# Patient Record
Sex: MALE | Race: WHITE | NOT HISPANIC OR LATINO | Employment: UNEMPLOYED | URBAN - METROPOLITAN AREA
[De-identification: names, ages, dates, MRNs, and addresses within clinical notes are randomized per-mention and may not be internally consistent; named-entity substitution may affect disease eponyms.]

---

## 2019-04-23 ENCOUNTER — OFFICE VISIT (OUTPATIENT)
Dept: URGENT CARE | Facility: CLINIC | Age: 49
End: 2019-04-23

## 2019-04-23 VITALS
SYSTOLIC BLOOD PRESSURE: 140 MMHG | RESPIRATION RATE: 16 BRPM | HEIGHT: 72 IN | WEIGHT: 180 LBS | HEART RATE: 90 BPM | DIASTOLIC BLOOD PRESSURE: 113 MMHG | OXYGEN SATURATION: 97 % | BODY MASS INDEX: 24.38 KG/M2 | TEMPERATURE: 99 F

## 2019-04-23 DIAGNOSIS — H10.31 ACUTE BACTERIAL CONJUNCTIVITIS OF RIGHT EYE: ICD-10-CM

## 2019-04-23 DIAGNOSIS — T15.91XA FOREIGN BODY OF RIGHT EYE, INITIAL ENCOUNTER: Primary | ICD-10-CM

## 2019-04-23 DIAGNOSIS — F17.200 CURRENT EVERY DAY SMOKER: ICD-10-CM

## 2019-04-23 PROCEDURE — 99203 PR OFFICE/OUTPT VISIT, NEW, LEVL III, 30-44 MIN: ICD-10-PCS | Mod: S$GLB,,, | Performed by: NURSE PRACTITIONER

## 2019-04-23 PROCEDURE — 99203 OFFICE O/P NEW LOW 30 MIN: CPT | Mod: S$GLB,,, | Performed by: NURSE PRACTITIONER

## 2019-04-23 RX ORDER — GENTAMICIN SULFATE 1 MG/G
OINTMENT TOPICAL
Qty: 15 G | Refills: 0 | Status: SHIPPED | OUTPATIENT
Start: 2019-04-23 | End: 2020-07-31

## 2019-04-23 NOTE — PATIENT INSTRUCTIONS
Return to Urgent Care or go to ER if symptoms worsen or fail to improve.  Follow up with PCP as recommended for further management.       Conjunctivitis, Bacterial    You have an infection in the membranes covering the white part of the eye. This part of the eye is called the conjunctiva. The infection is called conjunctivitis. The most common symptoms of conjunctivitis include a thick, pus-like discharge from the eye, swollen eyelids, redness, eyelids sticking together upon awakening, and a gritty or scratchy feeling in the eye. Your infection was caused by bacteria. It may be treated with medicine. With treatment, the infection takes about 7 to 10 days to resolve.  Home care  · Use prescribed antibiotic eye drops or ointment as directed to treat the infection.  · Apply a warm compress (towel soaked in warm water) to the affected eye 3 to 4 times a day. Do this just before applying medicine to the eye.  · Use a warm, wet cloth to wipe away crusting of the eyelids in the morning. This is caused by mucus drainage during the night. You may also use saline irrigating solution or artificial tears to rinse away mucus in the eye. Do not put a patch over the eye.  · Wash your hands before and after touching the infected eye. This is to prevent spreading the infection to the other eye, and to other people. Do not share your towels or washcloths with others.  · You may use acetaminophen or ibuprofen to control pain, unless another medicine was prescribed. (Note: If you have chronic liver or kidney disease or have ever had a stomach ulcer or gastrointestinal bleeding, talk with your doctor before using these medicines.)  · Do not wear contact lenses until your eyes have healed and all symptoms are gone.  Follow-up care  Follow up with your healthcare provider, or as advised.  When to seek medical advice  Call your healthcare provider right away if any of these occur:  · Worsening vision  · Increasing pain in the  eye  · Increasing swelling or redness of the eyelid  · Redness spreading around the eye  Date Last Reviewed: 6/14/2015  © 4355-1402 [employer]. [address] [place] [address], [place] [address]. All rights reserved. This information is not intended as a substitute for professional medical care. Always follow your healthcare professional's instructions.

## 2019-04-23 NOTE — PROGRESS NOTES
Subjective:       Patient ID: Tee Nicole is a 48 y.o. male.    Vitals:  height is 6' (1.829 m) and weight is 81.6 kg (180 lb). His tympanic temperature is 98.8 °F (37.1 °C). His blood pressure is 140/113 (abnormal) and his pulse is 90. His respiration is 16 and oxygen saturation is 97%.     Chief Complaint: Eye Problem    Patient states that he was cutting fence boards and felt something go in his right eye 3 days ago.  He flushed his eye at the time, but now he feels it is infected-- with drainage and eyelid swelling and discharge.   He also has blurry vision.     Eye Problem    The right eye is affected. This is a new problem. Episode onset: 2 days. The problem occurs constantly. The problem has been gradually worsening. The injury mechanism was a direct trauma. The pain is at a severity of 1/10. The pain is mild. There is no known exposure to pink eye. He does not wear contacts. Associated symptoms include blurred vision, an eye discharge, eye redness and photophobia. Pertinent negatives include no double vision, fever, itching, nausea or vomiting. He has tried water for the symptoms. The treatment provided no relief.       Constitution: Negative for chills and fever.   HENT: Negative for congestion and sinus pain.    Eyes: Positive for eye trauma, eye discharge, eye pain, eye redness, photophobia, blurred vision and eyelid swelling. Negative for foreign body in eye, eye itching, vision loss and double vision.   Gastrointestinal: Negative for nausea and vomiting.   Skin: Negative for rash.   Allergic/Immunologic: Negative for seasonal allergies and itching.   Neurological: Negative for headaches.       Objective:      Physical Exam   Constitutional: He is oriented to person, place, and time. Vital signs are normal. He appears well-developed and well-nourished.   HENT:   Head: Normocephalic and atraumatic.   Right Ear: External ear normal.   Left Ear: External ear normal.   Nose: Nose normal.    Mouth/Throat: Oropharynx is clear and moist.   Eyes: Pupils are equal, round, and reactive to light. EOM and lids are normal. Lids are everted and swept, no foreign bodies found. Right eye exhibits discharge and exudate. No foreign body present in the right eye. Left eye exhibits no discharge and no exudate. No foreign body present in the left eye. Right conjunctiva is injected. Left conjunctiva is not injected.   Slit lamp exam:       The left eye shows no corneal ulcer, no foreign body and no fluorescein uptake.   Neck: Trachea normal, full passive range of motion without pain and phonation normal. Neck supple.   Musculoskeletal: Normal range of motion.   Neurological: He is alert and oriented to person, place, and time.   Skin: Skin is warm, dry and intact.   Psychiatric: He has a normal mood and affect. His speech is normal and behavior is normal. Judgment and thought content normal. Cognition and memory are normal.   Nursing note and vitals reviewed.      Assessment:       1. Foreign body of right eye, initial encounter    2. Acute bacterial conjunctivitis of right eye    3. Current every day smoker        Plan:       Right eye flushed with copious amounts of saline. 1mm soft material noted in flushed fluid.     Foreign body of right eye, initial encounter    Acute bacterial conjunctivitis of right eye    Current every day smoker    Other orders  -     gentamicin (GARAMYCIN) 0.1 % ointment; Apply 1/2 inch ribbon in the right eye TID x 10 days  Dispense: 15 g; Refill: 0

## 2019-04-26 ENCOUNTER — OFFICE VISIT (OUTPATIENT)
Dept: URGENT CARE | Facility: CLINIC | Age: 49
End: 2019-04-26

## 2019-04-26 VITALS
OXYGEN SATURATION: 97 % | RESPIRATION RATE: 18 BRPM | WEIGHT: 180 LBS | SYSTOLIC BLOOD PRESSURE: 145 MMHG | DIASTOLIC BLOOD PRESSURE: 101 MMHG | BODY MASS INDEX: 24.38 KG/M2 | HEART RATE: 94 BPM | TEMPERATURE: 98 F | HEIGHT: 72 IN

## 2019-04-26 DIAGNOSIS — H00.033 CELLULITIS OF RIGHT EYELID: ICD-10-CM

## 2019-04-26 DIAGNOSIS — H10.9 BACTERIAL CONJUNCTIVITIS: Primary | ICD-10-CM

## 2019-04-26 PROCEDURE — 99214 PR OFFICE/OUTPT VISIT, EST, LEVL IV, 30-39 MIN: ICD-10-PCS | Mod: S$GLB,,, | Performed by: PHYSICIAN ASSISTANT

## 2019-04-26 PROCEDURE — 99214 OFFICE O/P EST MOD 30 MIN: CPT | Mod: S$GLB,,, | Performed by: PHYSICIAN ASSISTANT

## 2019-04-26 RX ORDER — POLYMYXIN B SULFATE AND TRIMETHOPRIM 1; 10000 MG/ML; [USP'U]/ML
1 SOLUTION OPHTHALMIC EVERY 6 HOURS
Qty: 10 ML | Refills: 0 | Status: SHIPPED | OUTPATIENT
Start: 2019-04-26 | End: 2019-05-03

## 2019-04-26 RX ORDER — SULFAMETHOXAZOLE AND TRIMETHOPRIM 800; 160 MG/1; MG/1
1 TABLET ORAL 2 TIMES DAILY
Qty: 14 TABLET | Refills: 0 | Status: SHIPPED | OUTPATIENT
Start: 2019-04-26 | End: 2019-05-03

## 2019-04-26 NOTE — PATIENT INSTRUCTIONS
-Please take antibiotic to completion.    Apply antibiotic drops to completion. Apply warm compresses to eye 3-4 times daily for 10-15 minutes.    Please follow up with your primary care provider within 2-5 days if your signs and symptoms have not resolved or worsen.     If your condition worsens or fails to improve we recommend that you receive another evaluation at the emergency room immediately or contact your primary medical clinic to discuss your concerns.   You must understand that you have received an Urgent Care treatment only and that you may be released before all of your medical problems are known or treated. You, the patient, will arrange for follow up care as instructed.         Conjunctivitis, Bacterial    You have an infection in the membranes covering the white part of the eye. This part of the eye is called the conjunctiva. The infection is called conjunctivitis. The most common symptoms of conjunctivitis include a thick, pus-like discharge from the eye, swollen eyelids, redness, eyelids sticking together upon awakening, and a gritty or scratchy feeling in the eye. Your infection was caused by bacteria. It may be treated with medicine. With treatment, the infection takes about 7 to 10 days to resolve.  Home care  · Use prescribed antibiotic eye drops or ointment as directed to treat the infection.  · Apply a warm compress (towel soaked in warm water) to the affected eye 3 to 4 times a day. Do this just before applying medicine to the eye.  · Use a warm, wet cloth to wipe away crusting of the eyelids in the morning. This is caused by mucus drainage during the night. You may also use saline irrigating solution or artificial tears to rinse away mucus in the eye. Do not put a patch over the eye.  · Wash your hands before and after touching the infected eye. This is to prevent spreading the infection to the other eye, and to other people. Do not share your towels or washcloths with others.  · You may use  acetaminophen or ibuprofen to control pain, unless another medicine was prescribed. (Note: If you have chronic liver or kidney disease or have ever had a stomach ulcer or gastrointestinal bleeding, talk with your doctor before using these medicines.)  · Do not wear contact lenses until your eyes have healed and all symptoms are gone.  Follow-up care  Follow up with your healthcare provider, or as advised.  When to seek medical advice  Call your healthcare provider right away if any of these occur:  · Worsening vision  · Increasing pain in the eye  · Increasing swelling or redness of the eyelid  · Redness spreading around the eye  Date Last Reviewed: 6/14/2015  © 9730-2815 [employer]. [address] [place] [address], [place] [address]. All rights reserved. This information is not intended as a substitute for professional medical care. Always follow your healthcare professional's instructions.

## 2019-04-26 NOTE — PROGRESS NOTES
Subjective:       Patient ID: Tee Nicole is a 48 y.o. male.    Vitals:  height is 6' (1.829 m) and weight is 81.6 kg (180 lb). His temperature is 98.2 °F (36.8 °C). His blood pressure is 145/101 (abnormal) and his pulse is 94. His respiration is 18 and oxygen saturation is 97%.     Chief Complaint: Eye Problem    Eye Problem    The right eye is affected. This is a new problem. The current episode started 1 to 4 weeks ago (1 week). The problem occurs constantly. The problem has been gradually worsening. There was no injury mechanism. The pain is at a severity of 4/10. There is no known exposure to pink eye. He does not wear contacts. Associated symptoms include eye redness. Pertinent negatives include no blurred vision, eye discharge, double vision, fever, itching, nausea, photophobia or vomiting. He has tried nothing for the symptoms.       Constitution: Negative for chills and fever.   HENT: Negative for congestion and sinus pain.    Eyes: Positive for eye pain, eye redness and eyelid swelling. Negative for eye trauma, foreign body in eye, eye discharge, eye itching, photophobia, vision loss, double vision and blurred vision.   Gastrointestinal: Negative for nausea and vomiting.   Skin: Positive for erythema. Negative for rash.   Allergic/Immunologic: Negative for seasonal allergies and itching.   Neurological: Negative for headaches.       Objective:      Physical Exam   Constitutional: He is oriented to person, place, and time. He appears well-developed and well-nourished. He does not appear ill. No distress.   HENT:   Head: Normocephalic and atraumatic.   Right Ear: External ear normal.   Left Ear: External ear normal.   Nose: Nose normal.   Eyes: Pupils are equal, round, and reactive to light. EOM are normal. Lids are everted and swept, no foreign bodies found. Right eye exhibits discharge (purulent). Right eye exhibits no chemosis, no exudate and no hordeolum. No foreign body present in the right eye.  Left eye exhibits no chemosis, no discharge, no exudate and no hordeolum. No foreign body present in the left eye. Right conjunctiva is injected. Right conjunctiva has no hemorrhage. Left conjunctiva is not injected. Left conjunctiva has no hemorrhage. No scleral icterus.       Neck: Normal range of motion. Neck supple.   Cardiovascular: Normal rate, regular rhythm and normal heart sounds. Exam reveals no gallop and no friction rub.   No murmur heard.  Pulmonary/Chest: Effort normal and breath sounds normal. No stridor. No respiratory distress. He has no decreased breath sounds. He has no wheezes. He has no rhonchi. He has no rales.   Musculoskeletal: Normal range of motion.   Neurological: He is alert and oriented to person, place, and time.   Skin: Skin is warm and dry. No rash noted. He is not diaphoretic. There is erythema. No pallor.   Psychiatric: He has a normal mood and affect. His behavior is normal.   Nursing note and vitals reviewed.      Assessment:       1. Bacterial conjunctivitis    2. Cellulitis of right eyelid        Plan:         Bacterial conjunctivitis  -     polymyxin B sulf-trimethoprim (POLYTRIM) 10,000 unit- 1 mg/mL Drop; Place 1 drop into both eyes every 6 (six) hours. for 7 days  Dispense: 10 mL; Refill: 0    Cellulitis of right eyelid  -     sulfamethoxazole-trimethoprim 800-160mg (BACTRIM DS) 800-160 mg Tab; Take 1 tablet by mouth 2 (two) times daily. for 7 days  Dispense: 14 tablet; Refill: 0      Patient Instructions   -Please take antibiotic to completion.    Apply antibiotic drops to completion. Apply warm compresses to eye 3-4 times daily for 10-15 minutes.    Please follow up with your primary care provider within 2-5 days if your signs and symptoms have not resolved or worsen.     If your condition worsens or fails to improve we recommend that you receive another evaluation at the emergency room immediately or contact your primary medical clinic to discuss your concerns.   You must  understand that you have received an Urgent Care treatment only and that you may be released before all of your medical problems are known or treated. You, the patient, will arrange for follow up care as instructed.         Conjunctivitis, Bacterial    You have an infection in the membranes covering the white part of the eye. This part of the eye is called the conjunctiva. The infection is called conjunctivitis. The most common symptoms of conjunctivitis include a thick, pus-like discharge from the eye, swollen eyelids, redness, eyelids sticking together upon awakening, and a gritty or scratchy feeling in the eye. Your infection was caused by bacteria. It may be treated with medicine. With treatment, the infection takes about 7 to 10 days to resolve.  Home care  · Use prescribed antibiotic eye drops or ointment as directed to treat the infection.  · Apply a warm compress (towel soaked in warm water) to the affected eye 3 to 4 times a day. Do this just before applying medicine to the eye.  · Use a warm, wet cloth to wipe away crusting of the eyelids in the morning. This is caused by mucus drainage during the night. You may also use saline irrigating solution or artificial tears to rinse away mucus in the eye. Do not put a patch over the eye.  · Wash your hands before and after touching the infected eye. This is to prevent spreading the infection to the other eye, and to other people. Do not share your towels or washcloths with others.  · You may use acetaminophen or ibuprofen to control pain, unless another medicine was prescribed. (Note: If you have chronic liver or kidney disease or have ever had a stomach ulcer or gastrointestinal bleeding, talk with your doctor before using these medicines.)  · Do not wear contact lenses until your eyes have healed and all symptoms are gone.  Follow-up care  Follow up with your healthcare provider, or as advised.  When to seek medical advice  Call your healthcare provider right  away if any of these occur:  · Worsening vision  · Increasing pain in the eye  · Increasing swelling or redness of the eyelid  · Redness spreading around the eye  Date Last Reviewed: 6/14/2015  © 4742-7024 [employer]. [address] [place] [address], [place] [address]. All rights reserved. This information is not intended as a substitute for professional medical care. Always follow your healthcare professional's instructions.

## 2020-05-26 ENCOUNTER — OFFICE VISIT (OUTPATIENT)
Dept: URGENT CARE | Facility: CLINIC | Age: 50
End: 2020-05-26
Payer: MEDICAID

## 2020-05-26 VITALS
HEIGHT: 72 IN | RESPIRATION RATE: 18 BRPM | WEIGHT: 190 LBS | OXYGEN SATURATION: 96 % | TEMPERATURE: 98 F | HEART RATE: 119 BPM | BODY MASS INDEX: 25.73 KG/M2

## 2020-05-26 DIAGNOSIS — M25.521 RIGHT ELBOW PAIN: Primary | ICD-10-CM

## 2020-05-26 DIAGNOSIS — Z11.59 ENCOUNTER FOR SCREENING FOR OTHER VIRAL DISEASES: ICD-10-CM

## 2020-05-26 DIAGNOSIS — M77.9 TENDONITIS: ICD-10-CM

## 2020-05-26 LAB — SARS-COV-2 RNA RESP QL NAA+PROBE: NOT DETECTED

## 2020-05-26 PROCEDURE — 73080 XR ELBOW COMPLETE 3 VIEW RIGHT: ICD-10-PCS | Mod: FY,RT,S$GLB, | Performed by: RADIOLOGY

## 2020-05-26 PROCEDURE — 99214 PR OFFICE/OUTPT VISIT, EST, LEVL IV, 30-39 MIN: ICD-10-PCS | Mod: S$GLB,,, | Performed by: NURSE PRACTITIONER

## 2020-05-26 PROCEDURE — 73080 X-RAY EXAM OF ELBOW: CPT | Mod: FY,RT,S$GLB, | Performed by: RADIOLOGY

## 2020-05-26 PROCEDURE — 99214 OFFICE O/P EST MOD 30 MIN: CPT | Mod: S$GLB,,, | Performed by: NURSE PRACTITIONER

## 2020-05-26 PROCEDURE — U0003 INFECTIOUS AGENT DETECTION BY NUCLEIC ACID (DNA OR RNA); SEVERE ACUTE RESPIRATORY SYNDROME CORONAVIRUS 2 (SARS-COV-2) (CORONAVIRUS DISEASE [COVID-19]), AMPLIFIED PROBE TECHNIQUE, MAKING USE OF HIGH THROUGHPUT TECHNOLOGIES AS DESCRIBED BY CMS-2020-01-R: HCPCS

## 2020-05-26 RX ORDER — NAPROXEN 500 MG/1
500 TABLET ORAL 2 TIMES DAILY WITH MEALS
Qty: 20 TABLET | Refills: 0 | Status: SHIPPED | OUTPATIENT
Start: 2020-05-26 | End: 2020-06-05

## 2020-05-26 NOTE — LETTER
May 26, 2020      [facility]  [place] [id number]  Phone: [phone]  Fax: [phone]       Patient: Tee Nicole   YOB: 1970  Date of Visit: 05/26/2020    To Whom It May Concern:    Ana Nicole  was at [facility] on 05/26/2020. He may return to work/school on 5/27/2020 with no restrictions. If you have any questions or concerns, or if I can be of further assistance, please do not hesitate to contact me.    Sincerely,      Cuca Rivera, NP

## 2020-05-26 NOTE — PROGRESS NOTES
Subjective:       Patient ID: Tee Nicole is a 49 y.o. male.    Vitals:  height is 6' (1.829 m) and weight is 86.2 kg (190 lb). His oral temperature is 97.6 °F (36.4 °C). His pulse is 119 (abnormal). His respiration is 18 and oxygen saturation is 96%.     Chief Complaint: Elbow Pain    Patient states bumped it or hit it.  Patient called his work and stated his work is requiring COVID testing.  Needs work excuse after COVID test returns.     Elbow Pain   This is a new problem. The current episode started today. The problem occurs constantly. The problem has been gradually worsening. Pertinent negatives include no arthralgias, chest pain, chills, congestion, coughing, fatigue, fever, headaches, joint swelling, myalgias, nausea, rash, sore throat, vertigo or vomiting. Associated symptoms comments: Passed kidney stone this morning after that now has pain in right elbow. The symptoms are aggravated by twisting and walking. He has tried position changes for the symptoms. The treatment provided no relief.       Constitution: Negative for chills, fatigue and fever.   HENT: Negative for congestion and sore throat.    Neck: Negative for painful lymph nodes.   Cardiovascular: Negative for chest pain and leg swelling.   Eyes: Negative for double vision and blurred vision.   Respiratory: Negative for cough and shortness of breath.    Gastrointestinal: Negative for nausea, vomiting and diarrhea.   Genitourinary: Negative for dysuria, frequency and urgency.   Musculoskeletal: Positive for pain. Negative for joint pain, joint swelling, muscle cramps and muscle ache.   Skin: Negative for color change, pale and rash.   Allergic/Immunologic: Negative for seasonal allergies.   Neurological: Negative for dizziness, history of vertigo, light-headedness, passing out and headaches.   Hematologic/Lymphatic: Negative for swollen lymph nodes, easy bruising/bleeding and history of blood clots. Does not bruise/bleed easily.    Psychiatric/Behavioral: Negative for nervous/anxious, sleep disturbance and depression. The patient is not nervous/anxious.        Objective:      Physical Exam   Constitutional: He is oriented to person, place, and time. Vital signs are normal. He appears well-developed and well-nourished. He is active and cooperative. No distress.   HENT:   Head: Normocephalic and atraumatic.   Nose: Nose normal.   Eyes: Conjunctivae and lids are normal.   Cardiovascular: Normal rate, regular rhythm, normal heart sounds, intact distal pulses and normal pulses.   Pulmonary/Chest: Effort normal and breath sounds normal.   Musculoskeletal: He exhibits no edema or deformity.        Right elbow: He exhibits decreased range of motion. He exhibits no swelling, no effusion, no deformity and no laceration. Tenderness (TTP, see diagram) found.        Arms:  Neurological: He is alert and oriented to person, place, and time. He has normal strength and normal reflexes. He is not disoriented. No sensory deficit.   Skin: Skin is warm, dry, intact and not diaphoretic.   Psychiatric: He has a normal mood and affect. His speech is normal and behavior is normal. Judgment and thought content normal. Cognition and memory are normal.   Nursing note and vitals reviewed.        Assessment:       1. Right elbow pain    2. Encounter for screening for other viral diseases    3. Tendonitis        Plan:       X-ray Elbow Complete 3 Views Right    Result Date: 5/26/2020  EXAMINATION: XR ELBOW COMPLETE 3 VIEW RIGHT CLINICAL HISTORY: . Pain in right elbow TECHNIQUE: AP, lateral, and radial head views of the right elbow were performed. COMPARISON: Three views: FINDINGS: No fracture or dislocation. No fat pad elevation. Cartilage spaces are maintained. Soft tissues are unremarkable.     No fracture dislocation bone destruction or joint effusion seen. Electronically signed by resident: Uziel Nguyen Date:    05/26/2020 Time:    10:08 Electronically signed by: Pradeep  MD Giovanni Date:    05/26/2020 Time:    10:11    Right elbow pain  -     X-Ray Elbow Complete 3 views Right; Future; Expected date: 05/26/2020  -     naproxen (NAPROSYN) 500 MG tablet; Take 1 tablet (500 mg total) by mouth 2 (two) times daily with meals. As needed for pain. for 10 days  Dispense: 20 tablet; Refill: 0    Encounter for screening for other viral diseases  -     COVID-19 Routine Screening    Tendonitis  -     X-Ray Elbow Complete 3 views Right; Future; Expected date: 05/26/2020  -     naproxen (NAPROSYN) 500 MG tablet; Take 1 tablet (500 mg total) by mouth 2 (two) times daily with meals. As needed for pain. for 10 days  Dispense: 20 tablet; Refill: 0      Patient Instructions     Please follow up with your Primary care provider within 2-5 days if your signs and symptoms have not resolved or worsen.     If your condition worsens or fails to improve we recommend that you receive another evaluation at the emergency room immediately or contact your primary medical clinic to discuss your concerns.    You must understand that you have received an Urgent Care treatment only and that you may be released before all of your medical problems are known or treated.   You, the patient, will arrange for follow up care as instructed.       ORTHO    R.I.C.E. to the affected joint or limb as needed:    Rest- the injured or sore extremity.  Ice- for the next 24-48 hours, alternating 20 minutes on and 20 minutes off as needed up to 3 times a day.   Compression-Use bandages to stabilize injured limb.  Check circulation to make sure  bandage is not too tight.    Elevate-when possible to reduce swelling.      Ice 20 minutes on and 20 minutes off as needed for pain.     Please drink plenty of fluids.    Please get plenty of rest.    Please return here or go to the Emergency Department for any concerns or worsening of condition.    Please be aware that narcotics can be addictive. If I gave you narcotics, I have given you a  limited quantity to take as it is needed at this time. However take it sparingly and only when needed.  Do not operate machinery or drive on this medication.      If you were not prescribed an anti-inflammatory medication, and if you do not have any history of stomach/intestinal ulcers, or kidney disease, or are not taking a blood thinner such as Coumadin, Plavix, Pradaxa, Eloquis, or Xaralta for example, it is OK to take over the counter Ibuprofen or Advil or Motrin or Aleve as directed.  Do not take these medications on an empty stomach.    If you were given a splint wear it at all times unless otherwise instructed.     If you were given crutches use them as we instructed. Do not rest your armpits on the foam pad or you risk compressing the nerves and the vessels there.    Please follow up with your primary care doctor or specialist as needed.    If you lose control of your bowel and/or bladder, please go to the nearest Emergency Department immediately.  If you lose sensation in between your legs by your genitalia and/or rectum, please go to the nearest Emergency Department immediately.  If you lose control or sensation of any extremity, please go to the nearest Emergency Department immediately.      R.I.C.E.    R.I.C.E. stands for Rest, Ice, Compression, and Elevation. Doing these things helps limit pain and swelling after an injury. R.I.C.E. also helps injuries heal faster. Use R.I.C.E. for sprains, strains, and severe bruises or bumps. Follow the tips on this handout and begin R.I.C.E. as soon as possible after an injury.  ? Rest  Pain is your bodys way of telling you to rest an injured area. Whether you have hurt an elbow, hand, foot, or knee, limiting its use will prevent further injury and help you heal.  ? Ice  Applying ice right after an injury helps prevent swelling and reduce pain. Dont place ice directly on your skin.  · Wrap a cold pack or bag of ice in a thin cloth. Place it over the injured  area.  · Ice for 10 minutes every 3 hours. Dont ice for more than 20 minutes at a time.  ? Compression  Putting pressure (compression) on an injury helps prevent swelling and provides support.  · Wrap the injured area firmly with an elastic bandage. If your hand or foot tingles, becomes discolored, or feels cold to the touch, the bandage may be too tight. Rewrap it more loosely.  · If your bandage becomes too loose, rewrap it.  · Do not wear an elastic bandage overnight.  ? Elevation  Keeping an injury elevated helps reduce swelling, pain, and throbbing. Elevation is most effective when the injury is kept elevated higher than the heart.     Call your healthcare provider if you notice any of the following:  · Fingers or toes feel numb, are cold to the touch, or change color  · Skin looks shiny or tight  · Pain, swelling, or bruising worsens and is not improved with elevation   Date Last Reviewed: 9/3/2015  © 8865-4152 [employer]. [address] [place] [address], [place] [address]. All rights reserved. This information is not intended as a substitute for professional medical care. Always follow your healthcare professional's instructions.

## 2020-05-26 NOTE — PATIENT INSTRUCTIONS
Please follow up with your Primary care provider within 2-5 days if your signs and symptoms have not resolved or worsen.     If your condition worsens or fails to improve we recommend that you receive another evaluation at the emergency room immediately or contact your primary medical clinic to discuss your concerns.    You must understand that you have received an Urgent Care treatment only and that you may be released before all of your medical problems are known or treated.   You, the patient, will arrange for follow up care as instructed.       ORTHO    R.I.C.E. to the affected joint or limb as needed:    Rest- the injured or sore extremity.  Ice- for the next 24-48 hours, alternating 20 minutes on and 20 minutes off as needed up to 3 times a day.   Compression-Use bandages to stabilize injured limb.  Check circulation to make sure  bandage is not too tight.    Elevate-when possible to reduce swelling.      Ice 20 minutes on and 20 minutes off as needed for pain.     Please drink plenty of fluids.    Please get plenty of rest.    Please return here or go to the Emergency Department for any concerns or worsening of condition.    Please be aware that narcotics can be addictive. If I gave you narcotics, I have given you a limited quantity to take as it is needed at this time. However take it sparingly and only when needed.  Do not operate machinery or drive on this medication.      If you were not prescribed an anti-inflammatory medication, and if you do not have any history of stomach/intestinal ulcers, or kidney disease, or are not taking a blood thinner such as Coumadin, Plavix, Pradaxa, Eloquis, or Xaralta for example, it is OK to take over the counter Ibuprofen or Advil or Motrin or Aleve as directed.  Do not take these medications on an empty stomach.    If you were given a splint wear it at all times unless otherwise instructed.     If you were given crutches use them as we instructed. Do not rest your  armpits on the foam pad or you risk compressing the nerves and the vessels there.    Please follow up with your primary care doctor or specialist as needed.    If you lose control of your bowel and/or bladder, please go to the nearest Emergency Department immediately.  If you lose sensation in between your legs by your genitalia and/or rectum, please go to the nearest Emergency Department immediately.  If you lose control or sensation of any extremity, please go to the nearest Emergency Department immediately.      R.I.C.E.    R.I.C.E. stands for Rest, Ice, Compression, and Elevation. Doing these things helps limit pain and swelling after an injury. R.I.C.E. also helps injuries heal faster. Use R.I.C.E. for sprains, strains, and severe bruises or bumps. Follow the tips on this handout and begin R.I.C.E. as soon as possible after an injury.  ? Rest  Pain is your bodys way of telling you to rest an injured area. Whether you have hurt an elbow, hand, foot, or knee, limiting its use will prevent further injury and help you heal.  ? Ice  Applying ice right after an injury helps prevent swelling and reduce pain. Dont place ice directly on your skin.  · Wrap a cold pack or bag of ice in a thin cloth. Place it over the injured area.  · Ice for 10 minutes every 3 hours. Dont ice for more than 20 minutes at a time.  ? Compression  Putting pressure (compression) on an injury helps prevent swelling and provides support.  · Wrap the injured area firmly with an elastic bandage. If your hand or foot tingles, becomes discolored, or feels cold to the touch, the bandage may be too tight. Rewrap it more loosely.  · If your bandage becomes too loose, rewrap it.  · Do not wear an elastic bandage overnight.  ? Elevation  Keeping an injury elevated helps reduce swelling, pain, and throbbing. Elevation is most effective when the injury is kept elevated higher than the heart.     Call your healthcare provider if you notice any of the  following:  · Fingers or toes feel numb, are cold to the touch, or change color  · Skin looks shiny or tight  · Pain, swelling, or bruising worsens and is not improved with elevation   Date Last Reviewed: 9/3/2015  © 2677-4625 [employer]. [address] [place] [address]. All rights reserved. This information is not intended as a substitute for professional medical care. Always follow your healthcare professional's instructions.

## 2020-05-28 ENCOUNTER — TELEPHONE (OUTPATIENT)
Dept: URGENT CARE | Facility: CLINIC | Age: 50
End: 2020-05-28

## 2020-05-28 NOTE — TELEPHONE ENCOUNTER
Call back covid results - Spoke with patient and gave him neg results.     ----- Message from Sandrine Abraham PA-C sent at 5/28/2020  9:34 AM CDT -----  Please let patient know of NEGATIVE COVID-19 testing. Thanks.

## 2020-05-29 ENCOUNTER — TELEPHONE (OUTPATIENT)
Dept: URGENT CARE | Facility: CLINIC | Age: 50
End: 2020-05-29

## 2020-06-04 ENCOUNTER — OFFICE VISIT (OUTPATIENT)
Dept: URGENT CARE | Facility: CLINIC | Age: 50
End: 2020-06-04
Payer: MEDICAID

## 2020-06-04 VITALS
WEIGHT: 190 LBS | BODY MASS INDEX: 25.73 KG/M2 | OXYGEN SATURATION: 96 % | HEART RATE: 110 BPM | HEIGHT: 72 IN | TEMPERATURE: 97 F

## 2020-06-04 DIAGNOSIS — Z11.59 ENCOUNTER FOR SCREENING FOR OTHER VIRAL DISEASES: ICD-10-CM

## 2020-06-04 DIAGNOSIS — L91.8 SKIN TAG: Primary | ICD-10-CM

## 2020-06-04 PROCEDURE — 99213 PR OFFICE/OUTPT VISIT, EST, LEVL III, 20-29 MIN: ICD-10-PCS | Mod: S$GLB,,, | Performed by: PHYSICIAN ASSISTANT

## 2020-06-04 PROCEDURE — U0003 INFECTIOUS AGENT DETECTION BY NUCLEIC ACID (DNA OR RNA); SEVERE ACUTE RESPIRATORY SYNDROME CORONAVIRUS 2 (SARS-COV-2) (CORONAVIRUS DISEASE [COVID-19]), AMPLIFIED PROBE TECHNIQUE, MAKING USE OF HIGH THROUGHPUT TECHNOLOGIES AS DESCRIBED BY CMS-2020-01-R: HCPCS

## 2020-06-04 PROCEDURE — 99213 OFFICE O/P EST LOW 20 MIN: CPT | Mod: S$GLB,,, | Performed by: PHYSICIAN ASSISTANT

## 2020-06-04 RX ORDER — ESCITALOPRAM OXALATE 10 MG/1
10 TABLET ORAL DAILY
COMMUNITY
Start: 2020-03-30 | End: 2020-07-31

## 2020-06-04 NOTE — PATIENT INSTRUCTIONS
PLEASE READ YOUR DISCHARGE INSTRUCTIONS ENTIRELY AS IT CONTAINS IMPORTANT INFORMATION.  A dermatology referral was printed for you.  Please call your insurance card and find out what local dermatologist takes her insurance.  We will call with the results of your COVID-19 testing in 24-72 hours.  - Rest.    - Drink plenty of fluids.    - Tylenol or Ibuprofen as directed as needed for fever/pain.    - If you were prescribed antibiotics, please take them to completion.  - If you are female and on birth control pills - please use additional methods of contraception to prevent pregnancy while on antibiotics and for one cycle after.   - If you were prescribed a narcotic medication or muscle relaxer, do not drive or operate heavy equipment or machinery while taking these medications, as they can cause drowsiness.   - If you smoke, please stop smoking.  -You must understand that you've received an Urgent Care treatment only and that you may be released before all your medical problems are known or treated. You, the patient, will    arrange for follow up care as instructed. Please arrange follow up with your primary medical clinic as soon as possible.   - Follow up with your PCP or specialty clinic as directed in the next 1-2 weeks if not improved or as needed.  You can call [phone] to schedule an appointment with the appropriate provider.    - Please return to Urgent Care or to the Emergency Department if your symptoms worsen.    Patient aware and verbalized understanding.

## 2020-06-04 NOTE — PROGRESS NOTES
Subjective:       Patient ID: Tee Nicole is a 49 y.o. male.    Vitals:  height is 6' (1.829 m) and weight is 86.2 kg (190 lb). His temperature is 97.3 °F (36.3 °C). His pulse is 110. His oxygen saturation is 96%.     Chief Complaint: Skin Lesion    Mr. Nicole presents for evaluation of skin multiple skin lesions.  He has had these since approximately February.  He denies any change in size or color.  They are not painful, no redness, no drainage.  He has no history of skin cancer.  He has noticed 1 lesion on his abdomen and 2 on his back.  They do bother him when his shirt touches them.  He has not taken anything for symptoms.  He would also like COVID-19 testing.  He has not had any exposure and is asymptomatic.    Rash   This is a new problem. The current episode started more than 1 month ago. The problem is unchanged. The affected locations include the abdomen. The rash is characterized by redness. It is unknown if there was an exposure to a precipitant. Pertinent negatives include no anorexia, congestion, cough, diarrhea, eye pain, facial edema, fatigue, fever, joint pain, nail changes, rhinorrhea, shortness of breath, sore throat or vomiting. Past treatments include nothing. The treatment provided no relief. There is no history of allergies, asthma, eczema or varicella.       Constitution: Negative for chills, fatigue and fever.   HENT: Negative for facial swelling, congestion and sore throat.    Neck: Negative for painful lymph nodes.   Eyes: Negative for eye itching, eye pain and eyelid swelling.   Respiratory: Negative for cough and shortness of breath.    Gastrointestinal: Negative for vomiting and diarrhea.   Musculoskeletal: Negative for joint pain and joint swelling.   Skin: Positive for lesion. Negative for color change, pale, rash, wound, abrasion, laceration, skin thickening/induration, puncture wound, erythema, bruising, abscess, avulsion and hives.   Allergic/Immunologic: Negative for  environmental allergies, immunocompromised state and hives.   Hematologic/Lymphatic: Negative for swollen lymph nodes.       Objective:      Physical Exam   Constitutional: He is oriented to person, place, and time. He appears well-developed and well-nourished.   HENT:   Head: Normocephalic and atraumatic. Head is without abrasion, without contusion and without laceration.   Right Ear: External ear normal.   Left Ear: External ear normal.   Nose: Nose normal.   Mouth/Throat: Oropharynx is clear and moist and mucous membranes are normal.   Eyes: Pupils are equal, round, and reactive to light. Conjunctivae, EOM and lids are normal.   Neck: Trachea normal, full passive range of motion without pain and phonation normal. Neck supple.   Cardiovascular: Normal rate, regular rhythm and normal heart sounds.   Pulmonary/Chest: Effort normal and breath sounds normal. No stridor. No respiratory distress. He has no decreased breath sounds. He has no wheezes. He has no rhonchi. He has no rales.   Musculoskeletal: Normal range of motion.   Neurological: He is alert and oriented to person, place, and time.   Skin: Skin is warm, dry, intact and no rash. Capillary refill takes less than 2 seconds. abrasion, burn, bruising, erythema and ecchymosis       Psychiatric: He has a normal mood and affect. His speech is normal and behavior is normal. Judgment and thought content normal. Cognition and memory are normal.   Nursing note and vitals reviewed.        Assessment:       1. Skin tag    2. Encounter for screening for other viral diseases        Plan:         Skin tag  -     Ambulatory referral/consult to Dermatology    Encounter for screening for other viral diseases    Other orders  -     COVID-19 Routine Screening    [facility] called [facility], [facility] appt available until 2021.  Written referral to derm given to patient with instructions.     Patient Instructions   PLEASE READ YOUR DISCHARGE INSTRUCTIONS ENTIRELY AS IT CONTAINS  IMPORTANT INFORMATION.  A dermatology referral was printed for you.  Please call your insurance card and find out what local dermatologist takes her insurance.  We will call with the results of your COVID-19 testing in 24-72 hours.  - Rest.    - Drink plenty of fluids.    - Tylenol or Ibuprofen as directed as needed for fever/pain.    - If you were prescribed antibiotics, please take them to completion.  - If you are female and on birth control pills - please use additional methods of contraception to prevent pregnancy while on antibiotics and for one cycle after.   - If you were prescribed a narcotic medication or muscle relaxer, do not drive or operate heavy equipment or machinery while taking these medications, as they can cause drowsiness.   - If you smoke, please stop smoking.  -You must understand that you've received an Urgent Care treatment only and that you may be released before all your medical problems are known or treated. You, the patient, will    arrange for follow up care as instructed. Please arrange follow up with your primary medical clinic as soon as possible.   - Follow up with your PCP or specialty clinic as directed in the next 1-2 weeks if not improved or as needed.  You can call [phone] to schedule an appointment with the appropriate provider.    - Please return to Urgent Care or to the Emergency Department if your symptoms worsen.    Patient aware and verbalized understanding.

## 2020-06-05 LAB — SARS-COV-2 RNA RESP QL NAA+PROBE: NOT DETECTED

## 2020-06-07 ENCOUNTER — TELEPHONE (OUTPATIENT)
Dept: URGENT CARE | Facility: CLINIC | Age: 50
End: 2020-06-07

## 2020-06-07 NOTE — TELEPHONE ENCOUNTER
Informed patient of negative lab results. Patient verbalized understanding.         ----- Message from Deng Ayala PA-C sent at 6/5/2020  9:11 AM CDT -----  Please call the patient regarding his normal result. Thanks

## 2020-06-07 NOTE — TELEPHONE ENCOUNTER
----- Message from Deng Ayala PA-C sent at 6/5/2020  9:11 AM CDT -----  Please call the patient regarding his normal result. Thanks

## 2020-07-31 ENCOUNTER — HOSPITAL ENCOUNTER (EMERGENCY)
Facility: HOSPITAL | Age: 50
Discharge: HOME | End: 2020-07-31
Attending: EMERGENCY MEDICINE
Payer: MEDICAID

## 2020-07-31 VITALS
HEART RATE: 83 BPM | DIASTOLIC BLOOD PRESSURE: 86 MMHG | HEIGHT: 72 IN | WEIGHT: 185 LBS | TEMPERATURE: 99 F | RESPIRATION RATE: 18 BRPM | BODY MASS INDEX: 25.06 KG/M2 | SYSTOLIC BLOOD PRESSURE: 150 MMHG | OXYGEN SATURATION: 100 %

## 2020-07-31 DIAGNOSIS — N13.2 URETERAL STONE WITH HYDRONEPHROSIS: ICD-10-CM

## 2020-07-31 DIAGNOSIS — N23 RENAL COLIC ON RIGHT SIDE: Primary | ICD-10-CM

## 2020-07-31 LAB
ALBUMIN SERPL BCP-MCNC: 3.7 G/DL (ref 3.5–5.2)
ALP SERPL-CCNC: 99 U/L (ref 55–135)
ALT SERPL W/O P-5'-P-CCNC: 16 U/L (ref 10–44)
ANION GAP SERPL CALC-SCNC: 9 MMOL/L (ref 8–16)
AST SERPL-CCNC: 17 U/L (ref 10–40)
BASOPHILS # BLD AUTO: 0.06 K/UL (ref 0–0.2)
BASOPHILS NFR BLD: 0.5 % (ref 0–1.9)
BILIRUB SERPL-MCNC: 0.4 MG/DL (ref 0.1–1)
BILIRUB UR QL STRIP: NEGATIVE
BUN SERPL-MCNC: 12 MG/DL (ref 6–20)
CALCIUM SERPL-MCNC: 8.6 MG/DL (ref 8.7–10.5)
CHLORIDE SERPL-SCNC: 106 MMOL/L (ref 95–110)
CLARITY UR: CLEAR
CO2 SERPL-SCNC: 22 MMOL/L (ref 23–29)
COLOR UR: YELLOW
CREAT SERPL-MCNC: 1.2 MG/DL (ref 0.5–1.4)
DIFFERENTIAL METHOD: NORMAL
EOSINOPHIL # BLD AUTO: 0.3 K/UL (ref 0–0.5)
EOSINOPHIL NFR BLD: 2.4 % (ref 0–8)
ERYTHROCYTE [DISTWIDTH] IN BLOOD BY AUTOMATED COUNT: 13.8 % (ref 11.5–14.5)
EST. GFR  (AFRICAN AMERICAN): >60 ML/MIN/1.73 M^2
EST. GFR  (NON AFRICAN AMERICAN): >60 ML/MIN/1.73 M^2
GLUCOSE SERPL-MCNC: 122 MG/DL (ref 70–110)
GLUCOSE UR QL STRIP: NEGATIVE
HCT VFR BLD AUTO: 46.8 % (ref 40–54)
HGB BLD-MCNC: 15.8 G/DL (ref 14–18)
HGB UR QL STRIP: ABNORMAL
IMM GRANULOCYTES # BLD AUTO: 0.02 K/UL (ref 0–0.04)
IMM GRANULOCYTES NFR BLD AUTO: 0.2 % (ref 0–0.5)
KETONES UR QL STRIP: NEGATIVE
LEUKOCYTE ESTERASE UR QL STRIP: NEGATIVE
LYMPHOCYTES # BLD AUTO: 2.8 K/UL (ref 1–4.8)
LYMPHOCYTES NFR BLD: 24.9 % (ref 18–48)
MCH RBC QN AUTO: 30.6 PG (ref 27–31)
MCHC RBC AUTO-ENTMCNC: 33.8 G/DL (ref 32–36)
MCV RBC AUTO: 91 FL (ref 82–98)
MONOCYTES # BLD AUTO: 0.8 K/UL (ref 0.3–1)
MONOCYTES NFR BLD: 6.8 % (ref 4–15)
NEUTROPHILS # BLD AUTO: 7.3 K/UL (ref 1.8–7.7)
NEUTROPHILS NFR BLD: 65.2 % (ref 38–73)
NITRITE UR QL STRIP: NEGATIVE
NRBC BLD-RTO: 0 /100 WBC
PH UR STRIP: 6 [PH] (ref 5–8)
PLATELET # BLD AUTO: 237 K/UL (ref 150–350)
PMV BLD AUTO: 10.5 FL (ref 9.2–12.9)
POTASSIUM SERPL-SCNC: 4.3 MMOL/L (ref 3.5–5.1)
PROT SERPL-MCNC: 6.8 G/DL (ref 6–8.4)
PROT UR QL STRIP: NEGATIVE
RBC # BLD AUTO: 5.16 M/UL (ref 4.6–6.2)
SODIUM SERPL-SCNC: 137 MMOL/L (ref 136–145)
SP GR UR STRIP: >=1.03 (ref 1–1.03)
URN SPEC COLLECT METH UR: ABNORMAL
UROBILINOGEN UR STRIP-ACNC: NEGATIVE EU/DL
WBC # BLD AUTO: 11.24 K/UL (ref 3.9–12.7)

## 2020-07-31 PROCEDURE — 96375 TX/PRO/DX INJ NEW DRUG ADDON: CPT

## 2020-07-31 PROCEDURE — 81003 URINALYSIS AUTO W/O SCOPE: CPT

## 2020-07-31 PROCEDURE — 63600175 PHARM REV CODE 636 W HCPCS: Performed by: EMERGENCY MEDICINE

## 2020-07-31 PROCEDURE — 99285 EMERGENCY DEPT VISIT HI MDM: CPT | Mod: 25

## 2020-07-31 PROCEDURE — 80053 COMPREHEN METABOLIC PANEL: CPT

## 2020-07-31 PROCEDURE — 96361 HYDRATE IV INFUSION ADD-ON: CPT

## 2020-07-31 PROCEDURE — 25000003 PHARM REV CODE 250: Performed by: EMERGENCY MEDICINE

## 2020-07-31 PROCEDURE — 96374 THER/PROPH/DIAG INJ IV PUSH: CPT

## 2020-07-31 PROCEDURE — 85025 COMPLETE CBC W/AUTO DIFF WBC: CPT

## 2020-07-31 PROCEDURE — 96376 TX/PRO/DX INJ SAME DRUG ADON: CPT

## 2020-07-31 RX ORDER — ONDANSETRON HYDROCHLORIDE 8 MG/1
8 TABLET, FILM COATED ORAL EVERY 8 HOURS PRN
Qty: 15 TABLET | Refills: 0 | Status: SHIPPED | OUTPATIENT
Start: 2020-07-31 | End: 2020-12-04 | Stop reason: CLARIF

## 2020-07-31 RX ORDER — HYDROCODONE BITARTRATE AND ACETAMINOPHEN 5; 325 MG/1; MG/1
2 TABLET ORAL
Status: COMPLETED | OUTPATIENT
Start: 2020-07-31 | End: 2020-07-31

## 2020-07-31 RX ORDER — ONDANSETRON 2 MG/ML
4 INJECTION INTRAMUSCULAR; INTRAVENOUS
Status: COMPLETED | OUTPATIENT
Start: 2020-07-31 | End: 2020-07-31

## 2020-07-31 RX ORDER — TAMSULOSIN HYDROCHLORIDE 0.4 MG/1
0.4 CAPSULE ORAL DAILY
Qty: 10 CAPSULE | Refills: 0 | Status: SHIPPED | OUTPATIENT
Start: 2020-07-31 | End: 2020-12-04 | Stop reason: CLARIF

## 2020-07-31 RX ORDER — PROCHLORPERAZINE EDISYLATE 5 MG/ML
10 INJECTION INTRAMUSCULAR; INTRAVENOUS
Status: COMPLETED | OUTPATIENT
Start: 2020-07-31 | End: 2020-07-31

## 2020-07-31 RX ORDER — MORPHINE SULFATE 4 MG/ML
4 INJECTION, SOLUTION INTRAMUSCULAR; INTRAVENOUS
Status: COMPLETED | OUTPATIENT
Start: 2020-07-31 | End: 2020-07-31

## 2020-07-31 RX ORDER — TAMSULOSIN HYDROCHLORIDE 0.4 MG/1
0.4 CAPSULE ORAL
Status: COMPLETED | OUTPATIENT
Start: 2020-07-31 | End: 2020-07-31

## 2020-07-31 RX ORDER — NAPROXEN 500 MG/1
500 TABLET ORAL 2 TIMES DAILY PRN
Qty: 30 TABLET | Refills: 0 | Status: ON HOLD | OUTPATIENT
Start: 2020-07-31 | End: 2020-10-04 | Stop reason: HOSPADM

## 2020-07-31 RX ORDER — HYDROCODONE BITARTRATE AND ACETAMINOPHEN 7.5; 325 MG/1; MG/1
1 TABLET ORAL EVERY 4 HOURS PRN
Qty: 18 TABLET | Refills: 0 | Status: ON HOLD | OUTPATIENT
Start: 2020-07-31 | End: 2020-10-04 | Stop reason: SDUPTHER

## 2020-07-31 RX ORDER — KETOROLAC TROMETHAMINE 30 MG/ML
30 INJECTION, SOLUTION INTRAMUSCULAR; INTRAVENOUS
Status: COMPLETED | OUTPATIENT
Start: 2020-07-31 | End: 2020-07-31

## 2020-07-31 RX ADMIN — MORPHINE SULFATE 4 MG: 4 INJECTION INTRAVENOUS at 11:07

## 2020-07-31 RX ADMIN — HYDROCODONE BITARTRATE AND ACETAMINOPHEN 2 TABLET: 5; 325 TABLET ORAL at 12:07

## 2020-07-31 RX ADMIN — PROCHLORPERAZINE EDISYLATE 10 MG: 5 INJECTION INTRAMUSCULAR; INTRAVENOUS at 12:07

## 2020-07-31 RX ADMIN — SODIUM CHLORIDE 1000 ML: 0.9 INJECTION, SOLUTION INTRAVENOUS at 10:07

## 2020-07-31 RX ADMIN — KETOROLAC TROMETHAMINE 30 MG: 30 INJECTION, SOLUTION INTRAMUSCULAR at 12:07

## 2020-07-31 RX ADMIN — MORPHINE SULFATE 4 MG: 4 INJECTION INTRAVENOUS at 10:07

## 2020-07-31 RX ADMIN — ONDANSETRON 4 MG: 2 INJECTION INTRAMUSCULAR; INTRAVENOUS at 10:07

## 2020-07-31 RX ADMIN — TAMSULOSIN HYDROCHLORIDE 0.4 MG: 0.4 CAPSULE ORAL at 12:07

## 2020-07-31 NOTE — DISCHARGE INSTRUCTIONS
Take all medications as prescribed    Return to the emergency department for uncontrolled pain, vomiting that is not relieved by medications.    Return if you are unable to urinate, have a high fever, or any other emergent medical concerns.      Thank you for choosing [facility]! We appreciate you coming to us for your medical care. We hope you feel better soon! Please come back to [facility] for all of your future medical needs.    Our goal in the emergency department is to always give you outstanding care and exceptional service. You may receive a survey by mail or e-mail in the next week regarding your experience in our ED. We would greatly appreciate your completing and returning the survey. Your feedback provides us with a way to recognize our staff who give very good care and it helps us learn how to improve when your experience was below our aspiration of excellence.       Sincerely,    Brandon Aguirre MD  Medical Director  Emergency Department  [facility]

## 2020-07-31 NOTE — ED PROVIDER NOTES
Encounter Date: 7/31/2020       History     Chief Complaint   Patient presents with    Abdominal Pain     Right flank pain that began on Wednesday with hematuria. Felt that he had a kidney stone so he hydrated with beer to help him urinate. Pain eased, thought he had passed stone. Recurrence of pain at 5 am this morning with N/V. Presents awake, alert. Vomiting in triage.      HPI   This is a 49 y.o. male who has a past medical history of Kidney stone.     The patient presents to the Emergency Department with right flank pain that started 2 days ago.    Pain is intermittent, severe, in waves, initially improved by drinking beer and water.   Pain recurred at 5AM today, assoc with nausea and vomiting.   Pt denies dysuria, hematuria, fever, diarrhea.   Symptoms are aggravated by nothing.  Symptoms are relieved by nothing.   Patient has prior history of similar symptoms with kidney stones.        Review of patient's allergies indicates:   Allergen Reactions    Pcn [penicillins] Anaphylaxis     Past Medical History:   Diagnosis Date    Kidney stone      History reviewed. No pertinent surgical history.  Family History   Problem Relation Age of Onset    Stroke Mother     Stroke Father     No Known Problems Sister      Social History     Tobacco Use    Smoking status: Current Every Day Smoker     Packs/day: 1.00     Types: Cigarettes   Substance Use Topics    Alcohol use: Yes     Comment: occasional    Drug use: No     Review of Systems   Constitutional: Positive for activity change and appetite change. Negative for fever.   HENT: Negative for congestion and sore throat.    Respiratory: Negative for cough and shortness of breath.    Cardiovascular: Negative for chest pain.   Gastrointestinal: Positive for nausea and vomiting.   Genitourinary: Positive for flank pain. Negative for difficulty urinating, dysuria and hematuria.   Musculoskeletal: Positive for back pain.   Skin: Negative for rash.   Neurological:  Positive for headaches. Negative for dizziness.   Hematological: Does not bruise/bleed easily.   Psychiatric/Behavioral: Positive for sleep disturbance.       Physical Exam     Initial Vitals [07/31/20 0933]   BP Pulse Resp Temp SpO2   (!) 156/99 97 13 98.2 °F (36.8 °C) 97 %      MAP       --         Physical Exam    Nursing note and vitals reviewed.  Constitutional: He appears well-developed and well-nourished. He appears distressed (mild, from pain).   HENT:   Head: Normocephalic and atraumatic.   Mouth/Throat: Oropharynx is clear and moist.   Eyes: Conjunctivae are normal. Pupils are equal, round, and reactive to light.   Neck: Normal range of motion. Neck supple.   Cardiovascular: Normal rate, regular rhythm, normal heart sounds and intact distal pulses.   Pulmonary/Chest: Breath sounds normal. No respiratory distress.   Abdominal: Soft. Bowel sounds are normal. He exhibits no distension. There is abdominal tenderness.   Mild right CVA TTP   Musculoskeletal: Normal range of motion. No tenderness or edema.   Lymphadenopathy:     He has no cervical adenopathy.   Neurological: He is alert and oriented to person, place, and time. He has normal strength. GCS score is 15. GCS eye subscore is 4. GCS verbal subscore is 5. GCS motor subscore is 6.   Skin: Skin is warm and dry. Capillary refill takes less than 2 seconds. No rash noted. No erythema.   Psychiatric: He has a normal mood and affect. Thought content normal.         ED Course   Procedures  Labs Reviewed   URINALYSIS, REFLEX TO URINE CULTURE - Abnormal; Notable for the following components:       Result Value    Specific Gravity, UA >=1.030 (*)     Occult Blood UA Trace (*)     All other components within normal limits    Narrative:     Specimen Source->Urine   COMPREHENSIVE METABOLIC PANEL - Abnormal; Notable for the following components:    CO2 22 (*)     Glucose 122 (*)     Calcium 8.6 (*)     All other components within normal limits   CBC W/ AUTO  DIFFERENTIAL          Imaging Results          CT Renal Stone Study ABD Pelvis WO (Final result)  Result time 07/31/20 11:15:45    Final result by Brandon Ryan DO (07/31/20 11:15:45)                 Impression:      Right-sided obstructive uropathy with mild moderate right hydroureteronephrosis up to a calcified stone the proximal ureter measuring approximately 4 mm.    There is a punctate nonobstructive right renal caliceal stone.    No evidence for left-sided obstructive uropathy.    Clinical correlation and urological evaluation recommended.      Electronically signed by: Brandon Ryan DO  Date:    07/31/2020  Time:    11:15             Narrative:    EXAMINATION:  CT RENAL STONE STUDY ABD PELVIS WO    CLINICAL HISTORY:  Flank pain, kidney stone suspected;    TECHNIQUE:  Low dose axial images, sagittal and coronal reformations were obtained from the lung bases to the pubic symphysis.  Contrast was not administered.    COMPARISON:  09/02/2016    FINDINGS:  There is no consolidation the visualized lungs.  The liver is enlarged measuring 18 cm in craniocaudal dimension.  The spleen and pancreas are normal in size without focal lesions.  The adrenal glands are within normal limits bilaterally.    There is right-sided obstructive uropathy with mild moderate right hydroureteronephrosis up to a calcified stone in the proximal right ureter measuring 3-4 mm.    No evidence for left-sided obstructive uropathy.  No left renal or ureteral calculus.    There is a punctate additional nonobstructive right renal caliceal stone measuring 3-4 mm.    There is fat stranding induration about the right kidney.    No signs of appendicitis with normal caliber appendix identified.    No free intraperitoneal gas or fluid.  No aneurysmal dilatation of the abdominal aorta with scattered atherosclerotic plaquing.    Nonspecific calcification of the prostate and bilateral seminal vesicles.    Left inguinal hernia containing fat.    There  is a small umbilical hernia also containing fat.    No calcified gallstones in the gallbladder by CT criteria    No evidence for acute fracture or subluxation visualized spine.                                 Medical Decision Making:   Initial Assessment:   Patient is a 49 y.o. male who presents with right flank pain, associated with nausea, dry heaving. Pt has hx of kidney stones. Denioes any fever, dysuria or other complaints.      Differential includes but is not limited to: Renal colic, pyelonephritis, bladder or renal mass, intra-abdominal infection such as diverticulitis or unlikely appenditicitis    Plan: Basic labs, UA, CT renal stone study, IV fluids, pain meds, nausea meds                     ED Course as of Jul 31 1712 Fri Jul 31, 2020   1216 Pt still c/o pain. Will give more pain medicine    [NP]      ED Course User Index  [NP] Brandon Aguirre MD            Pt stable for discharge at this time.  Rx for symptom control, flomax and referral to urology.    Discharge process:  -- Clinical impression and plan including any treatment was discussed with patient.   -- Pt is to call for follow up with PCP or referred physician in 7 days.   -- Pt is to return to the ED immediately for any new or worsening symptoms.  -- This may include severe pain, inability to urinate, fever.  -- They are always welcome to return for any emergent concerns.    -- The patient had time for ask questions to which they were addressed.   -- The patient expressed understanding and agrees with my plan.         Clinical Impression:       ICD-10-CM ICD-9-CM   1. Renal colic on right side  N23 788.0   2. Ureteral stone with hydronephrosis  N13.2 592.1     591             ED Disposition Condition    Discharge Stable        ED Prescriptions     Medication Sig Dispense Start Date End Date Auth. Provider    HYDROcodone-acetaminophen (NORCO) 7.5-325 mg per tablet Take 1 tablet by mouth every 4 (four) hours as needed for Pain. 18 tablet  7/31/2020  Brandon Aguirre MD    naproxen (NAPROSYN) 500 MG tablet Take 1 tablet (500 mg total) by mouth 2 (two) times daily as needed (pain). 30 tablet 7/31/2020  Brandon Aguirre MD    tamsulosin (FLOMAX) 0.4 mg Cap Take 1 capsule (0.4 mg total) by mouth once daily. 10 capsule 7/31/2020 7/31/2021 Brandon Aguirre MD    ondansetron (ZOFRAN) 8 MG tablet Take 1 tablet (8 mg total) by mouth every 8 (eight) hours as needed for Nausea. 15 tablet 7/31/2020  Brandon Aguirre MD        Follow-up Information    None                                    Brandon Aguirre MD  07/31/20 1181

## 2020-07-31 NOTE — ED TRIAGE NOTES
Pt presents to ED with complaints of right flank pain. Pt states he believes he has a Kidney stone. Pt states that the pain began on Wednesday. Pt states he drank beer to try to get the stone to pass. Pt states he thought he passed the stone on Wednesday and states he fine great on Thursday. Pt states pain returned around 0500 this am. Pt states pain has not subsided and he has had associated nausea and vomiting.     Patient has verified the spelling of their name and  on armband.   APPEARANCE: Alert, oriented and in no acute distress.  CARDIAC: Normal rate and rhythm.   PERIPHERAL VASCULAR: peripheral pulses present. Normal cap refill. No edema. Warm to touch.    RESPIRATORY:Normal rate and effort. Respirations are equal and unlabored no obvious signs of distress.  GASTRO: soft, bowel sounds normal, no tenderness, no abdominal distention.  GI: Pt states he noticed blood in his urine earlier today  MUSC: Full ROM. No bony tenderness or soft tissue tenderness. No obvious deformity.  SKIN: Skin is warm and dry, normal skin turgor, mucous membranes moist.  NEURO: 5/5 strength major flexors/extensors bilaterally. Sensory intact to light touch bilaterally. [place] coma scale: eyes open spontaneously-4, oriented & converses-5, obeys commands-6. No neurological abnormalities.   MENTAL STATUS: awake, alert and aware of environment.

## 2020-09-18 ENCOUNTER — HOSPITAL ENCOUNTER (EMERGENCY)
Facility: HOSPITAL | Age: 50
Discharge: HOME | End: 2020-09-18
Attending: EMERGENCY MEDICINE
Payer: MEDICAID

## 2020-09-18 VITALS
OXYGEN SATURATION: 100 % | WEIGHT: 190 LBS | BODY MASS INDEX: 25.73 KG/M2 | SYSTOLIC BLOOD PRESSURE: 145 MMHG | DIASTOLIC BLOOD PRESSURE: 79 MMHG | RESPIRATION RATE: 18 BRPM | HEIGHT: 72 IN | HEART RATE: 100 BPM | TEMPERATURE: 99 F

## 2020-09-18 DIAGNOSIS — M79.675 PAIN IN TOE OF LEFT FOOT: Primary | ICD-10-CM

## 2020-09-18 DIAGNOSIS — R52 PAIN: ICD-10-CM

## 2020-09-18 PROCEDURE — 99283 EMERGENCY DEPT VISIT LOW MDM: CPT | Mod: 25

## 2020-09-18 RX ORDER — DEXTROMETHORPHAN HYDROBROMIDE, GUAIFENESIN 5; 100 MG/5ML; MG/5ML
650 LIQUID ORAL EVERY 8 HOURS
Qty: 15 TABLET | Refills: 0 | Status: SHIPPED | OUTPATIENT
Start: 2020-09-18 | End: 2020-09-23

## 2020-09-19 NOTE — ED PROVIDER NOTES
"Encounter Date: 9/18/2020       History     Chief Complaint   Patient presents with    Foot Pain     Patient presents to the ED with reports of having left foot/toe pain after "stubbing it on Wednesday".      Tee Nicole is a 49 y.o. male who  has a past medical history of Kidney stone.    The patient presents to the ED due to L foot pain.  He reports he accidentally stubbed his toe on a piece of furniture 3 days ago.    Since that time, he reports pain that is coming and going.  He denies any current pain.  He is unable to say which toe hurts, as it is not painful currently.  He denies any fall, associated injuries, or any other complaints.  He is not taking any medication for his pain at home, stating "I don't have anything."  He denies any prior injuries or surgeries to the foot.  He denies any other complaints.        Review of patient's allergies indicates:   Allergen Reactions    Pcn [penicillins] Anaphylaxis     Past Medical History:   Diagnosis Date    Kidney stone      No past surgical history on file.  Family History   Problem Relation Age of Onset    Stroke Mother     Stroke Father     No Known Problems Sister      Social History     Tobacco Use    Smoking status: Current Every Day Smoker     Packs/day: 1.00     Types: Cigarettes   Substance Use Topics    Alcohol use: Yes     Comment: occasional    Drug use: No     Review of Systems   Constitutional: Negative for chills and fever.   HENT: Negative for sore throat.    Respiratory: Negative for shortness of breath.    Cardiovascular: Negative for chest pain.   Gastrointestinal: Negative for constipation, diarrhea, nausea and vomiting.   Genitourinary: Negative for dysuria, frequency and urgency.   Musculoskeletal: Positive for arthralgias. Negative for back pain, gait problem, joint swelling, myalgias, neck pain and neck stiffness.   Skin: Negative for rash and wound.   Neurological: Negative for weakness.   Hematological: Does not " bruise/bleed easily.   Psychiatric/Behavioral: Negative for agitation, behavioral problems and confusion.       Physical Exam     Initial Vitals [09/18/20 2130]   BP Pulse Resp Temp SpO2   (!) 144/81 102 18 97.8 °F (36.6 °C) 99 %      MAP       --         Physical Exam    Nursing note and vitals reviewed.  Constitutional: He appears well-developed and well-nourished. He is not diaphoretic. No distress.   HENT:   Head: Normocephalic and atraumatic.   Mouth/Throat: Oropharynx is clear and moist.   Eyes: EOM are normal. Pupils are equal, round, and reactive to light.   Neck: No tracheal deviation present.   Cardiovascular: Normal rate, regular rhythm, normal heart sounds and intact distal pulses.   Distal pulses brisk and symmetric.   Pulmonary/Chest: Breath sounds normal. No stridor. No respiratory distress.   Abdominal: Soft. He exhibits no distension and no mass. There is no abdominal tenderness.   Musculoskeletal: Normal range of motion. No edema.        Left foot: Tenderness and bony tenderness present. No swelling, deformity or laceration.        Feet:       Comments: Mild tenderness with bruising of the left little toe.  No deformity.  Brisk capillary refill.  No tenderness to the bottom of the foot.  Full ankle, knee, hip range of motion.   Neurological: He is alert and oriented to person, place, and time. No cranial nerve deficit or sensory deficit.   Skin: Skin is warm and dry. Capillary refill takes less than 2 seconds. No rash noted.   Psychiatric: He has a normal mood and affect. His behavior is normal. Thought content normal.         ED Course   Procedures  Labs Reviewed - No data to display       Imaging Results          X-Ray Foot Complete Left (Final result)  Result time 09/18/20 22:27:02    Final result by Russell Jimenes MD (09/18/20 22:27:02)                 Impression:      Chronic changes are noted, there is no radiographic evidence for acute fracture or dislocation, close clinical and  historical correlation is otherwise needed to determine need for additional follow-up.      Electronically signed by: Russell Jimenes  Date:    09/18/2020  Time:    22:27             Narrative:    EXAMINATION:  XR FOOT COMPLETE 3 VIEW LEFT    CLINICAL HISTORY:  .  Pain, unspecified    TECHNIQUE:  AP, lateral and oblique views of the left foot were performed.    COMPARISON:  None    FINDINGS:  Radiographic examination of the left foot was performed, 3 radiographs are submitted.  The visualized osseous structures appear intact without radiographic evidence for osseous destructive process, acute fracture or dislocation, chronic changes are noted.  There is no radiographically detectable radiopaque soft tissue foreign body.                                 Medical Decision Making:   History:   Old Medical Records: I decided to obtain old medical records.  Old Records Summarized: other records.       <> Summary of Records: Multiple prior ED in urgent care visits for various complaints.  No prior foot imaging noted.  Initial Assessment:   49-year-old male presents with left 5th toe pain and bruising after stubbing his toe 3 days ago.  Exam with bruising and swelling of the 5th toe.  Will obtain x-ray and reassess.  Differential Diagnosis:   Differential Diagnosis includes, but is not limited to:  Fracture, dislocation, compartment syndrome, nerve injury/palsy, vascular injury, DVT, rhabdomyolysis, hemarthrosis, septic joint, cellulitis, bursitis, muscle strain, ligament tear/sprain, laceration, foreign body, abrasion, soft tissue contusion, osteoarthritis.    Independently Interpreted Test(s):   I have ordered and independently interpreted X-rays - see summary below.       <> Summary of X-Ray Reading(s): X-ray reveals no bony injury, fracture, or dislocation.  Clinical Tests:   Radiological Study: Ordered and Reviewed  ED Management:  X-ray without fracture.  Counseled on symptomatic and supportive care of toe contusion.   Patient stable for discharge.    On re-evaluation, the patient's status has improved.  After complete ED evaluation, clinical impression is most consistent with toe contusion.  PCP follow-up within 2-3 days was recommended.    After taking into careful account the patient's history, physical exam findings, as well as empirical and objective data obtained throughout ED workup, I feel no emergent medical condition has been identified. No further evaluation or admission was felt to be required, and the patient is stable for discharge from the ED. The patient and any additional family present were updated with test results, overall clinical impression, and recommended further plan of care, including discharge instructions as provided and outpatient follow-up for continued evaluation and management as needed. All questions were answered. The patient expressed understanding and agreed with current plan for discharge and follow-up plan of care. Strict ED return precautions were provided, including return/worsening of current symptoms, new symptoms, or any other concerns.                               Clinical Impression:       ICD-10-CM ICD-9-CM   1. Pain in toe of left foot  M79.675 729.5   2. Pain  R52 780.96                          ED Disposition Condition    Discharge Stable        ED Prescriptions     Medication Sig Dispense Start Date End Date Auth. Provider    acetaminophen (TYLENOL) 650 MG TbSR Take 1 tablet (650 mg total) by mouth every 8 (eight) hours. for 5 days 15 tablet 9/18/2020 9/23/2020 Luke Hawkins MD        Follow-up Information     Follow up With Specialties Details Why Contact Info Additional Information    [facility] Schedule an appointment as soon as possible for a visit   [address], [address]  [facility] [address] [phone] At this time [facility] will only use these entries [facility], [facility], and Emergency Department due  to COVID-19 precautions.     [facility] Internal Medicine Schedule an appointment as soon as possible for a visit   [address], [address]  [place] [address] [phone] 2nd Floor [facility], [address] At this time [facility] will only use these entries [facility], [facility], and Emergency Department due to COVID-19 precautions.                                        Luke Hawkins MD  09/19/20 0013

## 2020-10-03 ENCOUNTER — HOSPITAL ENCOUNTER (INPATIENT)
Facility: HOSPITAL | Age: 50
LOS: 1 days | Discharge: LEFT BEFORE DISCHARGE | DRG: 301 | End: 2020-10-04
Attending: EMERGENCY MEDICINE | Admitting: HOSPITALIST
Payer: MEDICAID

## 2020-10-03 DIAGNOSIS — I99.8 ISCHEMIC TOE: ICD-10-CM

## 2020-10-03 DIAGNOSIS — R07.9 CHEST PAIN: ICD-10-CM

## 2020-10-03 DIAGNOSIS — I73.9 PAD (PERIPHERAL ARTERY DISEASE): ICD-10-CM

## 2020-10-03 DIAGNOSIS — I99.8 LIMB ISCHEMIA: ICD-10-CM

## 2020-10-03 DIAGNOSIS — I73.9 PVD (PERIPHERAL VASCULAR DISEASE): Primary | ICD-10-CM

## 2020-10-03 DIAGNOSIS — Z59.7 INSUFFICIENT SOCIAL INSURANCE AND WELFARE SUPPORT: ICD-10-CM

## 2020-10-03 PROCEDURE — 99285 EMERGENCY DEPT VISIT HI MDM: CPT

## 2020-10-03 PROCEDURE — 12000002 HC ACUTE/MED SURGE SEMI-PRIVATE ROOM

## 2020-10-03 SDOH — SOCIAL DETERMINANTS OF HEALTH (SDOH): INSUFFICIENT SOCIAL INSURANCE AND WELFARE SUPPORT: Z59.7

## 2020-10-04 VITALS
BODY MASS INDEX: 26.61 KG/M2 | TEMPERATURE: 96 F | HEIGHT: 72 IN | RESPIRATION RATE: 20 BRPM | SYSTOLIC BLOOD PRESSURE: 144 MMHG | HEART RATE: 75 BPM | WEIGHT: 196.44 LBS | DIASTOLIC BLOOD PRESSURE: 87 MMHG | OXYGEN SATURATION: 97 %

## 2020-10-04 PROBLEM — Z72.0 TOBACCO USE: Status: ACTIVE | Noted: 2020-10-04

## 2020-10-04 PROBLEM — Z59.7 INSUFFICIENT SOCIAL INSURANCE AND WELFARE SUPPORT: Status: ACTIVE | Noted: 2020-10-04

## 2020-10-04 PROBLEM — I99.8 ISCHEMIC TOE: Status: ACTIVE | Noted: 2020-10-04

## 2020-10-04 PROBLEM — I73.9 PAD (PERIPHERAL ARTERY DISEASE): Status: ACTIVE | Noted: 2020-10-04

## 2020-10-04 LAB
ALBUMIN SERPL BCP-MCNC: 3.6 G/DL (ref 3.5–5.2)
ALP SERPL-CCNC: 93 U/L (ref 55–135)
ALT SERPL W/O P-5'-P-CCNC: 24 U/L (ref 10–44)
ANION GAP SERPL CALC-SCNC: 11 MMOL/L (ref 8–16)
ANION GAP SERPL CALC-SCNC: 15 MMOL/L (ref 8–16)
AST SERPL-CCNC: 21 U/L (ref 10–40)
BASOPHILS # BLD AUTO: 0.04 K/UL (ref 0–0.2)
BASOPHILS # BLD AUTO: NORMAL K/UL (ref 0–0.2)
BASOPHILS NFR BLD: 0 % (ref 0–1.9)
BASOPHILS NFR BLD: 0.3 % (ref 0–1.9)
BILIRUB SERPL-MCNC: 0.2 MG/DL (ref 0.1–1)
BUN SERPL-MCNC: 12 MG/DL (ref 6–20)
BUN SERPL-MCNC: 13 MG/DL (ref 6–20)
CALCIUM SERPL-MCNC: 8.5 MG/DL (ref 8.7–10.5)
CALCIUM SERPL-MCNC: 8.6 MG/DL (ref 8.7–10.5)
CHLORIDE SERPL-SCNC: 105 MMOL/L (ref 95–110)
CHLORIDE SERPL-SCNC: 105 MMOL/L (ref 95–110)
CO2 SERPL-SCNC: 19 MMOL/L (ref 23–29)
CO2 SERPL-SCNC: 22 MMOL/L (ref 23–29)
CREAT SERPL-MCNC: 1 MG/DL (ref 0.5–1.4)
CREAT SERPL-MCNC: 1 MG/DL (ref 0.5–1.4)
DIFFERENTIAL METHOD: NORMAL
DIFFERENTIAL METHOD: NORMAL
EOSINOPHIL # BLD AUTO: 0.2 K/UL (ref 0–0.5)
EOSINOPHIL # BLD AUTO: NORMAL K/UL (ref 0–0.5)
EOSINOPHIL NFR BLD: 2 % (ref 0–8)
EOSINOPHIL NFR BLD: 4 % (ref 0–8)
ERYTHROCYTE [DISTWIDTH] IN BLOOD BY AUTOMATED COUNT: 13.6 % (ref 11.5–14.5)
ERYTHROCYTE [DISTWIDTH] IN BLOOD BY AUTOMATED COUNT: 13.8 % (ref 11.5–14.5)
EST. GFR  (AFRICAN AMERICAN): >60 ML/MIN/1.73 M^2
EST. GFR  (AFRICAN AMERICAN): >60 ML/MIN/1.73 M^2
EST. GFR  (NON AFRICAN AMERICAN): >60 ML/MIN/1.73 M^2
EST. GFR  (NON AFRICAN AMERICAN): >60 ML/MIN/1.73 M^2
ESTIMATED AVG GLUCOSE: 114 MG/DL (ref 68–131)
GLUCOSE SERPL-MCNC: 111 MG/DL (ref 70–110)
GLUCOSE SERPL-MCNC: 124 MG/DL (ref 70–110)
HBA1C MFR BLD HPLC: 5.6 % (ref 4–5.6)
HCT VFR BLD AUTO: 42.5 % (ref 40–54)
HCT VFR BLD AUTO: 46.3 % (ref 40–54)
HGB BLD-MCNC: 14.4 G/DL (ref 14–18)
HGB BLD-MCNC: 15.1 G/DL (ref 14–18)
IMM GRANULOCYTES # BLD AUTO: 0.02 K/UL (ref 0–0.04)
IMM GRANULOCYTES # BLD AUTO: NORMAL K/UL (ref 0–0.04)
IMM GRANULOCYTES NFR BLD AUTO: 0.2 % (ref 0–0.5)
IMM GRANULOCYTES NFR BLD AUTO: NORMAL % (ref 0–0.5)
INR PPP: 1 (ref 0.8–1.2)
LYMPHOCYTES # BLD AUTO: 4 K/UL (ref 1–4.8)
LYMPHOCYTES # BLD AUTO: NORMAL K/UL (ref 1–4.8)
LYMPHOCYTES NFR BLD: 34.7 % (ref 18–48)
LYMPHOCYTES NFR BLD: 37 % (ref 18–48)
MAGNESIUM SERPL-MCNC: 2.2 MG/DL (ref 1.6–2.6)
MCH RBC QN AUTO: 30.1 PG (ref 27–31)
MCH RBC QN AUTO: 30.8 PG (ref 27–31)
MCHC RBC AUTO-ENTMCNC: 32.6 G/DL (ref 32–36)
MCHC RBC AUTO-ENTMCNC: 33.9 G/DL (ref 32–36)
MCV RBC AUTO: 91 FL (ref 82–98)
MCV RBC AUTO: 92 FL (ref 82–98)
MONOCYTES # BLD AUTO: 1 K/UL (ref 0.3–1)
MONOCYTES # BLD AUTO: NORMAL K/UL (ref 0.3–1)
MONOCYTES NFR BLD: 8 % (ref 4–15)
MONOCYTES NFR BLD: 8.3 % (ref 4–15)
NEUTROPHILS # BLD AUTO: 6.3 K/UL (ref 1.8–7.7)
NEUTROPHILS # BLD AUTO: NORMAL K/UL (ref 1.8–7.7)
NEUTROPHILS NFR BLD: 51 % (ref 38–73)
NEUTROPHILS NFR BLD: 54.5 % (ref 38–73)
NRBC BLD-RTO: 0 /100 WBC
NRBC BLD-RTO: 0 /100 WBC
PLATELET # BLD AUTO: 230 K/UL (ref 150–350)
PLATELET # BLD AUTO: 234 K/UL (ref 150–350)
PLATELET BLD QL SMEAR: NORMAL
PLATELET BLD QL SMEAR: NORMAL
PMV BLD AUTO: 10.1 FL (ref 9.2–12.9)
PMV BLD AUTO: 10.2 FL (ref 9.2–12.9)
POTASSIUM SERPL-SCNC: 4.2 MMOL/L (ref 3.5–5.1)
POTASSIUM SERPL-SCNC: 4.5 MMOL/L (ref 3.5–5.1)
PROT SERPL-MCNC: 6.7 G/DL (ref 6–8.4)
PROTHROMBIN TIME: 10.1 SEC (ref 9–12.5)
RBC # BLD AUTO: 4.68 M/UL (ref 4.6–6.2)
RBC # BLD AUTO: 5.02 M/UL (ref 4.6–6.2)
SARS-COV-2 RDRP RESP QL NAA+PROBE: NEGATIVE
SODIUM SERPL-SCNC: 138 MMOL/L (ref 136–145)
SODIUM SERPL-SCNC: 139 MMOL/L (ref 136–145)
WBC # BLD AUTO: 10.88 K/UL (ref 3.9–12.7)
WBC # BLD AUTO: 11.54 K/UL (ref 3.9–12.7)

## 2020-10-04 PROCEDURE — 36415 COLL VENOUS BLD VENIPUNCTURE: CPT

## 2020-10-04 PROCEDURE — 99232 PR SUBSEQUENT HOSPITAL CARE,LEVL II: ICD-10-PCS | Mod: ,,, | Performed by: INTERNAL MEDICINE

## 2020-10-04 PROCEDURE — 25000003 PHARM REV CODE 250: Performed by: NURSE PRACTITIONER

## 2020-10-04 PROCEDURE — 25000003 PHARM REV CODE 250: Performed by: EMERGENCY MEDICINE

## 2020-10-04 PROCEDURE — 99232 PR SUBSEQUENT HOSPITAL CARE,LEVL II: ICD-10-PCS | Mod: ,,, | Performed by: PODIATRIST

## 2020-10-04 PROCEDURE — 85007 BL SMEAR W/DIFF WBC COUNT: CPT

## 2020-10-04 PROCEDURE — 85025 COMPLETE CBC W/AUTO DIFF WBC: CPT

## 2020-10-04 PROCEDURE — 99232 SBSQ HOSP IP/OBS MODERATE 35: CPT | Mod: ,,, | Performed by: PODIATRIST

## 2020-10-04 PROCEDURE — 63600175 PHARM REV CODE 636 W HCPCS: Performed by: EMERGENCY MEDICINE

## 2020-10-04 PROCEDURE — 63600175 PHARM REV CODE 636 W HCPCS: Performed by: HOSPITALIST

## 2020-10-04 PROCEDURE — 85027 COMPLETE CBC AUTOMATED: CPT

## 2020-10-04 PROCEDURE — 25000003 PHARM REV CODE 250: Performed by: HOSPITALIST

## 2020-10-04 PROCEDURE — U0002 COVID-19 LAB TEST NON-CDC: HCPCS

## 2020-10-04 PROCEDURE — 63600175 PHARM REV CODE 636 W HCPCS: Performed by: INTERNAL MEDICINE

## 2020-10-04 PROCEDURE — 99232 SBSQ HOSP IP/OBS MODERATE 35: CPT | Mod: ,,, | Performed by: INTERNAL MEDICINE

## 2020-10-04 PROCEDURE — 83735 ASSAY OF MAGNESIUM: CPT

## 2020-10-04 PROCEDURE — 85610 PROTHROMBIN TIME: CPT

## 2020-10-04 PROCEDURE — 80048 BASIC METABOLIC PNL TOTAL CA: CPT

## 2020-10-04 PROCEDURE — 83036 HEMOGLOBIN GLYCOSYLATED A1C: CPT

## 2020-10-04 PROCEDURE — 11000001 HC ACUTE MED/SURG PRIVATE ROOM

## 2020-10-04 PROCEDURE — 80053 COMPREHEN METABOLIC PANEL: CPT

## 2020-10-04 RX ORDER — HYDROCODONE BITARTRATE AND ACETAMINOPHEN 7.5; 325 MG/1; MG/1
1 TABLET ORAL EVERY 4 HOURS PRN
Qty: 18 TABLET | Refills: 0 | Status: SHIPPED | OUTPATIENT
Start: 2020-10-04 | End: 2020-10-13 | Stop reason: SDUPTHER

## 2020-10-04 RX ORDER — ASPIRIN 325 MG
325 TABLET, DELAYED RELEASE (ENTERIC COATED) ORAL
Status: COMPLETED | OUTPATIENT
Start: 2020-10-04 | End: 2020-10-04

## 2020-10-04 RX ORDER — ATORVASTATIN CALCIUM 80 MG/1
80 TABLET, FILM COATED ORAL NIGHTLY
Qty: 90 TABLET | Refills: 3 | Status: SHIPPED | OUTPATIENT
Start: 2020-10-04 | End: 2021-01-04 | Stop reason: SDUPTHER

## 2020-10-04 RX ORDER — ENOXAPARIN SODIUM 100 MG/ML
80 INJECTION SUBCUTANEOUS
Status: DISCONTINUED | OUTPATIENT
Start: 2020-10-04 | End: 2020-10-04 | Stop reason: HOSPADM

## 2020-10-04 RX ORDER — ATORVASTATIN CALCIUM 40 MG/1
80 TABLET, FILM COATED ORAL NIGHTLY
Status: DISCONTINUED | OUTPATIENT
Start: 2020-10-04 | End: 2020-10-04 | Stop reason: HOSPADM

## 2020-10-04 RX ORDER — SODIUM CHLORIDE 0.9 % (FLUSH) 0.9 %
10 SYRINGE (ML) INJECTION
Status: DISCONTINUED | OUTPATIENT
Start: 2020-10-04 | End: 2020-10-04 | Stop reason: HOSPADM

## 2020-10-04 RX ORDER — ACETAMINOPHEN 325 MG/1
650 TABLET ORAL EVERY 4 HOURS PRN
Status: DISCONTINUED | OUTPATIENT
Start: 2020-10-04 | End: 2020-10-04 | Stop reason: HOSPADM

## 2020-10-04 RX ORDER — NAPROXEN SODIUM 220 MG/1
81 TABLET, FILM COATED ORAL DAILY
Qty: 30 TABLET | Refills: 11 | Status: SHIPPED | OUTPATIENT
Start: 2020-10-05 | End: 2021-01-04

## 2020-10-04 RX ORDER — CLOPIDOGREL BISULFATE 75 MG/1
300 TABLET ORAL
Status: COMPLETED | OUTPATIENT
Start: 2020-10-04 | End: 2020-10-04

## 2020-10-04 RX ORDER — ONDANSETRON 2 MG/ML
4 INJECTION INTRAMUSCULAR; INTRAVENOUS EVERY 8 HOURS PRN
Status: DISCONTINUED | OUTPATIENT
Start: 2020-10-04 | End: 2020-10-04 | Stop reason: HOSPADM

## 2020-10-04 RX ORDER — CLOPIDOGREL BISULFATE 75 MG/1
75 TABLET ORAL DAILY
Qty: 30 TABLET | Refills: 11 | Status: SHIPPED | OUTPATIENT
Start: 2020-10-05 | End: 2021-07-25 | Stop reason: CLARIF

## 2020-10-04 RX ORDER — HYDROCODONE BITARTRATE AND ACETAMINOPHEN 5; 325 MG/1; MG/1
1 TABLET ORAL EVERY 8 HOURS PRN
Status: DISCONTINUED | OUTPATIENT
Start: 2020-10-04 | End: 2020-10-04 | Stop reason: HOSPADM

## 2020-10-04 RX ORDER — SODIUM CHLORIDE 9 MG/ML
1000 INJECTION, SOLUTION INTRAVENOUS
Status: COMPLETED | OUTPATIENT
Start: 2020-10-04 | End: 2020-10-04

## 2020-10-04 RX ORDER — MORPHINE SULFATE 4 MG/ML
4 INJECTION, SOLUTION INTRAMUSCULAR; INTRAVENOUS
Status: COMPLETED | OUTPATIENT
Start: 2020-10-04 | End: 2020-10-04

## 2020-10-04 RX ORDER — CLOPIDOGREL BISULFATE 75 MG/1
75 TABLET ORAL DAILY
Status: DISCONTINUED | OUTPATIENT
Start: 2020-10-04 | End: 2020-10-04 | Stop reason: HOSPADM

## 2020-10-04 RX ORDER — MORPHINE SULFATE 2 MG/ML
2 INJECTION, SOLUTION INTRAMUSCULAR; INTRAVENOUS EVERY 4 HOURS PRN
Status: DISCONTINUED | OUTPATIENT
Start: 2020-10-04 | End: 2020-10-04 | Stop reason: HOSPADM

## 2020-10-04 RX ORDER — ENOXAPARIN SODIUM 100 MG/ML
1 INJECTION SUBCUTANEOUS
Status: COMPLETED | OUTPATIENT
Start: 2020-10-04 | End: 2020-10-04

## 2020-10-04 RX ORDER — ONDANSETRON 2 MG/ML
4 INJECTION INTRAMUSCULAR; INTRAVENOUS
Status: COMPLETED | OUTPATIENT
Start: 2020-10-04 | End: 2020-10-04

## 2020-10-04 RX ORDER — NAPROXEN SODIUM 220 MG/1
81 TABLET, FILM COATED ORAL DAILY
Status: DISCONTINUED | OUTPATIENT
Start: 2020-10-04 | End: 2020-10-04 | Stop reason: HOSPADM

## 2020-10-04 RX ADMIN — ASPIRIN 325 MG: 325 TABLET, COATED ORAL at 02:10

## 2020-10-04 RX ADMIN — ACETAMINOPHEN 650 MG: 325 TABLET ORAL at 04:10

## 2020-10-04 RX ADMIN — ONDANSETRON 4 MG: 2 INJECTION INTRAMUSCULAR; INTRAVENOUS at 02:10

## 2020-10-04 RX ADMIN — SODIUM CHLORIDE 1000 ML: 0.9 INJECTION, SOLUTION INTRAVENOUS at 03:10

## 2020-10-04 RX ADMIN — ENOXAPARIN SODIUM 80 MG: 80 INJECTION SUBCUTANEOUS at 01:10

## 2020-10-04 RX ADMIN — MORPHINE SULFATE 4 MG: 4 INJECTION INTRAVENOUS at 02:10

## 2020-10-04 RX ADMIN — CLOPIDOGREL 300 MG: 75 TABLET, FILM COATED ORAL at 02:10

## 2020-10-04 RX ADMIN — HYDROCODONE BITARTRATE AND ACETAMINOPHEN 1 TABLET: 5; 325 TABLET ORAL at 04:10

## 2020-10-04 RX ADMIN — MORPHINE SULFATE 2 MG: 2 INJECTION, SOLUTION INTRAMUSCULAR; INTRAVENOUS at 09:10

## 2020-10-04 RX ADMIN — ASPIRIN 81 MG 81 MG: 81 TABLET ORAL at 09:10

## 2020-10-04 RX ADMIN — ENOXAPARIN SODIUM 90 MG: 100 INJECTION SUBCUTANEOUS at 02:10

## 2020-10-04 RX ADMIN — CLOPIDOGREL 75 MG: 75 TABLET, FILM COATED ORAL at 09:10

## 2020-10-04 NOTE — PLAN OF CARE
"Pt arrived to unit via wheelchair. No tele monitor ordered. Pt ambulated in the room. Pt states, "standing makes the pain go away". Pain med given prior to arriving to the unit. IV fluids going. POC reviewed with pt. PT updated on NPO status. Nurse will continue to monitor.  "

## 2020-10-04 NOTE — CONSULTS
[facility]  Cardiology  Consult Note    Patient Name: Tee Nicole  MRN: [id number]  Admission Date: 10/3/2020  Hospital Length of Stay: 0 days  Code Status: Full Code   Attending Provider: [facility]   Consulting Provider: Sergo Wing MD  Primary Care Physician: Primary Doctor No  Principal Problem:Ischemic toe    Patient information was obtained from patient and chart review       Consults  Subjective:     Chief Complaint:  L 5th toe pain and discoloration     HPI:       Point she 50-year-old male presents to the emergency room complaining of left 5th toe pain and discoloration.  Patient recalled starting his toes 7 months ago.  Patient had persistent in he recalls stepping historian recently.  Patient presents to the emergency room for further evaluation the patient denies any previous history of cardiovascular disease.  Is questionable claudication however no limitations with daily activities.  Patient smokes 1 pack per day.      Foot x-ray emergency room I revealed no signs.      Arterial ultrasounds are revealed a patent common femoral, SFA, popliteal, and below-knee vessels with overall monophasic flow throughout concerning for iliac disease.      BUN and creatinine are 13/1.0.  H/H 14.442.5.  Platelet count 234.  Hemoglobin A1c 5.6.  Covid status negative        Past Medical History:   Diagnosis Date    Kidney stone        History reviewed. No pertinent surgical history.    Review of patient's allergies indicates:   Allergen Reactions    Pcn [penicillins] Anaphylaxis       No current facility-administered medications on file prior to encounter.      Current Outpatient Medications on File Prior to Encounter   Medication Sig    HYDROcodone-acetaminophen (NORCO) 7.5-325 mg per tablet Take 1 tablet by mouth every 4 (four) hours as needed for Pain.    naproxen (NAPROSYN) 500 MG tablet Take 1 tablet (500 mg total) by mouth 2 (two) times daily as needed (pain).    ondansetron (ZOFRAN)  8 MG tablet Take 1 tablet (8 mg total) by mouth every 8 (eight) hours as needed for Nausea.    tamsulosin (FLOMAX) 0.4 mg Cap Take 1 capsule (0.4 mg total) by mouth once daily.     Family History     Problem Relation (Age of Onset)    No Known Problems Sister    Stroke Mother, Father        Tobacco Use    Smoking status: Current Every Day Smoker     Packs/day: 1.00     Types: Cigarettes    Smokeless tobacco: Never Used   Substance and Sexual Activity    Alcohol use: Yes     Comment: occasional    Drug use: No    Sexual activity: Not on file     Review of Systems   Constitution: Negative for diaphoresis, night sweats, weight gain and weight loss.   HENT: Negative for congestion.    Eyes: Negative for blurred vision, discharge and double vision.   Cardiovascular: Positive for claudication. Negative for chest pain, cyanosis, dyspnea on exertion, irregular heartbeat, leg swelling, near-syncope, orthopnea, palpitations, paroxysmal nocturnal dyspnea and syncope.   Respiratory: Negative for cough, shortness of breath and wheezing.    Endocrine: Negative for cold intolerance, heat intolerance and polyphagia.   Hematologic/Lymphatic: Negative for adenopathy and bleeding problem. Does not bruise/bleed easily.   Skin: Positive for color change (L 5th toe cyanosis ). Negative for dry skin and nail changes.   Musculoskeletal: Negative for arthritis, back pain, falls, joint pain, myalgias and neck pain.   Gastrointestinal: Negative for bloating, abdominal pain, change in bowel habit and constipation.   Genitourinary: Negative for bladder incontinence, dysuria, flank pain, genital sores and missed menses.   Neurological: Negative for aphonia, brief paralysis, difficulty with concentration, dizziness and weakness.   Psychiatric/Behavioral: Negative for altered mental status and memory loss. The patient does not have insomnia.    Allergic/Immunologic: Negative for environmental allergies.     Objective:     Vital Signs (Most  Recent):  Temp: 96.3 °F (35.7 °C) (10/04/20 1213)  Pulse: 75 (10/04/20 1213)  Resp: 20 (10/04/20 1213)  BP: (!) 144/87 (10/04/20 1213)  SpO2: 97 % (10/04/20 1213) Vital Signs (24h Range):  Temp:  [96.3 °F (35.7 °C)-97.7 °F (36.5 °C)] 96.3 °F (35.7 °C)  Pulse:  [] 75  Resp:  [16-20] 20  SpO2:  [97 %-98 %] 97 %  BP: (131-169)/(74-93) 144/87     Weight: 89.1 kg (196 lb 6.9 oz)  Body mass index is 26.64 kg/m².    SpO2: 97 %  O2 Device (Oxygen Therapy): room air      Intake/Output Summary (Last 24 hours) at 10/4/2020 1227  Last data filed at 10/4/2020 1000  Gross per 24 hour   Intake --   Output 200 ml   Net -200 ml       Lines/Drains/Airways     Peripheral Intravenous Line                 Peripheral IV - Single Lumen 10/04/20 0050 20 G Right Wrist less than 1 day                Physical Exam   Constitutional: He is oriented to person, place, and time. He appears well-developed and well-nourished. He is not intubated.   HENT:   Head: Normocephalic and atraumatic.   Right Ear: External ear normal.   Left Ear: External ear normal.   Mouth/Throat: Oropharynx is clear and moist.   Eyes: Pupils are equal, round, and reactive to light. Conjunctivae and EOM are normal. Right eye exhibits no discharge. Left eye exhibits no discharge. No scleral icterus.   Neck: Normal range of motion. Neck supple. Normal carotid pulses, no hepatojugular reflux and no JVD present. Carotid bruit is not present. No tracheal deviation present. No thyromegaly present.   Cardiovascular: Normal rate, regular rhythm, S1 normal and S2 normal.  No extrasystoles are present. PMI is not displaced. Exam reveals no gallop, no S3, no distant heart sounds, no friction rub and no midsystolic click.   No murmur heard.  Pulses:       Carotid pulses are 2+ on the right side and 2+ on the left side.       Radial pulses are 2+ on the right side and 2+ on the left side.        Femoral pulses are 1+ on the right side and 1+ on the left side.       Popliteal  pulses are 1+ on the right side and 1+ on the left side.        Dorsalis pedis pulses are 0 on the right side and 0 on the left side.        Posterior tibial pulses are 0 on the right side and 0 on the left side.   Biphasic R DP and PT doppler signas      Triphasic L DP and PT doppler signals          Pulmonary/Chest: Effort normal and breath sounds normal. No accessory muscle usage or stridor. No apnea, no tachypnea and no bradypnea. He is not intubated. No respiratory distress. He has no decreased breath sounds. He has no wheezes. He has no rales. He exhibits no tenderness and no bony tenderness.   Abdominal: He exhibits no distension, no pulsatile liver, no abdominal bruit, no ascites, no pulsatile midline mass and no mass. There is no abdominal tenderness. There is no rebound and no guarding.   Musculoskeletal: Normal range of motion.         General: No tenderness or edema.   Lymphadenopathy:     He has no cervical adenopathy.   Neurological: He is alert and oriented to person, place, and time. He has normal reflexes. No cranial nerve deficit. Coordination normal.   Skin: Skin is warm. No rash noted. No erythema. No pallor.       L 5th toe cyanosis    Psychiatric: He has a normal mood and affect. His behavior is normal. Judgment and thought content normal.                           Significant Labs:       LABS  CBC  Recent Labs   Lab 10/04/20  0050 10/04/20  0456   WBC 11.54 10.88   RBC 4.68 5.02   HGB 14.4 15.1   HCT 42.5 46.3    230   MCV 91 92   MCH 30.8 30.1   MCHC 33.9 32.6     BMP  Recent Labs   Lab 10/04/20  0050 10/04/20  0456    139   K 4.2 4.5   CO2 22* 19*    105   BUN 12 13   CREATININE 1.0 1.0   * 111*       POCT-Glucose  No results found for: POCTGLUCOSE    Recent Labs   Lab 10/04/20  0050 10/04/20  0456   CALCIUM 8.5* 8.6*   MG  --  2.2     LFT  Recent Labs   Lab 10/04/20  0050   PROT 6.7   ALBUMIN 3.6   BILITOT 0.2   AST 21   ALKPHOS 93   ALT 24     GFR      JOSE  Recent Labs   Lab 10/04/20  0050   INR 1.0     C  LAST HbA1c  Lab Results   Component Value Date    HGBA1C 5.6 10/04/2020       Lipid panel        Significant Imaging:       Imaging Results          X-Ray Foot Complete Left (Final result)  Result time 10/04/20 02:50:41    Final result by Deng Arias MD (10/04/20 02:50:41)                 Impression:      No obvious evidence of an acute osseous injury involving the left foot.      Electronically signed by: Deng Arias  Date:    10/04/2020  Time:    02:50             Narrative:    EXAMINATION:  XR FOOT COMPLETE 3 VIEW LEFT    CLINICAL HISTORY:  left toe pain, discoloration, osteo?;.    TECHNIQUE:  AP, lateral and oblique views of the left foot were performed.    COMPARISON:  September 18, 2020    FINDINGS:  Multiple views of the left foot reveals no obvious evidence of an acute fracture injury of the phalanges metatarsals midfoot or hindfoot.  The articular surfaces are smooth.  No obvious erosive changes involving any of the bones.  There is a generalized coarse trabecular pattern indicating diffuse osteopenia.  No evidence of soft tissue swelling or foreign body.                               US Lower Extremity Arteries Bilateral (Final result)  Result time 10/04/20 01:51:29    Final result by Liliana Bauer MD (10/04/20 01:51:29)                 Impression:      No hemodynamically significant focal stenosis demonstrated in the right or left lower extremity arterial system.    Waveform blunting throughout the lower extremities of uncertain etiology or significance.  Recent noncontrast abdomen pelvis CT 07/31/2020 demonstrated no significant more proximal atherosclerotic calcification to suggest significant in the pelvis or abdominal aorta.  S      Electronically signed by: Liliana Bauer  Date:    10/04/2020  Time:    01:51             Narrative:    EXAMINATION:  US LOWER EXTREMITY ARTERIES BILATERAL    CLINICAL HISTORY:  Other disorder of  circulatory system    TECHNIQUE:  Doppler evaluation of the lower extremity arteries was performed with color and pulsed Doppler.    COMPARISON:  CT abdomen pelvis without contrast 07/31/2020    FINDINGS:  The ankle brachial index on the right is  and on the left is .    The peak systolic velocities on the right are as follows, in centimeters/second:    Common femoral artery: 75.3, profundus femoral 36    Superficial femoral artery, proximal: 76.2    Superficial femoral artery, mid portion: 55    Superficial femoral artery, distal: 57    Popliteal artery: 46.6 approximately 40.6 distally    Posterior tibial artery: 41.7    Anterior tibial artery: 29.5 9    Arteries are patent with no focal stenoses.  Waveforms are monophasic raising question of possible inflow disease more proximally.    The peak systolic velocities on the left are as follows, in centimeters/second:    Common femoral artery: 84, profundus femoral 50.5    Superficial femoral artery, proximal: 52.7    Superficial femoral artery, mid portion: 46.6    Superficial femoral artery, distal: 42    Popliteal artery: 52    Posterior tibial artery: 42    Anterior tibial artery: 31    No evidence of focal stenosis.  Monophasic waveforms are noted raising question of inflow disease more proximally.                                Assessment and Plan:     Active Diagnoses:    Diagnosis Date Noted POA    PRINCIPAL PROBLEM:  Ischemic toe [I99.8] 10/04/2020 Yes    PAD (peripheral artery disease) [I73.9] 10/04/2020 Yes    Tobacco use [Z72.0] 10/04/2020 Yes      Problems Resolved During this Admission:       VTE Risk Mitigation (From admission, onward)    None          Patient presenting with left 5th toe ischemia is stenosis.  Presentation is concerning for possible aortao-iliac disease affecting his inflow or embolization.  Patient has tobacco use as a a risk factor for PAD. Obtain abdominal CTA and runoff to assess his vascular anatomy. Echocardiogram with bubble  study to assess ejection fraction, valves, and rule out shunts.  Smoking cessation was discussed at length with the patient.  Agree with aspirin, plavix, statin therapy, and ACE-inhibitor if he has HTN. Obtain ESR, CRP, and lipid panel.    Thank you for your consult.     Sergo Wing MD  Cardiology   [facility]

## 2020-10-04 NOTE — PLAN OF CARE
10/04/20 1452   Final Note   Assessment Type Final Discharge Note   Anticipated Discharge Disposition Left Against  (pt left AMA)     Juana Perez RN Transitional Navigator  [phone]

## 2020-10-04 NOTE — HPI
"Mr. Nicole is a 49yo man with no known past medical history who presents with left 5th toe pain. He states that he stubbed his toe a few months ago and since that time, he has had pain and some discoloration of the toe. He states he was able to "get by" for a long time, but 4d ago he developed substantial worsening. Toe is purple in color and painful. Pain improves upon standing. Worse with elevation of extremity. He has not been able to sleep 2/2 pain. Not on any meds at home. Does smoke 1ppd. No prior cardiac history. 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  "

## 2020-10-04 NOTE — CONSULTS
[facility]  Podiatry  Consult Note    Patient Name: Tee Nicole  MRN: [id number]  Admission Date: 10/3/2020  Hospital Length of Stay: 1 days  Attending Physician: No att. providers found  Primary Care Provider: Primary Doctor No     Inpatient consult to Podiatry  Consult performed by: Ronnie Mcdonald MD  Consult ordered by: Ronnie Munguia MD        Subjective:     History of Present Illness: 51 yo M with Hx of kidney stones, tobacco use, alcohol use presented with left 5th digit pain and discoloration since 09/30. He also stubbed this digit on 09/18 and reported to the ED where X rays were performed and it was found to not be fractured. He is unsure how much the pain improved after that injury but it definitely began to worsen within the past week. The digit also began to turn blue-purple within the past week. He reports up to 8/10 pain which is improved by standing, not improved by hanging leg off bed. Interventional cardiology consulted, medical optimization of circulation has begun with ASA, Plavix, atorvastatin. CTA with runoff and TTE with bubble planned for further evaluation.     Scheduled Meds:   aspirin  81 mg Oral Daily    atorvastatin  80 mg Oral QHS    clopidogreL  75 mg Oral Daily    enoxparin  80 mg Subcutaneous Q12H     Continuous Infusions:  PRN Meds:acetaminophen, HYDROcodone-acetaminophen, morphine, ondansetron, sodium chloride 0.9%    Review of patient's allergies indicates:   Allergen Reactions    Pcn [penicillins] Anaphylaxis        Past Medical History:   Diagnosis Date    Kidney stone      History reviewed. No pertinent surgical history.    Family History     Problem Relation (Age of Onset)    No Known Problems Sister    Stroke Mother, Father        Tobacco Use    Smoking status: Current Every Day Smoker     Packs/day: 1.00     Types: Cigarettes    Smokeless tobacco: Never Used   Substance and Sexual Activity    Alcohol use: Yes     Comment:  occasional    Drug use: No    Sexual activity: Not on file     Review of Systems   Constitutional: Negative for chills and fever.   HENT: Negative for ear pain.    Eyes: Negative for pain.   Respiratory: Negative for cough and shortness of breath.    Cardiovascular: Negative for chest pain and leg swelling.   Gastrointestinal: Negative for constipation, diarrhea, nausea and vomiting.   Genitourinary: Negative for dysuria.   Musculoskeletal: Negative for back pain and neck pain.   Skin: Positive for color change. Negative for wound.   Neurological: Positive for numbness. Negative for headaches.   Psychiatric/Behavioral: Positive for sleep disturbance. Negative for agitation and confusion.     Objective:     Vital Signs (Most Recent):  Temp: 96.3 °F (35.7 °C) (10/04/20 1213)  Pulse: 75 (10/04/20 1213)  Resp: 20 (10/04/20 1213)  BP: (!) 144/87 (10/04/20 1213)  SpO2: 97 % (10/04/20 1213) Vital Signs (24h Range):  Temp:  [96.3 °F (35.7 °C)-97.7 °F (36.5 °C)] 96.3 °F (35.7 °C)  Pulse:  [] 75  Resp:  [16-20] 20  SpO2:  [97 %-98 %] 97 %  BP: (131-169)/(74-93) 144/87     Weight: 89.1 kg (196 lb 6.9 oz)  Body mass index is 26.64 kg/m².    Foot Exam    General  Orientation: alert and oriented to person, place, and time   Affect: appropriate   Gait: antalgic       Right Foot/Ankle     Inspection and Palpation  Ecchymosis: none  Tenderness: none   Swelling: none   Skin Exam: skin intact;     Neurovascular  Dorsalis pedis: absent  Posterior tibial: absent  Saphenous nerve sensation: normal  Tibial nerve sensation: normal  Superficial peroneal nerve sensation: normal  Deep peroneal nerve sensation: normal      Left Foot/Ankle      Inspection and Palpation  Ecchymosis: none  Tenderness: (5th digit)  Swelling: none   Skin Exam: abnormal color;     Neurovascular  Dorsalis pedis: absent  Posterior tibial: absent  Saphenous nerve sensation: normal  Tibial nerve sensation: normal  Superficial peroneal nerve sensation:  normal  Deep peroneal nerve sensation: normal  Sural nerve sensation: absent            Laboratory:  CBC:   Recent Labs   Lab 10/04/20  0456   WBC 10.88   RBC 5.02   HGB 15.1   HCT 46.3      MCV 92   MCH 30.1   MCHC 32.6     CRP: No results for input(s): CRP in the last 168 hours.  ESR: No results for input(s): SEDRATE in the last 168 hours.    Diagnostic Results  10/04 X Ray Foot Left: No obvious evidence of an acute osseous injury involving the left foot.   10/04 US Arteries Bilateral: No hemodynamically significant focal stenosis demonstrated in the right or left lower extremity arterial system. Waveform blunting throughout the lower extremities of uncertain etiology or significance.  Recent noncontrast abdomen pelvis CT 07/31/2020 demonstrated no significant more proximal atherosclerotic calcification to suggest significant in the pelvis or abdominal aorta.  S     Clinical Findings:    Left 5th digit cold, tender, blue-purple. Loss of light touch sensation at discolored portion of digit, light touch intact at level of 5th MTPJ. No open wounds. Pulses nonpalpable. Remainder of foot warm. Other small bluish-purplish hypoxic areas of skin at left 3rd digit and 1st digit.               Assessment/Plan:     Active Diagnoses:    Diagnosis Date Noted POA    PRINCIPAL PROBLEM:  Ischemic toe [I99.8] 10/04/2020 Yes    PAD (peripheral artery disease) [I73.9] 10/04/2020 Yes    Tobacco use [Z72.0] 10/04/2020 Yes    Insufficient social insurance and welfare support [Z59.7] 10/04/2020 Not Applicable      Problems Resolved During this Admission:       Assessment: 51 yo M with Hx of kidney stones, tobacco use, alcohol use with left 5th digit ischemia due to inflow disease. Vitals stable. WBCs WNL. Glucose 111. X ray negative for signs of deep infection, no clinical signs of superficial foot infection. Patient condition is primarily vascular in origin.     Plan:   -No podiatry intervention required at this time. Will  need to followup after revascularization and final demarcation of any devitalized tissues within the left 5th digit.   -Optimization of circulation per interventional cardiology, can be handled outpatient.  -Patient left AMA and will be followed outpatient by interventional cardiology.       Thank you for your consult. I will sign off. Please contact us if you have any additional questions.      Ronnie GRECOM PGY-1  Podiatric Medicine & Surgery  [facility]

## 2020-10-04 NOTE — ASSESSMENT & PLAN NOTE
- concern for ischemia, possible aorto-iliac disease vs embolization  - arterial US without focal stenosis but concern for proximal inflow disease  - xray without fracture  - initiated on ASA, plavix, and statin  - prn norco  - ideally would obtain CTA with runoff and TTE while in house  - patient wishes to AMA due to need to care for mother and need to work to alleviate financial strain  - will refer to outpatient Cardiology and Podiatry

## 2020-10-04 NOTE — SUBJECTIVE & OBJECTIVE
Past Medical History:   Diagnosis Date    Kidney stone        History reviewed. No pertinent surgical history.    Review of patient's allergies indicates:   Allergen Reactions    Pcn [penicillins] Anaphylaxis       No current facility-administered medications on file prior to encounter.      Current Outpatient Medications on File Prior to Encounter   Medication Sig    ondansetron (ZOFRAN) 8 MG tablet Take 1 tablet (8 mg total) by mouth every 8 (eight) hours as needed for Nausea.    tamsulosin (FLOMAX) 0.4 mg Cap Take 1 capsule (0.4 mg total) by mouth once daily.    [DISCONTINUED] HYDROcodone-acetaminophen (NORCO) 7.5-325 mg per tablet Take 1 tablet by mouth every 4 (four) hours as needed for Pain.    [DISCONTINUED] naproxen (NAPROSYN) 500 MG tablet Take 1 tablet (500 mg total) by mouth 2 (two) times daily as needed (pain).     Family History     Problem Relation (Age of Onset)    No Known Problems Sister    Stroke Mother, Father        Tobacco Use    Smoking status: Current Every Day Smoker     Packs/day: 1.00     Types: Cigarettes    Smokeless tobacco: Never Used   Substance and Sexual Activity    Alcohol use: Yes     Comment: occasional    Drug use: No    Sexual activity: Not on file     Review of Systems   Constitutional: Negative for chills, diaphoresis and fever.   HENT: Negative for congestion and sore throat.    Eyes: Negative for discharge and visual disturbance.   Respiratory: Negative for cough and shortness of breath.    Cardiovascular: Negative for chest pain and leg swelling.   Gastrointestinal: Negative for abdominal pain, nausea and vomiting.   Genitourinary: Negative for dysuria and flank pain.   Musculoskeletal: Negative for arthralgias and joint swelling.   Skin: Positive for color change (purple left toe) and wound. Negative for rash.   Allergic/Immunologic: Negative for immunocompromised state.   Neurological: Negative for light-headedness and headaches.   Psychiatric/Behavioral:  Negative for agitation and confusion.     Objective:     Vital Signs (Most Recent):  Temp: 96.3 °F (35.7 °C) (10/04/20 1213)  Pulse: 75 (10/04/20 1213)  Resp: 20 (10/04/20 1213)  BP: (!) 144/87 (10/04/20 1213)  SpO2: 97 % (10/04/20 1213) Vital Signs (24h Range):  Temp:  [96.3 °F (35.7 °C)-97.7 °F (36.5 °C)] 96.3 °F (35.7 °C)  Pulse:  [] 75  Resp:  [16-20] 20  SpO2:  [97 %-98 %] 97 %  BP: (131-169)/(74-93) 144/87     Weight: 89.1 kg (196 lb 6.9 oz)  Body mass index is 26.64 kg/m².    Physical Exam  Vitals signs reviewed.   Constitutional:       General: He is not in acute distress.     Appearance: He is well-developed. He is not diaphoretic.   HENT:      Head: Normocephalic and atraumatic.      Nose: Nose normal.   Eyes:      General: No scleral icterus.     Pupils: Pupils are equal, round, and reactive to light.   Neck:      Musculoskeletal: Normal range of motion.      Vascular: No JVD.      Trachea: No tracheal deviation.   Cardiovascular:      Rate and Rhythm: Normal rate and regular rhythm.      Heart sounds: Normal heart sounds. No murmur. No friction rub. No gallop.    Pulmonary:      Effort: Pulmonary effort is normal. No respiratory distress.      Breath sounds: Normal breath sounds.   Abdominal:      General: There is no distension.      Palpations: Abdomen is soft. There is no mass.      Tenderness: There is no abdominal tenderness.      Hernia: No hernia is present.   Musculoskeletal:         General: No deformity.   Skin:     General: Skin is warm and dry.      Findings: Lesion (left 5th toe purple and tender.) present.   Neurological:      Mental Status: He is alert and oriented to person, place, and time.   Psychiatric:         Behavior: Behavior normal.           CRANIAL NERVES     CN III, IV, VI   Pupils are equal, round, and reactive to light.       Significant Labs: All pertinent labs within the past 24 hours have been reviewed.    Significant Imaging: I have reviewed all pertinent imaging  results/findings within the past 24 hours.

## 2020-10-04 NOTE — PLAN OF CARE
VN cued into room.  Pt resting comfortably in bed with call light and personal belongings within reach.  NADN.  No family at bedside.  Pt complaining of pain, updated patient as to available pain medications and when he can next get them.  No other needs or complaints at this time.  Reminded pt to use call light as needed.  VN will continue to follow and be available as needed.

## 2020-10-04 NOTE — ED TRIAGE NOTES
Patient ambulatory to triage. States he has been having pain to left pinky toe with discoloration since September after he stubbed toe. Xrays were taken which were negative. Ever since then pain has remained along with purple discoloration . Patient states when he is off feet his toe will become a deep purple and he is unable to sleep. Pain not felt with walking. Patient denies any medical history.     Review of patient's allergies indicates:   Allergen Reactions    Pcn [penicillins] Anaphylaxis        Patient has verified the spelling of their name and  on armband.   APPEARANCE: Patient is alert, calm, oriented x 4, and does not appear distressed.  SKIN: Skin is normal for race, warm, and dry. Normal skin turgor and mucous membranes moist. +purple discoloration to left 5th digit +painful    CARDIAC: Normal rate and rhythm, no murmur heard.   RESPIRATORY:Normal rate and effort. Breath sounds clear bilaterally throughout chest. Respirations are equal and unlabored.    GASTRO: Bowel sounds normal, abdomen is soft, no tenderness, and no abdominal distention.  MUSCLE: Full ROM. No bony tenderness or soft tissue tenderness. No obvious deformity.

## 2020-10-04 NOTE — ED NOTES
Patient updated on room assignment. Patient standing next to stretcher. Stated his pain returned. Will offer pain medication before going to floor.

## 2020-10-04 NOTE — HOSPITAL COURSE
Mr. Nicole presents with concern for ischemic toe. Initiated on ASA, plavix, and atorvastatin with plans for CTA with runoff and TTE with bubble to evaluate for ischemic/embolization etiology. Unfortunately, patient has to AMA in order to work and take care of his mother. Given scripts for medications and will set up with f/u appointment as outpatient to better coordinate care in that setting.    BP (!) 144/87 (BP Location: Left arm, Patient Position: Lying)   Pulse 75   Temp 96.3 °F (35.7 °C) (Oral)   Resp 20   Ht 6' (1.829 m)   Wt 89.1 kg (196 lb 6.9 oz)   SpO2 97%   BMI 26.64 kg/m²   Physical Exam  Vitals signs reviewed.   Constitutional:       General: He is not in acute distress.     Appearance: He is well-developed. He is not diaphoretic.   HENT:      Head: Normocephalic and atraumatic.      Nose: Nose normal.   Eyes:      General: No scleral icterus.     Pupils: Pupils are equal, round, and reactive to light.   Neck:      Musculoskeletal: Normal range of motion.      Vascular: No JVD.      Trachea: No tracheal deviation.   Cardiovascular:      Rate and Rhythm: Normal rate and regular rhythm.      Heart sounds: Normal heart sounds. No murmur. No friction rub. No gallop.    Pulmonary:      Effort: Pulmonary effort is normal. No respiratory distress.      Breath sounds: Normal breath sounds.   Abdominal:      General: There is no distension.      Palpations: Abdomen is soft. There is no mass.      Tenderness: There is no abdominal tenderness.      Hernia: No hernia is present.   Musculoskeletal:         General: No deformity.   Skin:     General: Skin is warm and dry.      Findings: Lesion (left 5th toe purple and tender.) present.   Neurological:      Mental Status: He is alert and oriented to person, place, and time.   Psychiatric:         Behavior: Behavior normal.

## 2020-10-04 NOTE — H&P
"[facility] Medicine  History & Physical    Patient Name: Tee Nicole  MRN: [id number]  Admission Date: 10/3/2020  Attending Physician: Ronnie Munguia MD  Primary Care Provider: Primary Doctor No         Patient information was obtained from patient, past medical records and ER records.     Subjective:     Principal Problem:Ischemic toe    Chief Complaint:   Chief Complaint   Patient presents with    Toe Pain     L 5th toe severe pain with purplish discoloration of plantar region of toe. Pt reports pain is constant and throbbing.         HPI: Mr. Nicole is a 49yo man with no known past medical history who presents with left 5th toe pain. He states that he stubbed his toe a few months ago and since that time, he has had pain and some discoloration of the toe. He states he was able to "get by" for a long time, but 4d ago he developed substantial worsening. Toe is purple in color and painful. Pain improves upon standing. Worse with elevation of extremity. He has not been able to sleep 2/2 pain. Not on any meds at home. Does smoke 1ppd. No prior cardiac history. 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    Past Medical History:   Diagnosis Date    Kidney stone        History reviewed. No pertinent surgical history.    Review of patient's allergies indicates:   Allergen Reactions    Pcn [penicillins] Anaphylaxis       No current facility-administered medications on file prior to encounter.      Current Outpatient Medications on File Prior to Encounter   Medication Sig    ondansetron (ZOFRAN) 8 MG tablet Take 1 tablet (8 mg total) by mouth every 8 (eight) hours as needed for Nausea.    tamsulosin (FLOMAX) 0.4 mg Cap Take 1 capsule (0.4 mg total) by mouth once daily.    [DISCONTINUED] HYDROcodone-acetaminophen (NORCO) 7.5-325 mg per tablet Take 1 tablet by mouth every 4 (four) hours as needed for Pain.    [DISCONTINUED] naproxen (NAPROSYN) 500 MG tablet Take 1 " tablet (500 mg total) by mouth 2 (two) times daily as needed (pain).     Family History     Problem Relation (Age of Onset)    No Known Problems Sister    Stroke Mother, Father        Tobacco Use    Smoking status: Current Every Day Smoker     Packs/day: 1.00     Types: Cigarettes    Smokeless tobacco: Never Used   Substance and Sexual Activity    Alcohol use: Yes     Comment: occasional    Drug use: No    Sexual activity: Not on file     Review of Systems   Constitutional: Negative for chills, diaphoresis and fever.   HENT: Negative for congestion and sore throat.    Eyes: Negative for discharge and visual disturbance.   Respiratory: Negative for cough and shortness of breath.    Cardiovascular: Negative for chest pain and leg swelling.   Gastrointestinal: Negative for abdominal pain, nausea and vomiting.   Genitourinary: Negative for dysuria and flank pain.   Musculoskeletal: Negative for arthralgias and joint swelling.   Skin: Positive for color change (purple left toe) and wound. Negative for rash.   Allergic/Immunologic: Negative for immunocompromised state.   Neurological: Negative for light-headedness and headaches.   Psychiatric/Behavioral: Negative for agitation and confusion.     Objective:     Vital Signs (Most Recent):  Temp: 96.3 °F (35.7 °C) (10/04/20 1213)  Pulse: 75 (10/04/20 1213)  Resp: 20 (10/04/20 1213)  BP: (!) 144/87 (10/04/20 1213)  SpO2: 97 % (10/04/20 1213) Vital Signs (24h Range):  Temp:  [96.3 °F (35.7 °C)-97.7 °F (36.5 °C)] 96.3 °F (35.7 °C)  Pulse:  [] 75  Resp:  [16-20] 20  SpO2:  [97 %-98 %] 97 %  BP: (131-169)/(74-93) 144/87     Weight: 89.1 kg (196 lb 6.9 oz)  Body mass index is 26.64 kg/m².    Physical Exam  Vitals signs reviewed.   Constitutional:       General: He is not in acute distress.     Appearance: He is well-developed. He is not diaphoretic.   HENT:      Head: Normocephalic and atraumatic.      Nose: Nose normal.   Eyes:      General: No scleral icterus.      Pupils: Pupils are equal, round, and reactive to light.   Neck:      Musculoskeletal: Normal range of motion.      Vascular: No JVD.      Trachea: No tracheal deviation.   Cardiovascular:      Rate and Rhythm: Normal rate and regular rhythm.      Heart sounds: Normal heart sounds. No murmur. No friction rub. No gallop.    Pulmonary:      Effort: Pulmonary effort is normal. No respiratory distress.      Breath sounds: Normal breath sounds.   Abdominal:      General: There is no distension.      Palpations: Abdomen is soft. There is no mass.      Tenderness: There is no abdominal tenderness.      Hernia: No hernia is present.   Musculoskeletal:         General: No deformity.   Skin:     General: Skin is warm and dry.      Findings: Lesion (left 5th toe purple and tender.) present.   Neurological:      Mental Status: He is alert and oriented to person, place, and time.   Psychiatric:         Behavior: Behavior normal.           CRANIAL NERVES     CN III, IV, VI   Pupils are equal, round, and reactive to light.       Significant Labs: All pertinent labs within the past 24 hours have been reviewed.    Significant Imaging: I have reviewed all pertinent imaging results/findings within the past 24 hours.    Assessment/Plan:     * Ischemic toe  - concern for ischemia, possible aorto-iliac disease vs embolization  - arterial US without focal stenosis but concern for proximal inflow disease  - xray without fracture  - initiated on ASA, plavix, and statin  - prn norco  - ideally would obtain CTA with runoff and TTE while in house  - patient wishes to AMA due to need to care for mother and need to work to alleviate financial strain  - will refer to outpatient Cardiology and Podiatry    Insufficient social insurance and welfare support  - AMA due to inadequate financial support  - ambulatory CM referral      Tobacco use  - advise on cessation      PAD (peripheral artery disease)  - initiate on ASA, statin, plavix        VTE Risk  Mitigation (From admission, onward)         Ordered     enoxaparin injection 80 mg  Every 12 hours (non-standard times)      10/04/20 1243                   Ronnie Munguia MD  Department of Hospital Medicine   [facility]

## 2020-10-04 NOTE — DISCHARGE SUMMARY
"[facility] Medicine  Discharge Summary      Patient Name: Tee Nicole  MRN: [id number]  Admission Date: 10/3/2020  Hospital Length of Stay: 1 days  Discharge Date and Time:  10/04/2020 3:22 PM  Attending Physician: No att. providers found   Discharging Provider: Ronnie Munguia MD  Primary Care Provider: Primary Doctor No      HPI:   Mr. Nicole is a 51yo man with no known past medical history who presents with left 5th toe pain. He states that he stubbed his toe a few months ago and since that time, he has had pain and some discoloration of the toe. He states he was able to "get by" for a long time, but 4d ago he developed substantial worsening. Toe is purple in color and painful. Pain improves upon standing. Worse with elevation of extremity. He has not been able to sleep 2/2 pain. Not on any meds at home. Does smoke 1ppd. No prior cardiac history. 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    * No surgery found *      Hospital Course:   Mr. Nicole presents with concern for ischemic toe. Initiated on ASA, plavix, and atorvastatin with plans for CTA with runoff and TTE with bubble to evaluate for ischemic/embolization etiology. Unfortunately, patient has to AMA in order to work and take care of his mother. Given scripts for medications and will set up with f/u appointment as outpatient to better coordinate care in that setting.    BP (!) 144/87 (BP Location: Left arm, Patient Position: Lying)   Pulse 75   Temp 96.3 °F (35.7 °C) (Oral)   Resp 20   Ht 6' (1.829 m)   Wt 89.1 kg (196 lb 6.9 oz)   SpO2 97%   BMI 26.64 kg/m²   Physical Exam  Vitals signs reviewed.   Constitutional:       General: He is not in acute distress.     Appearance: He is well-developed. He is not diaphoretic.   HENT:      Head: Normocephalic and atraumatic.      Nose: Nose normal.   Eyes:      General: No scleral icterus.     Pupils: Pupils are equal, round, and reactive to light. "   Neck:      Musculoskeletal: Normal range of motion.      Vascular: No JVD.      Trachea: No tracheal deviation.   Cardiovascular:      Rate and Rhythm: Normal rate and regular rhythm.      Heart sounds: Normal heart sounds. No murmur. No friction rub. No gallop.    Pulmonary:      Effort: Pulmonary effort is normal. No respiratory distress.      Breath sounds: Normal breath sounds.   Abdominal:      General: There is no distension.      Palpations: Abdomen is soft. There is no mass.      Tenderness: There is no abdominal tenderness.      Hernia: No hernia is present.   Musculoskeletal:         General: No deformity.   Skin:     General: Skin is warm and dry.      Findings: Lesion (left 5th toe purple and tender.) present.   Neurological:      Mental Status: He is alert and oriented to person, place, and time.   Psychiatric:         Behavior: Behavior normal.      Consults:   Consults (From admission, onward)        Status Ordering Provider     Inpatient consult to Cardiology  Once     Provider:  Sergo Wing MD    Acknowledged MANDEEP LUCAS     Inpatient consult to Podiatry  Once     Provider:  (Not yet assigned)    Acknowledged JLUIS HINDS     IP consult to case management  Once     Provider:  (Not yet assigned)    Acknowledged BENIGNO MARTINEZ          * Ischemic toe  - concern for ischemia, possible aorto-iliac disease vs embolization  - arterial US without focal stenosis but concern for proximal inflow disease  - xray without fracture  - initiated on ASA, plavix, and statin  - prn norco  - ideally would obtain CTA with runoff and TTE while in house  - patient wishes to AMA due to need to care for mother and need to work to alleviate financial strain  - will refer to outpatient Cardiology and Podiatry    Insufficient social insurance and welfare support  - AMA due to inadequate financial support  - ambulatory CM referral      Tobacco use  - advise on cessation      PAD (peripheral artery  disease)  - initiate on ASA, statin, plavix        Final Active Diagnoses:    Diagnosis Date Noted POA    PRINCIPAL PROBLEM:  Ischemic toe [I99.8] 10/04/2020 Yes    PAD (peripheral artery disease) [I73.9] 10/04/2020 Yes    Tobacco use [Z72.0] 10/04/2020 Yes    Insufficient social insurance and welfare support [Z59.7] 10/04/2020 Not Applicable      Problems Resolved During this Admission:       Discharged Condition: against medical advice    Disposition: Left Against Medical Adv*    Follow Up:  Follow-up Information     Please follow up.    Why: Ambulatory referrals were placed with Podiatry & Cardiology. Their offices will contact patient to arrange an appointment.               Patient Instructions:      Ambulatory referral/consult to Cardiology   Standing Status: Future   Referral Priority: Urgent Referral Type: Consultation   Referral Reason: Specialty Services Required   Requested Specialty: Cardiology   Number of Visits Requested: 1     Ambulatory referral/consult to Podiatry   Standing Status: Future   Referral Priority: Routine Referral Type: Consultation   Referral Reason: Specialty Services Required   Requested Specialty: Podiatry   Number of Visits Requested: 1     Ambulatory referral/consult to Outpatient Case Management   Referral Priority: Routine Referral Type: Consultation   Referral Reason: Specialty Services Required   Number of Visits Requested: 1     Diet Cardiac     Notify your health care provider if you experience any of the following:  temperature >100.4     Notify your health care provider if you experience any of the following:  severe uncontrolled pain     Notify your health care provider if you experience any of the following:  redness, tenderness, or signs of infection (pain, swelling, redness, odor or green/yellow discharge around incision site)       Significant Diagnostic Studies: Labs:   CMP   Recent Labs   Lab 10/04/20  0050 10/04/20  0456    139   K 4.2 4.5    105    CO2 22* 19*   * 111*   BUN 12 13   CREATININE 1.0 1.0   CALCIUM 8.5* 8.6*   PROT 6.7  --    ALBUMIN 3.6  --    BILITOT 0.2  --    ALKPHOS 93  --    AST 21  --    ALT 24  --    ANIONGAP 11 15   ESTGFRAFRICA >60 >60   EGFRNONAA >60 >60   , CBC   Recent Labs   Lab 10/04/20  0050 10/04/20  0456   WBC 11.54 10.88   HGB 14.4 15.1   HCT 42.5 46.3    230   , INR   Lab Results   Component Value Date    INR 1.0 10/04/2020    INR 1.0 09/02/2016    INR 1.0 11/06/2013   , Lipid Panel No results found for: CHOL, HDL, LDLCALC, TRIG, CHOLHDL, Troponin No results for input(s): TROPONINI in the last 168 hours., A1C:   Recent Labs   Lab 10/04/20  0243   HGBA1C 5.6    and All labs within the past 24 hours have been reviewed    Pending Diagnostic Studies:     Procedure Component Value Units Date/Time    CTA Runoff ABD PEL Bilat Lower Ext [[id number]]     Order Status: Sent Lab Status: No result     Echo Color Flow Doppler? Yes; Bubble Contrast? Yes [[id number]]     Order Status: Sent Lab Status: No result          Medications:  Reconciled Home Medications:      Medication List      START taking these medications    aspirin 81 MG Chew  Take 1 tablet (81 mg total) by mouth once daily.  Start taking on: October 5, 2020     atorvastatin 80 MG tablet  Commonly known as: LIPITOR  Take 1 tablet (80 mg total) by mouth every evening.     clopidogreL 75 mg tablet  Commonly known as: PLAVIX  Take 1 tablet (75 mg total) by mouth once daily.  Start taking on: October 5, 2020        CONTINUE taking these medications    HYDROcodone-acetaminophen 7.5-325 mg per tablet  Commonly known as: NORCO  Take 1 tablet by mouth every 4 (four) hours as needed for Pain.     ondansetron 8 MG tablet  Commonly known as: ZOFRAN  Take 1 tablet (8 mg total) by mouth every 8 (eight) hours as needed for Nausea.     tamsulosin 0.4 mg Cap  Commonly known as: FLOMAX  Take 1 capsule (0.4 mg total) by mouth once daily.        STOP taking these medications     naproxen 500 MG tablet  Commonly known as: NAPROSYN            Indwelling Lines/Drains at time of discharge:   Lines/Drains/Airways     None                 Time spent on the discharge of patient: 65 minutes  Patient was seen and examined on the date of discharge and determined to be suitable for discharge.         Ronnie Munguia MD  Department of Hospital Medicine  [facility]

## 2020-10-04 NOTE — ED PROVIDER NOTES
Encounter Date: 10/3/2020    SCRIBE #1 NOTE: I, Amie Yang, am scribing for, and in the presence of,  Lulu Prescott MD. I have scribed the entire note.       History     Chief Complaint   Patient presents with    Toe Pain     L 5th toe severe pain with purplish discoloration of plantar region of toe. Pt reports pain is constant and throbbing.      Time seen by provider: 12:30 AM    This is a 50 y.o. male who presents with complaint of L 5th toe pain and purple discoloration since Wednesday (09/30). He states that the pain began a few months ago and worsened Wednesday. Standing relieves the pain. The patient thought he stubbed and broke it. He reports that he presented to the ED for an x-ray on 09/18. The patient denies fever or any other concerning symptoms. He further denies any major medical problems which would require daily medication. The patient states that he has never had these symptoms in the past. He is a smoker, who drinks occasionally. His PCP is Dr. Ilsa Shi. He reports that he may be allergic to penicillin.      The history is provided by the patient.     Review of patient's allergies indicates:   Allergen Reactions    Pcn [penicillins] Anaphylaxis     Past Medical History:   Diagnosis Date    Kidney stone      No past surgical history on file.  Family History   Problem Relation Age of Onset    Stroke Mother     Stroke Father     No Known Problems Sister      Social History     Tobacco Use    Smoking status: Current Every Day Smoker     Packs/day: 1.00     Types: Cigarettes   Substance Use Topics    Alcohol use: Yes     Comment: occasional    Drug use: No     Review of Systems   Constitutional: Negative for chills and fever.   HENT: Negative for sore throat.    Eyes: Negative for redness.   Respiratory: Negative for shortness of breath.    Cardiovascular: Negative for chest pain.   Gastrointestinal: Negative for abdominal pain, diarrhea, nausea and vomiting.   Genitourinary: Negative  for dysuria and hematuria.   Musculoskeletal: Negative for back pain.        L 5th toe   Skin: Positive for color change (L 5th toe). Negative for rash.   Neurological: Negative for headaches.       Physical Exam     Initial Vitals [10/03/20 2318]   BP Pulse Resp Temp SpO2   (!) 169/85 109 20 97.3 °F (36.3 °C) 98 %      MAP       --         Physical Exam    Nursing note and vitals reviewed.  Constitutional: He appears well-developed and well-nourished. He is not diaphoretic. No distress.   HENT:   Head: Normocephalic and atraumatic.   Mouth/Throat: Oropharynx is clear and moist.   Eyes: Conjunctivae and EOM are normal.   Neck: Normal range of motion. Neck supple.   Cardiovascular: Normal rate, regular rhythm and normal heart sounds. Exam reveals no gallop and no friction rub.    No murmur heard.  Pulmonary/Chest: Breath sounds normal. He has no wheezes. He has no rhonchi. He has no rales.   Abdominal: Soft. There is no abdominal tenderness. There is no rebound and no guarding.   Musculoskeletal: Normal range of motion. No tenderness or edema.   Lymphadenopathy:     He has no cervical adenopathy.   Neurological: He is alert and oriented to person, place, and time. He has normal strength.   Skin: Skin is warm and dry. No rash noted.   RLE: mottled skin to distal anterior lower leg  L little toe is cyanotic and cool                     ED Course   Procedures  Labs Reviewed   COMPREHENSIVE METABOLIC PANEL - Abnormal; Notable for the following components:       Result Value    CO2 22 (*)     Glucose 124 (*)     Calcium 8.5 (*)     All other components within normal limits   CBC W/ AUTO DIFFERENTIAL   PROTIME-INR   SARS-COV-2 RNA AMPLIFICATION, QUAL   BASIC METABOLIC PANEL   MAGNESIUM   CBC W/ AUTO DIFFERENTIAL   HEMOGLOBIN A1C            X-Rays:   Independently Interpreted Readings:   Other Readings:  Reviewed by myself, read by radiology.     Imaging Results          US Lower Extremity Arteries Bilateral (Final result)   Result time 10/04/20 01:51:29    Final result by Liliana Bauer MD (10/04/20 01:51:29)                 Impression:      No hemodynamically significant focal stenosis demonstrated in the right or left lower extremity arterial system.    Waveform blunting throughout the lower extremities of uncertain etiology or significance.  Recent noncontrast abdomen pelvis CT 07/31/2020 demonstrated no significant more proximal atherosclerotic calcification to suggest significant in the pelvis or abdominal aorta.  S      Electronically signed by: Liliana Bauer  Date:    10/04/2020  Time:    01:51             Narrative:    EXAMINATION:  US LOWER EXTREMITY ARTERIES BILATERAL    CLINICAL HISTORY:  Other disorder of circulatory system    TECHNIQUE:  Doppler evaluation of the lower extremity arteries was performed with color and pulsed Doppler.    COMPARISON:  CT abdomen pelvis without contrast 07/31/2020    FINDINGS:  The ankle brachial index on the right is  and on the left is .    The peak systolic velocities on the right are as follows, in centimeters/second:    Common femoral artery: 75.3, profundus femoral 36    Superficial femoral artery, proximal: 76.2    Superficial femoral artery, mid portion: 55    Superficial femoral artery, distal: 57    Popliteal artery: 46.6 approximately 40.6 distally    Posterior tibial artery: 41.7    Anterior tibial artery: 29.5 9    Arteries are patent with no focal stenoses.  Waveforms are monophasic raising question of possible inflow disease more proximally.    The peak systolic velocities on the left are as follows, in centimeters/second:    Common femoral artery: 84, profundus femoral 50.5    Superficial femoral artery, proximal: 52.7    Superficial femoral artery, mid portion: 46.6    Superficial femoral artery, distal: 42    Popliteal artery: 52    Posterior tibial artery: 42    Anterior tibial artery: 31    No evidence of focal stenosis.  Monophasic waveforms are noted raising  question of inflow disease more proximally.                              Medical Decision Making:   History:   Old Medical Records: I decided to obtain old medical records.  Old Records Summarized: records from previous admission(s).       <> Summary of Records: 09/18 - PT presented to [facility] ED for L 5th toe pain  Clinical Tests:   Lab Tests: Reviewed and Ordered  Radiological Study: Reviewed and Ordered                   ED Course as of Oct 04 0220   Sun Oct 04, 2020   0216 Case discussed with Dr. Wing. He recommends plavix, ASA, lovenox and admit to medicine service.    [ST]   0220 Case discussed with [facility] service for admission.    [ST]      ED Course User Index  [ST] Lulu Prescott MD            Clinical Impression:       ICD-10-CM ICD-9-CM   1. PVD (peripheral vascular disease)  I73.9 443.9   2. Ischemic toe  I99.8 459.9   3. Chest pain  R07.9 786.50                          ED Disposition Condition    Admit              I, Lulu Prescott, personally performed the services described in this documentation. All medical record entries made by the scribe were at my direction and in my presence.  I have reviewed the chart and agree that the record reflects my personal performance and is accurate and complete. Lulu Prescott M.D. 2:20 AM10/04/2020                 Lulu Prescott MD  10/04/20 0220

## 2020-10-13 ENCOUNTER — OFFICE VISIT (OUTPATIENT)
Dept: URGENT CARE | Facility: CLINIC | Age: 50
End: 2020-10-13
Payer: MEDICAID

## 2020-10-13 VITALS
BODY MASS INDEX: 26.55 KG/M2 | HEIGHT: 72 IN | OXYGEN SATURATION: 97 % | DIASTOLIC BLOOD PRESSURE: 82 MMHG | WEIGHT: 196 LBS | SYSTOLIC BLOOD PRESSURE: 131 MMHG | TEMPERATURE: 97 F | RESPIRATION RATE: 15 BRPM | HEART RATE: 100 BPM

## 2020-10-13 DIAGNOSIS — I99.8 ISCHEMIC TOE: Primary | ICD-10-CM

## 2020-10-13 DIAGNOSIS — Z91.89 HAS DIFFICULTY ACCESSING PRIMARY CARE PROVIDER FOR MOST VISITS: ICD-10-CM

## 2020-10-13 PROCEDURE — 99214 OFFICE O/P EST MOD 30 MIN: CPT | Mod: S$GLB,,, | Performed by: NURSE PRACTITIONER

## 2020-10-13 PROCEDURE — 99214 PR OFFICE/OUTPT VISIT, EST, LEVL IV, 30-39 MIN: ICD-10-PCS | Mod: S$GLB,,, | Performed by: NURSE PRACTITIONER

## 2020-10-13 RX ORDER — HYDROCODONE BITARTRATE AND ACETAMINOPHEN 7.5; 325 MG/1; MG/1
1 TABLET ORAL EVERY 6 HOURS PRN
Qty: 8 TABLET | Refills: 0 | Status: SHIPPED | OUTPATIENT
Start: 2020-10-13 | End: 2020-10-15 | Stop reason: SDUPTHER

## 2020-10-13 NOTE — PROGRESS NOTES
Subjective:       Patient ID: Tee Nicole is a 50 y.o. male.    Vitals:  height is 6' (1.829 m) and weight is 88.9 kg (196 lb). His temperature is 97 °F (36.1 °C). His blood pressure is 131/82 and his pulse is 100. His respiration is 15 and oxygen saturation is 97%.     Chief Complaint: Foot Pain    Patient presents today complaining of left foot pain after stubbing toe on the table a couple months ago and then re-injuring it a couple of weeks ago. He was recently seen in the ED for this same complaint.  He was set up with a podiatry evaluation which is scheduled for 10/15/2020. He reports that the pain is aggravated by footwear and lying down. He reports that it feels better when he is standing it feels much better. He is unable to sleep due to the pain. He reports that the hydrocodone given in the ED provides him with relief. He has been taking ASA, plavix as well.       Constitution: Negative for chills, fatigue and fever.   HENT: Negative for congestion and sore throat.    Neck: Negative for painful lymph nodes.   Cardiovascular: Negative for chest pain and leg swelling.   Eyes: Negative for double vision and blurred vision.   Respiratory: Negative for cough and shortness of breath.    Gastrointestinal: Negative for nausea, vomiting and diarrhea.   Genitourinary: Negative for dysuria, frequency and urgency.   Musculoskeletal: Positive for pain (left 5th toe) and trauma (left 5th toe). Negative for joint pain, joint swelling, pain with walking, muscle cramps and muscle ache.   Skin: Positive for erythema (left 5th toe) and bruising (left 5th toe). Negative for color change, pale and rash.   Allergic/Immunologic: Negative for seasonal allergies.   Neurological: Negative for dizziness, history of vertigo, light-headedness, passing out and headaches.   Hematologic/Lymphatic: Negative for swollen lymph nodes, easy bruising/bleeding and history of blood clots. Does not bruise/bleed easily.    Psychiatric/Behavioral: Negative for nervous/anxious, sleep disturbance and depression. The patient is not nervous/anxious.        Objective:      Physical Exam   Constitutional: He is oriented to person, place, and time.   HENT:   Head: Normocephalic and atraumatic.   Pulmonary/Chest: Effort normal and breath sounds normal.   Abdominal: Normal appearance.   Musculoskeletal:      Left foot: Normal range of motion and normal capillary refill. Tenderness (left 5th toe) present. No bony tenderness, swelling, deformity or laceration.   Neurological: He is alert and oriented to person, place, and time.   Skin: Capillary refill takes less than 2 seconds. not left footLesions:  erythema (left 5th toe)Psychiatric: His behavior is normal. Mood, judgment and thought content normal.   Vitals reviewed.        Assessment:       1. Ischemic toe    2. Has difficulty accessing primary care provider for most visits        Plan:         Ischemic toe  -     HYDROcodone-acetaminophen (NORCO) 7.5-325 mg per tablet; Take 1 tablet by mouth every 6 (six) hours as needed for Pain.  Dispense: 8 tablet; Refill: 0  -     [place]  database queried/reviewed  -     Will provide limited supply until patient can be seen by podiatry on 10/15/2020. Patient aware no further refills will be provided.   -     Counseled that if the discoloration spreads, and worsening pain, to seek care at ED and make arrangements for possibly being admitted    Has difficulty accessing primary care provider for most visits  -     Ambulatory referral/consult to Family Practice  -     Needs to establish care with PCP for preventive care and med management

## 2020-10-14 ENCOUNTER — TELEPHONE (OUTPATIENT)
Dept: FAMILY MEDICINE | Facility: CLINIC | Age: 50
End: 2020-10-14

## 2020-10-14 NOTE — TELEPHONE ENCOUNTER
----- Message from Luis Rebollar III, MD sent at 10/14/2020  8:56 AM CDT -----  Regarding: FW: Follow Up    ----- Message -----  From: Sergo Wing MD  Sent: 10/13/2020   7:27 AM CDT  To: Luis Rebollar III, MD  Subject: RE: Follow Up                                    Marce Rebollar       You can bring this patient in your office       He will see Dr. Peguero in podiatry         Thanks        RACHEL  ----- Message -----  From: Ronnie Munguia MD  Sent: 10/6/2020   4:54 PM CDT  To: Sergo Wing MD  Subject: Follow Up                                        Sergo,    This is the gentleman with the ischemic left 5th toe who had to sign out AMA over the weekend to take care of family matters. I had sent a message to my  to get him scheduled with you as an outpatient ASAP. He has an appointment for 11/4.    Is it possible to expedite this in any way? If we reschedule his appointment, could we please have the clinic staff notify him at [phone]? Thank you.    Jose A

## 2020-10-15 ENCOUNTER — OFFICE VISIT (OUTPATIENT)
Dept: PODIATRY | Facility: CLINIC | Age: 50
End: 2020-10-15
Payer: MEDICAID

## 2020-10-15 VITALS
BODY MASS INDEX: 27.09 KG/M2 | WEIGHT: 200 LBS | SYSTOLIC BLOOD PRESSURE: 124 MMHG | DIASTOLIC BLOOD PRESSURE: 82 MMHG | HEIGHT: 72 IN

## 2020-10-15 DIAGNOSIS — Z72.0 TOBACCO USE: Primary | ICD-10-CM

## 2020-10-15 DIAGNOSIS — I73.9 PVD (PERIPHERAL VASCULAR DISEASE): ICD-10-CM

## 2020-10-15 DIAGNOSIS — I96 GANGRENOUS TOE: ICD-10-CM

## 2020-10-15 DIAGNOSIS — I99.8 ISCHEMIC TOE: ICD-10-CM

## 2020-10-15 DIAGNOSIS — I73.9 PAD (PERIPHERAL ARTERY DISEASE): ICD-10-CM

## 2020-10-15 PROCEDURE — 99213 OFFICE O/P EST LOW 20 MIN: CPT | Mod: PBBFAC,PN | Performed by: PODIATRIST

## 2020-10-15 PROCEDURE — 99999 PR PBB SHADOW E&M-EST. PATIENT-LVL III: ICD-10-PCS | Mod: PBBFAC,,, | Performed by: PODIATRIST

## 2020-10-15 PROCEDURE — 99214 OFFICE O/P EST MOD 30 MIN: CPT | Mod: S$PBB,,, | Performed by: PODIATRIST

## 2020-10-15 PROCEDURE — 99214 PR OFFICE/OUTPT VISIT, EST, LEVL IV, 30-39 MIN: ICD-10-PCS | Mod: S$PBB,,, | Performed by: PODIATRIST

## 2020-10-15 PROCEDURE — 99999 PR PBB SHADOW E&M-EST. PATIENT-LVL III: CPT | Mod: PBBFAC,,, | Performed by: PODIATRIST

## 2020-10-15 RX ORDER — HYDROCODONE BITARTRATE AND ACETAMINOPHEN 7.5; 325 MG/1; MG/1
1 TABLET ORAL EVERY 12 HOURS PRN
Qty: 15 TABLET | Refills: 0 | Status: ON HOLD | OUTPATIENT
Start: 2020-10-15 | End: 2020-12-07 | Stop reason: HOSPADM

## 2020-10-15 NOTE — TELEPHONE ENCOUNTER
----- Message from Daya Johnson sent at 10/15/2020 11:28 AM CDT -----  Type:  RX Refill Request    Who Called: PT  Refill or New Rx: Refill  RX Name and Strength: HYDROcodone-acetaminophen (NORCO) 7.5-325 mg per tablet  Is this a 30 day or 90 day RX: 30 Day  Preferred Pharmacy with phone number: [facility]  Would the patient rather a call back or a response via [facility]? Call back  Best Call Back Number:   Additional Information: n/a

## 2020-10-15 NOTE — LETTER
October 15, 2020      Ronnie Munguia MD  [address]  [facility] [address]           [facility]  [address], [address]  [place] [address]  Phone: [phone]  Fax: [phone]          Patient: Tee Nicole   MR Number: [id number]   YOB: 1970   Date of Visit: 10/15/2020       Dear Dr. Ronnie Munguia:    Thank you for referring Tee Nicole to me for evaluation. Attached you will find relevant portions of my assessment and plan of care.    If you have questions, please do not hesitate to call me. I look forward to following Tee Nicole along with you.    Sincerely,    Anita Peguero DPM    Enclosure  CC:  No Recipients    If you would like to receive this communication electronically, please contact [email or web address] or [phone] to request more information on [facility] [employer] access.    For providers and/or their staff who would like to refer a patient to [facility], please contact us through our one-stop-shop provider referral line, [facility], at [phone].    If you feel you have received this communication in error or would no longer like to receive these types of communications, please e-mail [email or web address]

## 2020-10-15 NOTE — PROGRESS NOTES
Subjective:      Patient ID: Tee Nicole is a 50 y.o. male.    Chief Complaint: Toe Pain (left foot 5th toe, pain subsides when pressure is applied ) and PCP Visit (does not have PCP, referral from Dr. Munguia )      50 y.o. male presenting with left foot 5th toe discoloration and pain. Was evaluated in urgent care/ED in the past after having injury to left 5th toe. Was recently admitted for PAD/5th toe discoloration but left AMA due to family issue. Ambulating in tennis shoe. Complains of ischemic pain L 5th toe. With dusky, discoloration of tip of 5th toe. Very sensitive to touch and painful. He was being worked up for PAD. Pending Herminie evaluation on 10/22 with Dr. Rebollar. History of smoking 1 pack a day for 20 years.     Recent Adup: No hemodynamically significant focal stenosis demonstrated in the right or left lower extremity arterial system. Waveform blunting throughout the lower extremities of uncertain etiology or significance.  Recent noncontrast abdomen pelvis CT 07/31/2020 demonstrated no significant more proximal atherosclerotic calcification to suggest significant in the pelvis or abdominal aorta.       Review of Systems   Constitution: Negative for chills, decreased appetite, fever and malaise/fatigue.   HENT: Negative for congestion, ear discharge and sore throat.    Eyes: Negative for discharge and pain.   Cardiovascular: Negative for chest pain, claudication and leg swelling.   Respiratory: Negative for cough and shortness of breath.    Skin: Negative for color change, nail changes and rash.   Musculoskeletal: Positive for stiffness. Negative for arthritis, joint pain, joint swelling and muscle weakness.        L 5th toe pain    Gastrointestinal: Negative for bloating, abdominal pain, diarrhea, nausea and vomiting.   Genitourinary: Negative for flank pain and hematuria.   Neurological: Negative for headaches, numbness and weakness.   Psychiatric/Behavioral: Negative for altered mental  status.             Past Medical History:   Diagnosis Date    Kidney stone        No past surgical history on file.    Family History   Problem Relation Age of Onset    Stroke Mother     Stroke Father     No Known Problems Sister        Social History     Socioeconomic History    Marital status: Single     Spouse name: Not on file    Number of children: Not on file    Years of education: Not on file    Highest education level: Not on file   Occupational History    Not on file   Social Needs    Financial resource strain: Not on file    Food insecurity     Worry: Not on file     Inability: Not on file    Transportation needs     Medical: Not on file     Non-medical: Not on file   Tobacco Use    Smoking status: Former Smoker     Packs/day: 1.00     Types: Cigarettes    Smokeless tobacco: Never Used   Substance and Sexual Activity    Alcohol use: Yes     Comment: occasional    Drug use: No    Sexual activity: Not Currently   Lifestyle    Physical activity     Days per week: Not on file     Minutes per session: Not on file    Stress: Not on file   Relationships    Social connections     Talks on phone: Not on file     Gets together: Not on file     Attends [employer] service: Not on file     Active member of club or organization: Not on file     Attends meetings of clubs or organizations: Not on file     Relationship status: Not on file   Other Topics Concern    Not on file   Social History Narrative    Not on file       Current Outpatient Medications   Medication Sig Dispense Refill    aspirin 81 MG Chew Take 1 tablet (81 mg total) by mouth once daily. 30 tablet 11    atorvastatin (LIPITOR) 80 MG tablet Take 1 tablet (80 mg total) by mouth every evening. 90 tablet 3    clopidogreL (PLAVIX) 75 mg tablet Take 1 tablet (75 mg total) by mouth once daily. 30 tablet 11    HYDROcodone-acetaminophen (NORCO) 7.5-325 mg per tablet Take 1 tablet by mouth every 6 (six) hours as needed for Pain. 8 tablet 0     ondansetron (ZOFRAN) 8 MG tablet Take 1 tablet (8 mg total) by mouth every 8 (eight) hours as needed for Nausea. 15 tablet 0    tamsulosin (FLOMAX) 0.4 mg Cap Take 1 capsule (0.4 mg total) by mouth once daily. 10 capsule 0     No current facility-administered medications for this visit.        Review of patient's allergies indicates:   Allergen Reactions    Pcn [penicillins] Anaphylaxis       Vitals:    10/15/20 1011   BP: 124/82   Weight: 90.7 kg (200 lb)   Height: 6' (1.829 m)   PainSc:   3       Objective:      Physical Exam  Constitutional:       General: He is not in acute distress.     Appearance: He is well-developed.   HENT:      Nose: Nose normal.   Eyes:      Conjunctiva/sclera: Conjunctivae normal.   Neck:      Musculoskeletal: Normal range of motion.   Pulmonary:      Effort: Pulmonary effort is normal.   Chest:      Chest wall: No tenderness.   Abdominal:      Tenderness: There is no abdominal tenderness.   Neurological:      Mental Status: He is alert and oriented to person, place, and time.   Psychiatric:         Behavior: Behavior normal.         Vascular: Distal DP/PT pulses palpable 1/4 and 0/4 PT. No vericosities noted to LEs. warm to touch LE and cool to touch to left 5th toe, edema noted to L 5th toe.    Dermatologic:   L foot: dusky changes to 5th toe distally. No open lesion. No maceration. Mild rubor but no erythema, no drainage.     Musculoskeletal: No calf tenderness LE, Compartments soft/compressible.   L foot: pain distal 5th toe.     Neurological: Light touch, proprioception, and sharp/dull sensation are all intact. Protective threshold with the [place]-Wienstein monofilament is intact. Vibratory sensation intact.           Assessment:       Encounter Diagnoses   Name Primary?    PVD (peripheral vascular disease)     Tobacco use Yes    PAD (peripheral artery disease)     Gangrenous toe          Plan:       Tee was seen today for toe pain and pcp visit.    Diagnoses and all  orders for this visit:    Tobacco use    PVD (peripheral vascular disease)  -     Ambulatory referral/consult to Podiatry    PAD (peripheral artery disease)    Gangrenous toe      I counseled the patient on his conditions, their implications and medical management.    50 y.o. male with L foot 5th toe early gangrenous/dusky changes.     -L foot x-ray (previous) reviewed.  No bony abnormality noted.   -no open ulcer noted.  Early stage of gangrenous changes of the 5th.  Patient is pending evaluation with Dr. Rebollar on October 22nd.   -we discussed possible losing 5th toe if it turns infection/completel gangrene.     -The nature of the condition, options for management, as well as potential risks and complications were discussed in detail with patient. Patient was amenable to my recommendations and left my office fully informed and will follow up as instructed or sooner if necessary.    -Patient was advised of signs and symptoms of infection including redness, drainage, purulence, odor, streaking, fever, chills and I advised patient to seek medical attention (ER or urgent care) if these symptoms arise.   -Discuss in detail the harmful effects of nicotine/tobacco/cigarette smoking, especially in relation to the lower extremity. Recommend consultation with primary care provider for further discussed of smoking cessation methods. Smoking & Tobacco use cessation couseling was rendered at today's visit; intermediate, bewteen 3 and 10 minutes.  -f/u 2 weeks    Note dictated with voice recognition software, please excuse any grammatical errors.

## 2020-10-22 ENCOUNTER — OFFICE VISIT (OUTPATIENT)
Dept: CARDIOLOGY | Facility: CLINIC | Age: 50
End: 2020-10-22
Payer: MEDICAID

## 2020-10-22 VITALS
BODY MASS INDEX: 27.47 KG/M2 | HEART RATE: 87 BPM | OXYGEN SATURATION: 98 % | DIASTOLIC BLOOD PRESSURE: 84 MMHG | SYSTOLIC BLOOD PRESSURE: 114 MMHG | HEIGHT: 72 IN | WEIGHT: 202.81 LBS

## 2020-10-22 DIAGNOSIS — I99.8 ISCHEMIC TOE: ICD-10-CM

## 2020-10-22 DIAGNOSIS — I73.9 PVD (PERIPHERAL VASCULAR DISEASE): ICD-10-CM

## 2020-10-22 DIAGNOSIS — Z01.818 PRE-OP TESTING: ICD-10-CM

## 2020-10-22 DIAGNOSIS — I70.262 ATHEROSCLEROSIS OF NATIVE ARTERY OF LEFT LOWER EXTREMITY WITH GANGRENE: ICD-10-CM

## 2020-10-22 DIAGNOSIS — I96 GANGRENOUS TOE: ICD-10-CM

## 2020-10-22 DIAGNOSIS — Z72.0 TOBACCO USE: ICD-10-CM

## 2020-10-22 PROBLEM — I70.222 ATHEROSCLEROSIS OF NATIVE ARTERY OF LEFT LOWER EXTREMITY WITH REST PAIN: Status: ACTIVE | Noted: 2020-10-04

## 2020-10-22 PROBLEM — I70.90 ATHEROSCLEROSIS OF ARTERY: Status: ACTIVE | Noted: 2020-10-04

## 2020-10-22 PROBLEM — I70.203 ATHEROSCLEROSIS OF ARTERY OF BOTH LOWER EXTREMITIES: Status: ACTIVE | Noted: 2020-10-04

## 2020-10-22 PROBLEM — I70.209 ATHEROSCLEROSIS OF ARTERY OF LOWER EXTREMITY: Status: ACTIVE | Noted: 2020-10-04

## 2020-10-22 PROCEDURE — 99999 PR PBB SHADOW E&M-EST. PATIENT-LVL III: ICD-10-PCS | Mod: PBBFAC,,, | Performed by: INTERNAL MEDICINE

## 2020-10-22 PROCEDURE — 93005 ELECTROCARDIOGRAM TRACING: CPT | Mod: PBBFAC,PN | Performed by: INTERNAL MEDICINE

## 2020-10-22 PROCEDURE — 93010 ELECTROCARDIOGRAM REPORT: CPT | Mod: S$PBB,,, | Performed by: INTERNAL MEDICINE

## 2020-10-22 PROCEDURE — 99999 PR PBB SHADOW E&M-EST. PATIENT-LVL III: CPT | Mod: PBBFAC,,, | Performed by: INTERNAL MEDICINE

## 2020-10-22 PROCEDURE — 99213 OFFICE O/P EST LOW 20 MIN: CPT | Mod: PBBFAC,PN,25 | Performed by: INTERNAL MEDICINE

## 2020-10-22 PROCEDURE — 99214 PR OFFICE/OUTPT VISIT, EST, LEVL IV, 30-39 MIN: ICD-10-PCS | Mod: S$PBB,25,, | Performed by: INTERNAL MEDICINE

## 2020-10-22 PROCEDURE — 93010 EKG 12-LEAD: ICD-10-PCS | Mod: S$PBB,,, | Performed by: INTERNAL MEDICINE

## 2020-10-22 PROCEDURE — 99214 OFFICE O/P EST MOD 30 MIN: CPT | Mod: S$PBB,25,, | Performed by: INTERNAL MEDICINE

## 2020-10-22 RX ORDER — SODIUM CHLORIDE 9 MG/ML
3 INJECTION, SOLUTION INTRAVENOUS CONTINUOUS
Status: CANCELLED | OUTPATIENT
Start: 2020-10-22 | End: 2020-10-22

## 2020-10-22 NOTE — PROGRESS NOTES
Subjective:    Patient ID:  Tee Nicole is a 50 y.o. male who presents for evaluation of Peripheral Arterial Disease (Ref by Dr Burks) and Arm Pain (left)      PCP: Primary Doctor No     Referring Provider: Sergo Burks    HPI  Pt is a 49 yo M w/ PMH of tobacco abuse (1 PPD, 31 pk/yrs) who presents for evaluation for PAD and possible ischemic L 5th toe.  He was evaluated in the ED 10/4/2020 for toe pain and found to have discoloration and pain following a trauma to his toe that happened 7 months previously.  He had a arterial doppler which noted patent arteries however monophasic flow throughout which was concerning of possible iliac disease.  He notes that he has b/l claudications of buttocks and down w/ about 200 ft of ambulation.  He was planned to have a CTA and evaluation by cardiology however he left AMA prior to his CTA being performed.  He was referred to podiatry and cardiology for further o/p w/u.  He mentions that his toe has been doing better since he was started on the medications and he is able to move his toe and has less pain in it now.  He notes compliance w/ meds and denies side effects.  He denies cp, sob, edema, orthopnea, PND, presyncope, palpitations, and LOC.        Past Medical History:   Diagnosis Date    Kidney stone      History reviewed. No pertinent surgical history.  Social History     Socioeconomic History    Marital status: Single     Spouse name: Not on file    Number of children: Not on file    Years of education: Not on file    Highest education level: Not on file   Occupational History    Not on file   Social Needs    Financial resource strain: Not on file    Food insecurity     Worry: Not on file     Inability: Not on file    Transportation needs     Medical: Not on file     Non-medical: Not on file   Tobacco Use    Smoking status: Former Smoker     Packs/day: 1.00     Types: Cigarettes    Smokeless tobacco: Never Used   Substance and Sexual Activity     Alcohol use: Yes     Comment: occasional    Drug use: No    Sexual activity: Not Currently   Lifestyle    Physical activity     Days per week: Not on file     Minutes per session: Not on file    Stress: Not on file   Relationships    Social connections     Talks on phone: Not on file     Gets together: Not on file     Attends Scientology service: Not on file     Active member of club or organization: Not on file     Attends meetings of clubs or organizations: Not on file     Relationship status: Not on file   Other Topics Concern    Not on file   Social History Narrative    Not on file     Family History   Problem Relation Age of Onset    Stroke Mother     Stroke Father     No Known Problems Sister        Review of patient's allergies indicates:   Allergen Reactions    Pcn [penicillins] Anaphylaxis       Medication List with Changes/Refills   Current Medications    ASPIRIN 81 MG CHEW    Take 1 tablet (81 mg total) by mouth once daily.    ATORVASTATIN (LIPITOR) 80 MG TABLET    Take 1 tablet (80 mg total) by mouth every evening.    CLOPIDOGREL (PLAVIX) 75 MG TABLET    Take 1 tablet (75 mg total) by mouth once daily.    HYDROCODONE-ACETAMINOPHEN (NORCO) 7.5-325 MG PER TABLET    Take 1 tablet by mouth every 12 (twelve) hours as needed for Pain.    ONDANSETRON (ZOFRAN) 8 MG TABLET    Take 1 tablet (8 mg total) by mouth every 8 (eight) hours as needed for Nausea.    TAMSULOSIN (FLOMAX) 0.4 MG CAP    Take 1 capsule (0.4 mg total) by mouth once daily.       Review of Systems   Constitution: Negative for diaphoresis and fever.   HENT: Negative for congestion and hearing loss.    Eyes: Negative for blurred vision and pain.   Cardiovascular: Negative for chest pain, claudication, dyspnea on exertion, leg swelling, near-syncope, palpitations and syncope.   Respiratory: Negative for shortness of breath and sleep disturbances due to breathing.    Hematologic/Lymphatic: Negative for bleeding problem. Does not bruise/bleed  easily.   Skin: Negative for color change and poor wound healing.   Gastrointestinal: Negative for abdominal pain and nausea.   Genitourinary: Negative for bladder incontinence and flank pain.   Neurological: Negative for focal weakness and light-headedness.        Objective:   /84 (BP Location: Left arm, Patient Position: Sitting, BP Method: Large (Manual))   Pulse 87   Ht 6' (1.829 m)   Wt 92 kg (202 lb 12.8 oz)   SpO2 98%   BMI 27.50 kg/m²    Physical Exam   Constitutional: He is oriented to person, place, and time. He appears well-developed and well-nourished.   HENT:   Head: Normocephalic and atraumatic.   Mouth/Throat: Oropharynx is clear and moist.   Eyes: Pupils are equal, round, and reactive to light. EOM are normal. No scleral icterus.   Neck: Normal range of motion. Neck supple. No JVD present.   Cardiovascular: Normal rate, regular rhythm, S1 normal, S2 normal and intact distal pulses. Exam reveals no gallop and no friction rub.   No murmur heard.  Pulses:       Dorsalis pedis pulses are 1+ on the right side and 1+ on the left side.        Posterior tibial pulses are 1+ on the right side and 1+ on the left side.   Pulmonary/Chest: Effort normal and breath sounds normal. No respiratory distress. He has no wheezes. He has no rales. He exhibits no tenderness.   Abdominal: Soft. Bowel sounds are normal. He exhibits no distension and no mass. There is no abdominal tenderness. There is no rebound.   Musculoskeletal: Normal range of motion.         General: Tenderness present. No edema.      Comments: L 5th toe mild discoloration (much improved compared to pictures from 10/4/2020).   Neurological: He is alert and oriented to person, place, and time. He displays normal reflexes. Coordination normal.   Skin: Skin is warm and dry. He is not diaphoretic. No pallor.   Psychiatric: He has a normal mood and affect. His behavior is normal. Judgment normal.         ECG: 10/22/2020- reviewed.  NSR, NL ECG.       Arterial Doppler 10/4/2020- reviewed.    Impression:        No hemodynamically significant focal stenosis demonstrated in the right or left lower extremity arterial system.     Waveform blunting throughout the lower extremities of uncertain etiology or significance.  Recent noncontrast abdomen pelvis CT 07/31/2020 demonstrated no significant more proximal atherosclerotic calcification to suggest significant in the pelvis or abdominal aorta.           Assessment:       1. 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 Ischemic toe    3. Gangrenous toe    4. Tobacco use    5. PVD (peripheral vascular disease)         Plan:         Atherosclerosis of native artery of left lower extremity with gangrene  Pt w/ L ischemic 5th toe which he states is getting better.  He is agreeable to peripheral angio w/ possible PTA however states he is not available until 11/18/2020 due to family reasons although 10/28/2020 was offered to him.  He was informed to present to the ED if he has any worsening of his circulation of his toe.  - continue medical therapy  - encouraged risk factor and lifestyle modifications   1. Peripheral angio with possible PTA.   2. Antiplatelets: ASA and clopidogrel  3. Access: R CFA 5 Fr sheath  4. Catheters: 5 Fr omni/pigtail catheter   5. Pt is a stent candidate and understands the importance of taking plavix for at least 3-6 months in PAD. The patient understands that in case of receiving a drug coated stent the failure to comply with dual anti-platelet therapy as prescribed is likely to result in stent clothing, heart attack and death.   6. The risks, benefits, and alternatives of peripheral angiography and possible intervention were discussed with the patient. All questions were answered and informed consent was obtained. I had a detailed discussion with the patient regarding risk of stroke, MI, bleeding access site complications including limb loss, allergy, kidney  failure including dialysis and death.  7. The patient understands the risks and benefits and wishes to go ahead with the procedure.  8. [facility] Clinical Frailty Scale: Managing well  9. All patient's questions were answered      Ischemic toe  Plan as above.  Followed by podiatry.      Gangrenous toe  Plan as above.    Tobacco use  Pt noted to quit since 10/4/2020.  He is aware of health complications a/w smoking.    - cessation counseling   - cessation course referral declined per pt for now    PVD (peripheral vascular disease)  Plan as above.      Total duration of face to face visit time 30 minutes.  Total time spent counseling greater than fifty percent of total visit time.  Counseling included discussion regarding imaging findings, diagnosis, possibilities, treatment options, risks and benefits.  The patient had many questions regarding the options and long-term effects      Luis Rebollar M.D.  Interventional Cardiology

## 2020-10-22 NOTE — ASSESSMENT & PLAN NOTE
Pt noted to quit since 10/4/2020.  He is aware of health complications a/w smoking.    - cessation counseling   - cessation course referral declined per pt for now

## 2020-10-22 NOTE — ASSESSMENT & PLAN NOTE
Pt w/ L ischemic 5th toe which he states is getting better.  He is agreeable to peripheral angio w/ possible PTA however states he is not available until 11/18/2020 due to family reasons although 10/28/2020 was offered to him.  He was informed to present to the ED if he has any worsening of his circulation of his toe.  - continue medical therapy  - encouraged risk factor and lifestyle modifications   1. Peripheral angio with possible PTA.   2. Antiplatelets: ASA and clopidogrel  3. Access: R CFA 5 Fr sheath  4. Catheters: 5 Fr omni/pigtail catheter   5. Pt is a stent candidate and understands the importance of taking plavix for at least 3-6 months in PAD. The patient understands that in case of receiving a drug coated stent the failure to comply with dual anti-platelet therapy as prescribed is likely to result in stent clothing, heart attack and death.   6. The risks, benefits, and alternatives of peripheral angiography and possible intervention were discussed with the patient. All questions were answered and informed consent was obtained. I had a detailed discussion with the patient regarding risk of stroke, MI, bleeding access site complications including limb loss, allergy, kidney failure including dialysis and death.  7. The patient understands the risks and benefits and wishes to go ahead with the procedure.  8. [facility] Clinical Frailty Scale: Managing well  9. All patient's questions were answered

## 2020-10-22 NOTE — LETTER
October 22, 2020      Sergo Wing MD  [address]  [address]  [facility] [address]           [facility]  [address], [address]  [facility] [address]  Phone: [phone]  Fax: [phone]          Patient: Tee Nicole   MR Number: [id number]   YOB: 1970   Date of Visit: 10/22/2020       Dear Dr. Sergo Wing:    Thank you for referring Tee Nicole to me for evaluation. Attached you will find relevant portions of my assessment and plan of care.    If you have questions, please do not hesitate to call me. I look forward to following Tee Nicole along with you.    Sincerely,    Luis Rebollar III, MD    Enclosure  CC:  No Recipients    If you would like to receive this communication electronically, please contact [email or web address] or [phone] to request more information on [facility] access.    For providers and/or their staff who would like to refer a patient to [facility], please contact us through our one-stop-shop provider referral line, [facility], at [phone].    If you feel you have received this communication in error or would no longer like to receive these types of communications, please e-mail [email or web address]

## 2020-10-26 ENCOUNTER — TELEPHONE (OUTPATIENT)
Dept: CARDIOLOGY | Facility: CLINIC | Age: 50
End: 2020-10-26

## 2020-10-26 DIAGNOSIS — I99.8 ISCHEMIC TOE: ICD-10-CM

## 2020-10-26 RX ORDER — HYDROCODONE BITARTRATE AND ACETAMINOPHEN 7.5; 325 MG/1; MG/1
1 TABLET ORAL EVERY 12 HOURS PRN
Qty: 15 TABLET | Refills: 0 | OUTPATIENT
Start: 2020-10-26

## 2020-10-26 RX ORDER — HYDROCODONE BITARTRATE AND ACETAMINOPHEN 5; 325 MG/1; MG/1
1 TABLET ORAL EVERY 12 HOURS PRN
Qty: 20 TABLET | Refills: 0 | Status: ON HOLD | OUTPATIENT
Start: 2020-10-26 | End: 2020-12-07 | Stop reason: HOSPADM

## 2020-10-26 NOTE — TELEPHONE ENCOUNTER
Attempted to call pt. No answer. Voice message to call office(Cardiologist does not prescribe [facility]-pt. Podiatrist rx.)----- Message from Wandy Cleveland sent at 10/26/2020  2:58 PM CDT -----  Contact: SELF [phone]  Patient would like a refill for HYDROcodone-acetaminophen (NORCO) 7.5-325 mg per tablet sent to [facility] in [place]. Please advise.

## 2020-10-28 ENCOUNTER — TELEPHONE (OUTPATIENT)
Dept: CARDIOLOGY | Facility: CLINIC | Age: 50
End: 2020-10-28

## 2020-10-28 NOTE — TELEPHONE ENCOUNTER
----- Message from Radha Molina sent at 10/28/2020  8:00 AM CDT -----  Regarding: Call back  Contact: [phone]  Patient is calling to talk to nurse in regards to see if his procedure can be rescheduled for something sooner than 11-18-20 since he is having trouble walking.

## 2020-10-28 NOTE — TELEPHONE ENCOUNTER
Patient states that he is having trouble walking and would like to be scheduled sooner for his procedure. Please advise

## 2020-10-29 ENCOUNTER — TELEPHONE (OUTPATIENT)
Dept: GASTROENTEROLOGY | Facility: CLINIC | Age: 50
End: 2020-10-29

## 2020-10-29 NOTE — TELEPHONE ENCOUNTER
Spoke with pt. as per . Pt. procedure date changed to 11/4/2020. Pt.states has lab appt. for tomorrow(resched.from today due to weather) and instructed on Covid pre-op test for Sunday. Pt.verbalizes understanding of instructions given.

## 2020-10-30 ENCOUNTER — OFFICE VISIT (OUTPATIENT)
Dept: PODIATRY | Facility: CLINIC | Age: 50
End: 2020-10-30
Payer: MEDICAID

## 2020-10-30 VITALS
BODY MASS INDEX: 27.31 KG/M2 | HEIGHT: 72 IN | WEIGHT: 201.63 LBS | SYSTOLIC BLOOD PRESSURE: 122 MMHG | DIASTOLIC BLOOD PRESSURE: 82 MMHG

## 2020-10-30 DIAGNOSIS — I73.9 PAD (PERIPHERAL ARTERY DISEASE): ICD-10-CM

## 2020-10-30 DIAGNOSIS — I96 GANGRENOUS TOE: Primary | ICD-10-CM

## 2020-10-30 PROCEDURE — 99213 OFFICE O/P EST LOW 20 MIN: CPT | Mod: S$PBB,,, | Performed by: PODIATRIST

## 2020-10-30 PROCEDURE — 99999 PR PBB SHADOW E&M-EST. PATIENT-LVL III: ICD-10-PCS | Mod: PBBFAC,,, | Performed by: PODIATRIST

## 2020-10-30 PROCEDURE — 99999 PR PBB SHADOW E&M-EST. PATIENT-LVL III: CPT | Mod: PBBFAC,,, | Performed by: PODIATRIST

## 2020-10-30 PROCEDURE — 99213 OFFICE O/P EST LOW 20 MIN: CPT | Mod: PBBFAC,PN | Performed by: PODIATRIST

## 2020-10-30 PROCEDURE — 99213 PR OFFICE/OUTPT VISIT, EST, LEVL III, 20-29 MIN: ICD-10-PCS | Mod: S$PBB,,, | Performed by: PODIATRIST

## 2020-10-30 NOTE — PROGRESS NOTES
Subjective:      Patient ID: Tee Nicole is a 50 y.o. male.    Chief Complaint: Follow-up (left foot )      50 y.o. male presenting with left foot 5th toe discoloration and pain. Was evaluated in urgent care/ED in the past after having injury to left 5th toe. Was recently admitted for PAD/5th toe discoloration but left AMA due to family issue. Ambulating in tennis shoe. Complains of ischemic pain L 5th toe. With dusky, discoloration of tip of 5th toe. Very sensitive to touch and painful. He was being worked up for PAD. Pending [place] evaluation on 10/22 with Dr. Rebollar. History of smoking 1 pack a day for 20 years.     Recent Adup: No hemodynamically significant focal stenosis demonstrated in the right or left lower extremity arterial system. Waveform blunting throughout the lower extremities of uncertain etiology or significance.  Recent noncontrast abdomen pelvis CT 07/31/2020 demonstrated no significant more proximal atherosclerotic calcification to suggest significant in the pelvis or abdominal aorta.     10/30/20 f/u for left 5th toe gangrene. Currently scheduled to have a procedure done with Dr. Rebollar on 11/4/20. With his friend today. Complaining of ischemic pain 5th toe.       Review of Systems   Constitution: Negative for chills, decreased appetite, fever and malaise/fatigue.   HENT: Negative for congestion, ear discharge and sore throat.    Eyes: Negative for discharge and pain.   Cardiovascular: Positive for claudication. Negative for chest pain and leg swelling.   Respiratory: Negative for cough and shortness of breath.    Skin: Positive for color change. Negative for nail changes and rash.   Musculoskeletal: Positive for stiffness. Negative for arthritis, joint pain, joint swelling and muscle weakness.        L 5th toe pain    Gastrointestinal: Negative for bloating, abdominal pain, diarrhea, nausea and vomiting.   Genitourinary: Negative for flank pain and hematuria.   Neurological: Negative  for headaches, numbness and weakness.   Psychiatric/Behavioral: Negative for altered mental status.             Past Medical History:   Diagnosis Date    Kidney stone        History reviewed. No pertinent surgical history.    Family History   Problem Relation Age of Onset    Stroke Mother     Stroke Father     No Known Problems Sister        Social History     Socioeconomic History    Marital status: Single     Spouse name: Not on file    Number of children: Not on file    Years of education: Not on file    Highest education level: Not on file   Occupational History    Not on file   Social Needs    Financial resource strain: Not on file    Food insecurity     Worry: Not on file     Inability: Not on file    Transportation needs     Medical: Not on file     Non-medical: Not on file   Tobacco Use    Smoking status: Former Smoker     Packs/day: 1.00     Types: Cigarettes    Smokeless tobacco: Never Used   Substance and Sexual Activity    Alcohol use: Yes     Comment: occasional    Drug use: No    Sexual activity: Not Currently   Lifestyle    Physical activity     Days per week: Not on file     Minutes per session: Not on file    Stress: Not on file   Relationships    Social connections     Talks on phone: Not on file     Gets together: Not on file     Attends Ep[employer] service: Not on file     Active member of club or organization: Not on file     Attends meetings of clubs or organizations: Not on file     Relationship status: Not on file   Other Topics Concern    Not on file   Social History Narrative    Not on file       Current Outpatient Medications   Medication Sig Dispense Refill    aspirin 81 MG Chew Take 1 tablet (81 mg total) by mouth once daily. 30 tablet 11    atorvastatin (LIPITOR) 80 MG tablet Take 1 tablet (80 mg total) by mouth every evening. 90 tablet 3    clopidogreL (PLAVIX) 75 mg tablet Take 1 tablet (75 mg total) by mouth once daily. 30 tablet 11     HYDROcodone-acetaminophen (NORCO) 5-325 mg per tablet Take 1 tablet by mouth every 12 (twelve) hours as needed for Pain. 20 tablet 0    HYDROcodone-acetaminophen (NORCO) 7.5-325 mg per tablet Take 1 tablet by mouth every 12 (twelve) hours as needed for Pain. (Patient not taking: Reported on 10/30/2020) 15 tablet 0    ondansetron (ZOFRAN) 8 MG tablet Take 1 tablet (8 mg total) by mouth every 8 (eight) hours as needed for Nausea. (Patient not taking: Reported on 10/22/2020) 15 tablet 0    tamsulosin (FLOMAX) 0.4 mg Cap Take 1 capsule (0.4 mg total) by mouth once daily. (Patient not taking: Reported on 10/22/2020) 10 capsule 0     No current facility-administered medications for this visit.        Review of patient's allergies indicates:   Allergen Reactions    Pcn [penicillins] Anaphylaxis       Vitals:    10/30/20 1407   BP: 122/82   Weight: 91.4 kg (201 lb 9.6 oz)   Height: 6' (1.829 m)   PainSc:   3       Objective:      Physical Exam  Constitutional:       General: He is not in acute distress.     Appearance: He is well-developed.   HENT:      Nose: Nose normal.   Eyes:      Conjunctiva/sclera: Conjunctivae normal.   Neck:      Musculoskeletal: Normal range of motion.   Pulmonary:      Effort: Pulmonary effort is normal.   Chest:      Chest wall: No tenderness.   Abdominal:      Tenderness: There is no abdominal tenderness.   Neurological:      Mental Status: He is alert and oriented to person, place, and time.   Psychiatric:         Behavior: Behavior normal.         Vascular: Distal DP/PT pulses palpable 1/4 and 0/4 PT. No vericosities noted to LEs. warm to touch LE and cool to touch to left 5th toe, edema noted to L 5th toe.    Dermatologic:   L foot: dusky changes to 5th toe distally. No open lesion. No maceration. Mild rubor but no erythema, no drainage.     Musculoskeletal: No calf tenderness LE, Compartments soft/compressible.   L foot: pain distal 5th toe.     Neurological: Light touch, proprioception,  and sharp/dull sensation are all intact. Protective threshold with the Denver-Wienstein monofilament is intact. Vibratory sensation intact.           Assessment:       No diagnosis found.      Plan:       There are no diagnoses linked to this encounter.  I counseled the patient on his conditions, their implications and medical management.    50 y.o. male with L foot 5th toe early gangrenous/dusky changes.     -no open ulcer noted.  Dry gangrenous changes of the 5th.  Patient is pending evaluation with Dr. Rebollar on 11/4/20  -we discussed possible losing 5th toe if it turns infection/complete gangrene.     -The nature of the condition, options for management, as well as potential risks and complications were discussed in detail with patient. Patient was amenable to my recommendations and left my office fully informed and will follow up as instructed or sooner if necessary.    -Patient was advised of signs and symptoms of infection including redness, drainage, purulence, odor, streaking, fever, chills and I advised patient to seek medical attention (ER or urgent care) if these symptoms arise.   -Discuss in detail the harmful effects of nicotine/tobacco/cigarette smoking, especially in relation to the lower extremity. Recommend consultation with primary care provider for further discussed of smoking cessation methods. Smoking & Tobacco use cessation couseling was rendered at today's visit; intermediate, bewteen 3 and 10 minutes.  -f/u 2-3 weeks    Note dictated with voice recognition software, please excuse any grammatical errors.

## 2020-11-01 ENCOUNTER — CLINICAL SUPPORT (OUTPATIENT)
Dept: URGENT CARE | Facility: CLINIC | Age: 50
End: 2020-11-01
Payer: MEDICAID

## 2020-11-01 DIAGNOSIS — Z01.818 PRE-OP TESTING: ICD-10-CM

## 2020-11-01 LAB — SARS-COV-2 RNA RESP QL NAA+PROBE: NOT DETECTED

## 2020-11-01 PROCEDURE — U0003 INFECTIOUS AGENT DETECTION BY NUCLEIC ACID (DNA OR RNA); SEVERE ACUTE RESPIRATORY SYNDROME CORONAVIRUS 2 (SARS-COV-2) (CORONAVIRUS DISEASE [COVID-19]), AMPLIFIED PROBE TECHNIQUE, MAKING USE OF HIGH THROUGHPUT TECHNOLOGIES AS DESCRIBED BY CMS-2020-01-R: HCPCS

## 2020-11-03 ENCOUNTER — SSC ENCOUNTER (OUTPATIENT)
Dept: ADMINISTRATIVE | Facility: OTHER | Age: 50
End: 2020-11-03

## 2020-11-03 NOTE — PROGRESS NOTES
Please note the following patient's information was forwarded to the Medicaid ([place] Medicaid, [employer], [employer], [facility], [facility], [facility]) Case Management office.    Please contact Outpatient Care Management at [phone] (Ext. [phone]) with any questions.    Thank you,    Rahda Nino, [facility]  Outpatient Case Mgmnt  [phone]

## 2020-11-04 PROBLEM — I74.09 LERICHE SYNDROME: Status: ACTIVE | Noted: 2020-11-04

## 2020-11-05 ENCOUNTER — TELEPHONE (OUTPATIENT)
Dept: VASCULAR SURGERY | Facility: CLINIC | Age: 50
End: 2020-11-05

## 2020-11-05 NOTE — TELEPHONE ENCOUNTER
Attempted to contact patient to schedule appt with Dr. Garcia. Voice message left for patient requesting return call to schedule. Will reattempt. 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  Please let us know if this is possible. Thank you!    Referral # [id number]        Referral Information    Referral # Creation Date Referral Status Status Update   [id number] 11/05/2020 Authorized 11/05/2020: Status History  Status Reason Referral Type Referral Reasons Referral Class  No Approval Necessary - Patient Tracking Consultation Specialty Services Required Internal  To Specialty To Provider To Location/Place of Service To Department  Vascular Surgery Terrance Garcia MD none none  To Vendor Referred By By Location/Place of Service By Department  none Luis Rebollar III, MD [facility] CARDIOLOGY  Priority Start Date Expiration Date Referral Entered By  Routine 11/05/2020 11/05/2021 Luis Rebollar III, MD      Procedure Details   Procedure Modifiers Provider Requested Approved  [id number] - Ambulatory referral/consult to Vascular Surgery None Terrance Garcia MD 1 1  Diagnosis Information    Diagnosis  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

## 2020-11-06 ENCOUNTER — TELEPHONE (OUTPATIENT)
Dept: VASCULAR SURGERY | Facility: CLINIC | Age: 50
End: 2020-11-06

## 2020-11-06 DIAGNOSIS — I74.3 EMBOLISM AND THROMBOSIS OF ARTERY OF LOWER EXTREMITY: Primary | ICD-10-CM

## 2020-11-06 NOTE — TELEPHONE ENCOUNTER
Contacted patient in response to message. Notified patient that he has been referred to Dr. Garcia for evaluation of Leriche syndrome. Appointment scheduled, pt verified. ----- Message from Yolette Ruiz sent at 11/6/2020  9:44 AM CST -----  Pt is returning a missed call he received from Kelly    Contact info [phone]

## 2020-11-06 NOTE — PROGRESS NOTES
Patient with thrombotic stenosis of the distal aorta.  Will evaluate for possible cardioembolic source with echo w/ bubble and 48 hr Holter to eval for arrhythmia.        Luis Rebollar M.D.  Interventional Cardiology

## 2020-11-09 ENCOUNTER — INITIAL CONSULT (OUTPATIENT)
Dept: VASCULAR SURGERY | Facility: CLINIC | Age: 50
End: 2020-11-09
Payer: MEDICAID

## 2020-11-09 VITALS
HEIGHT: 72 IN | WEIGHT: 198.44 LBS | TEMPERATURE: 99 F | DIASTOLIC BLOOD PRESSURE: 78 MMHG | HEART RATE: 93 BPM | BODY MASS INDEX: 26.88 KG/M2 | SYSTOLIC BLOOD PRESSURE: 123 MMHG

## 2020-11-09 DIAGNOSIS — I74.09 LERICHE SYNDROME: Primary | ICD-10-CM

## 2020-11-09 DIAGNOSIS — I73.9 PERIPHERAL VASCULAR DISEASE, UNSPECIFIED: ICD-10-CM

## 2020-11-09 PROCEDURE — 99999 PR PBB SHADOW E&M-EST. PATIENT-LVL III: ICD-10-PCS | Mod: PBBFAC,,, | Performed by: SURGERY

## 2020-11-09 PROCEDURE — 99999 PR PBB SHADOW E&M-EST. PATIENT-LVL III: CPT | Mod: PBBFAC,,, | Performed by: SURGERY

## 2020-11-09 PROCEDURE — 99213 OFFICE O/P EST LOW 20 MIN: CPT | Mod: PBBFAC | Performed by: SURGERY

## 2020-11-09 PROCEDURE — 99203 OFFICE O/P NEW LOW 30 MIN: CPT | Mod: S$PBB,,, | Performed by: SURGERY

## 2020-11-09 PROCEDURE — 99203 PR OFFICE/OUTPT VISIT, NEW, LEVL III, 30-44 MIN: ICD-10-PCS | Mod: S$PBB,,, | Performed by: SURGERY

## 2020-11-09 NOTE — LETTER
November 23, 2020      Luis Rebollar III, MD  [address]   [address]   [facility] [address]           [facility] - Vascular Surg 5th Fl  [address]  [facility] [address]  Phone: [phone]  Fax: [phone]          Patient: Tee Nicole   MR Number: [id number]   YOB: 1970   Date of Visit: 11/9/2020       Dear Dr. Luis Rebollar III:    Thank you for referring Tee Nicole to me for evaluation. Attached you will find relevant portions of my assessment and plan of care.    If you have questions, please do not hesitate to call me. I look forward to following Tee Nicole along with you.    Sincerely,    Terrance Garcia MD    Enclosure  CC:  No Recipients    If you would like to receive this communication electronically, please contact [email or web address] or [phone] to request more information on [employer] Link access.    For providers and/or their staff who would like to refer a patient to [facility], please contact us through our one-stop-shop provider referral line, [facility] [place], at [phone].    If you feel you have received this communication in error or would no longer like to receive these types of communications, please e-mail [email or web address]

## 2020-11-09 NOTE — PROGRESS NOTES
"Tee Nicole  11/09/2020    HPI:  Patient is a 50 y.o. male with a h/o kidney stones who is here today for evaluation of  Nonhealing ulcer. He began having claudication symptoms from his buttocks to his thighs while walking in January 2020 that was sudden in onset. He attributed his symptoms to "being in bad shape" and tried to go on walks to improve his symptoms, but it didn't help. He is unable to walk for less than half a block before the pain begins. In August, he stubbed his toe twice and his toe wasn't able to support any weight anymore. He then developed pain while supine for an hour. He went to the ED and was admitted and was told that he had a clot in his distal aorta. He has a likely embolic ulcer in his left fifth toe. He is not complaining of impotence. He was seen by Dr. Rebollar who performed an angiogram revealing large clot in his aorta. He was start on aspirin and plavix, which he feels has improved his symptoms and he is able to now sleep throughout the night. He was referred here by Dr. Rebollar.     Unfortunately, he recently lost his job working at a [employer].     Denies MI/stroke  Tobacco use: 1 ppd for 30 years. Stopped in September 2020    Past Medical History:   Diagnosis Date    Kidney stone      Past Surgical History:   Procedure Laterality Date    AORTOGRAPHY WITH SERIALOGRAPHY N/A 11/4/2020    Procedure: AORTOGRAM, WITH SERIALOGRAPHY;  Surgeon: Luis Rebollar III, MD;  [facility]: [facility];  Service: Cardiology;  Laterality: N/A;     Family History   Problem Relation Age of Onset    Stroke Mother     Stroke Father     No Known Problems Sister      Social History     Socioeconomic History    Marital status: Single     Spouse name: Not on file    Number of children: Not on file    Years of education: Not on file    Highest education level: Not on file   Occupational History    Not on file   Social Needs    Financial resource strain: Not on file    Food insecurity "     Worry: Not on file     Inability: Not on file    Transportation needs     Medical: Not on file     Non-medical: Not on file   Tobacco Use    Smoking status: Former Smoker     Packs/day: 1.00     Types: Cigarettes    Smokeless tobacco: Never Used   Substance and Sexual Activity    Alcohol use: Yes     Comment: occasional    Drug use: No    Sexual activity: Not Currently   Lifestyle    Physical activity     Days per week: Not on file     Minutes per session: Not on file    Stress: Not on file   Relationships    Social connections     Talks on phone: Not on file     Gets together: Not on file     Attends Cheondoism service: Not on file     Active member of club or organization: Not on file     Attends meetings of clubs or organizations: Not on file     Relationship status: Not on file   Other Topics Concern    Not on file   Social History Narrative    Not on file       Current Outpatient Medications:     aspirin 81 MG Chew, Take 1 tablet (81 mg total) by mouth once daily., Disp: 30 tablet, Rfl: 11    atorvastatin (LIPITOR) 80 MG tablet, Take 1 tablet (80 mg total) by mouth every evening., Disp: 90 tablet, Rfl: 3    clopidogreL (PLAVIX) 75 mg tablet, Take 1 tablet (75 mg total) by mouth once daily., Disp: 30 tablet, Rfl: 11    HYDROcodone-acetaminophen (NORCO) 5-325 mg per tablet, Take 1 tablet by mouth every 12 (twelve) hours as needed for Pain., Disp: 20 tablet, Rfl: 0    HYDROcodone-acetaminophen (NORCO) 7.5-325 mg per tablet, Take 1 tablet by mouth every 12 (twelve) hours as needed for Pain. (Patient not taking: Reported on 10/30/2020), Disp: 15 tablet, Rfl: 0    ondansetron (ZOFRAN) 8 MG tablet, Take 1 tablet (8 mg total) by mouth every 8 (eight) hours as needed for Nausea. (Patient not taking: Reported on 10/22/2020), Disp: 15 tablet, Rfl: 0    tamsulosin (FLOMAX) 0.4 mg Cap, Take 1 capsule (0.4 mg total) by mouth once daily. (Patient not taking: Reported on 11/9/2020), Disp: 10 capsule, Rfl:  0    REVIEW OF SYSTEMS:  General: negative; ENT: negative; Allergy and Immunology: negative; Hematological and Lymphatic: Negative; Endocrine: negative; Respiratory: no cough, shortness of breath, or wheezing; Cardiovascular: no chest pain or dyspnea on exertion; Gastrointestinal: no abdominal pain/back, change in bowel habits, or bloody stools; Genito-Urinary: no dysuria, trouble voiding, or hematuria; Musculoskeletal: negative  Neurological: no TIA or stroke symptoms; Psychiatric: no nervousness, anxiety or depression.    PHYSICAL EXAM:   Right Arm BP - Sittin/78 (20 1317)  Left Arm BP - Sittin/83 (20 1317)  Pulse: 93  Temp: 98.6 °F (37 °C)      General appearance:  Alert, well-appearing, and in no distress.  Oriented to person, place, and time   Neurological: Normal speech, no focal findings noted; CN II - XII grossly intact           Musculoskeletal: Digits/nail without cyanosis/clubbing.  Normal muscle strength/tone.                 Neck: Supple, no significant adenopathy; thyroid is not enlarged                Chest:  Clear to auscultation, no wheezes, rales or rhonchi, symmetric air entry      No use of accessory muscles             Cardiac: Normal rate and regular rhythm, S1 and S2 normal; PMI non-displaced          Abdomen: Soft, nontender, nondistended, no masses or organomegaly      No rebound tenderness noted; bowel sounds normal      Pulsatile aortic mass is not palpable.      No groin adenopathy      Extremities:   R groin with dressing from angiogram. Clean, dry, intact      1+ femoral pulse on left, no femoral pulse on right      Not palpable pedal pulses      No pre-tibial edema      Ulcer on the plantar aspect of the fifth toe. Looks embolic in origin. Healing, has scab    LAB RESULTS:  Lab Results   Component Value Date    K 4.0 10/30/2020    K 4.5 10/04/2020    K 4.2 10/04/2020    CREATININE 0.97 10/30/2020    CREATININE 1.0 10/04/2020    CREATININE 1.0 10/04/2020     Lab  Results   Component Value Date    WBC 10.51 10/30/2020    WBC 10.88 10/04/2020    WBC 11.54 10/04/2020    HCT 48.1 10/30/2020    HCT 46.3 10/04/2020    HCT 42.5 10/04/2020     10/30/2020     10/04/2020     10/04/2020     Lab Results   Component Value Date    HGBA1C 5.6 10/04/2020     IMAGING:  Angiogram showed occluded aorta with collateral flow    IMP/PLAN:  50 y.o. male with an occlusive thrombus in his aorta.    - CTA chest/abdomen/pelvis  - FIGUEROA with toe PPG  - RTC in 1 week with imaging results  - stop aspirin, continue plavix, will start Eliquis 5 bid  - following with Cardiology. Will have Holter and echo done this week    Reginald Du [facility]

## 2020-11-10 ENCOUNTER — HOSPITAL ENCOUNTER (OUTPATIENT)
Dept: CARDIOLOGY | Facility: HOSPITAL | Age: 50
Discharge: HOME | End: 2020-11-10
Attending: INTERNAL MEDICINE
Payer: MEDICAID

## 2020-11-10 VITALS — HEIGHT: 72 IN | BODY MASS INDEX: 26.82 KG/M2 | WEIGHT: 198 LBS

## 2020-11-10 DIAGNOSIS — I74.3 EMBOLISM AND THROMBOSIS OF ARTERY OF LOWER EXTREMITY: ICD-10-CM

## 2020-11-10 LAB
AORTIC ROOT ANNULUS: 2.67 CM
AORTIC VALVE CUSP SEPERATION: 1.77 CM
AV INDEX (PROSTH): 0.9
AV MEAN GRADIENT: 3 MMHG
AV PEAK GRADIENT: 5 MMHG
AV VALVE AREA: 2.76 CM2
AV VELOCITY RATIO: 0.79
BSA FOR ECHO PROCEDURE: 2.14 M2
CV ECHO LV RWT: 0.41 CM
DOP CALC AO PEAK VEL: 1.12 M/S
DOP CALC AO VTI: 19.04 CM
DOP CALC LVOT AREA: 3 CM2
DOP CALC LVOT DIAMETER: 1.97 CM
DOP CALC LVOT PEAK VEL: 0.88 M/S
DOP CALC LVOT STROKE VOLUME: 52.46 CM3
DOP CALCLVOT PEAK VEL VTI: 17.22 CM
E WAVE DECELERATION TIME: 156.14 MSEC
E/A RATIO: 0.77
E/E' RATIO: 8.74 M/S
ECHO LV POSTERIOR WALL: 0.8 CM (ref 0.6–1.1)
FRACTIONAL SHORTENING: 33 % (ref 28–44)
INTERVENTRICULAR SEPTUM: 1.1 CM (ref 0.6–1.1)
IVRT: 72.31 MSEC
LA MAJOR: 4.66 CM
LA MINOR: 4.37 CM
LA WIDTH: 3.29 CM
LEFT ATRIUM SIZE: 2.93 CM
LEFT ATRIUM VOLUME INDEX: 17.4 ML/M2
LEFT ATRIUM VOLUME: 36.96 CM3
LEFT INTERNAL DIMENSION IN SYSTOLE: 2.59 CM (ref 2.1–4)
LEFT VENTRICLE DIASTOLIC VOLUME INDEX: 30.75 ML/M2
LEFT VENTRICLE DIASTOLIC VOLUME: 65.22 ML
LEFT VENTRICLE MASS INDEX: 53 G/M2
LEFT VENTRICLE SYSTOLIC VOLUME INDEX: 11.4 ML/M2
LEFT VENTRICLE SYSTOLIC VOLUME: 24.27 ML
LEFT VENTRICULAR INTERNAL DIMENSION IN DIASTOLE: 3.88 CM (ref 3.5–6)
LEFT VENTRICULAR MASS: 112.66 G
LV LATERAL E/E' RATIO: 7.55 M/S
LV SEPTAL E/E' RATIO: 10.38 M/S
MV PEAK A VEL: 1.08 M/S
MV PEAK E VEL: 0.83 M/S
MV STENOSIS PRESSURE HALF TIME: 45.28 MS
MV VALVE AREA P 1/2 METHOD: 4.86 CM2
PISA TR MAX VEL: 2.02 M/S
PULM VEIN S/D RATIO: 1.41
PV PEAK D VEL: 0.39 M/S
PV PEAK S VEL: 0.55 M/S
PV PEAK VELOCITY: 1.09 CM/S
RA MAJOR: 5.42 CM
RA PRESSURE: 3 MMHG
RA WIDTH: 3.77 CM
RIGHT VENTRICULAR END-DIASTOLIC DIMENSION: 2.52 CM
TDI LATERAL: 0.11 M/S
TDI SEPTAL: 0.08 M/S
TDI: 0.1 M/S
TR MAX PG: 16 MMHG
TV REST PULMONARY ARTERY PRESSURE: 19 MMHG

## 2020-11-10 PROCEDURE — 93306 ECHO (CUPID ONLY): ICD-10-PCS | Mod: 26,,, | Performed by: INTERNAL MEDICINE

## 2020-11-10 PROCEDURE — C8929 TTE W OR WO FOL WCON,DOPPLER: HCPCS

## 2020-11-10 PROCEDURE — 93226 XTRNL ECG REC<48 HR SCAN A/R: CPT

## 2020-11-10 PROCEDURE — 93306 TTE W/DOPPLER COMPLETE: CPT | Mod: 26,,, | Performed by: INTERNAL MEDICINE

## 2020-11-10 PROCEDURE — 93227 HOLTER MONITOR - 48 HOUR (CUPID ONLY): ICD-10-PCS | Mod: ,,, | Performed by: INTERNAL MEDICINE

## 2020-11-10 PROCEDURE — 93227 XTRNL ECG REC<48 HR R&I: CPT | Mod: ,,, | Performed by: INTERNAL MEDICINE

## 2020-11-12 ENCOUNTER — TELEPHONE (OUTPATIENT)
Dept: PODIATRY | Facility: CLINIC | Age: 50
End: 2020-11-12

## 2020-11-13 ENCOUNTER — OFFICE VISIT (OUTPATIENT)
Dept: PODIATRY | Facility: CLINIC | Age: 50
End: 2020-11-13
Payer: MEDICAID

## 2020-11-13 VITALS — WEIGHT: 200.69 LBS | BODY MASS INDEX: 27.18 KG/M2 | HEIGHT: 72 IN | RESPIRATION RATE: 16 BRPM

## 2020-11-13 DIAGNOSIS — I73.9 PAD (PERIPHERAL ARTERY DISEASE): ICD-10-CM

## 2020-11-13 DIAGNOSIS — L97.522 ISCHEMIC ULCER OF TOE OF LEFT FOOT WITH FAT LAYER EXPOSED: Primary | ICD-10-CM

## 2020-11-13 DIAGNOSIS — Z72.0 TOBACCO USE: ICD-10-CM

## 2020-11-13 PROCEDURE — 99999 PR PBB SHADOW E&M-EST. PATIENT-LVL III: CPT | Mod: PBBFAC,,, | Performed by: PODIATRIST

## 2020-11-13 PROCEDURE — 99213 PR OFFICE/OUTPT VISIT, EST, LEVL III, 20-29 MIN: ICD-10-PCS | Mod: S$PBB,,, | Performed by: PODIATRIST

## 2020-11-13 PROCEDURE — 99213 OFFICE O/P EST LOW 20 MIN: CPT | Mod: PBBFAC,PN | Performed by: PODIATRIST

## 2020-11-13 PROCEDURE — 99999 PR PBB SHADOW E&M-EST. PATIENT-LVL III: ICD-10-PCS | Mod: PBBFAC,,, | Performed by: PODIATRIST

## 2020-11-13 PROCEDURE — 99213 OFFICE O/P EST LOW 20 MIN: CPT | Mod: S$PBB,,, | Performed by: PODIATRIST

## 2020-11-13 NOTE — PROGRESS NOTES
Subjective:      Patient ID: Tee Nicole is a 50 y.o. male.    Chief Complaint: Follow-up and gangrenous toe (left small toe)      50 y.o. male presenting with left foot 5th toe discoloration and pain. Was evaluated in urgent care/ED in the past after having injury to left 5th toe. Was recently admitted for PAD/5th toe discoloration but left AMA due to family issue. Ambulating in tennis shoe. Complains of ischemic pain L 5th toe. With dusky, discoloration of tip of 5th toe. Very sensitive to touch and painful. He was being worked up for PAD. Pending [place] evaluation on 10/22 with Dr. Rebollar. History of smoking 1 pack a day for 20 years.     Recent Adup: No hemodynamically significant focal stenosis demonstrated in the right or left lower extremity arterial system. Waveform blunting throughout the lower extremities of uncertain etiology or significance.  Recent noncontrast abdomen pelvis CT 07/31/2020 demonstrated no significant more proximal atherosclerotic calcification to suggest significant in the pelvis or abdominal aorta.     10/30/20 f/u for left 5th toe gangrene. Currently scheduled to have a procedure done with Dr. Rebollar on 11/4/20. With his friend today. Complaining of ischemic pain 5th toe.     11/13/20 f/u for left 5th toe ischemic ulcer.  Status post angiogram by Dr. Rebollar revealing large clot at his aorta.  Was started on aspirin and Plavix.  Patient was subsequently seen by vascular surgery.  Recommendation was to stop Aspirin, continue Plavix with Eliquis.  Pending CTA and FIGUEROA.  Ambulating in tennis shoes.  Ischemic pain overall improved.      Review of Systems   Constitution: Negative for chills, decreased appetite, fever and malaise/fatigue.   HENT: Negative for congestion, ear discharge and sore throat.    Eyes: Negative for discharge and pain.   Cardiovascular: Positive for claudication. Negative for chest pain and leg swelling.   Respiratory: Negative for cough and shortness of  breath.    Skin: Positive for color change. Negative for nail changes and rash.   Musculoskeletal: Positive for stiffness. Negative for arthritis, joint pain, joint swelling and muscle weakness.        L 5th toe pain    Gastrointestinal: Negative for bloating, abdominal pain, diarrhea, nausea and vomiting.   Genitourinary: Negative for flank pain and hematuria.   Neurological: Negative for headaches, numbness and weakness.   Psychiatric/Behavioral: Negative for altered mental status.             Past Medical History:   Diagnosis Date    Kidney stone        Past Surgical History:   Procedure Laterality Date    AORTOGRAPHY WITH SERIALOGRAPHY N/A 11/4/2020    Procedure: AORTOGRAM, WITH SERIALOGRAPHY;  Surgeon: Luis Rebollar III, MD;  Location: [facility] CATH LAB;  Service: Cardiology;  Laterality: N/A;       Family History   Problem Relation Age of Onset    Stroke Mother     Stroke Father     No Known Problems Sister        Social History     Socioeconomic History    Marital status: Single     Spouse name: Not on file    Number of children: Not on file    Years of education: Not on file    Highest education level: Not on file   Occupational History    Not on file   Social Needs    Financial resource strain: Not on file    Food insecurity     Worry: Not on file     Inability: Not on file    Transportation needs     Medical: Not on file     Non-medical: Not on file   Tobacco Use    Smoking status: Former Smoker     Packs/day: 1.00     Types: Cigarettes    Smokeless tobacco: Never Used   Substance and Sexual Activity    Alcohol use: Yes     Comment: occasional    Drug use: No    Sexual activity: Not Currently   Lifestyle    Physical activity     Days per week: Not on file     Minutes per session: Not on file    Stress: Not on file   Relationships    Social connections     Talks on phone: Not on file     Gets together: Not on file     Attends Nondenominational service: Not on file     Active member of club or  organization: Not on file     Attends meetings of clubs or organizations: Not on file     Relationship status: Not on file   Other Topics Concern    Not on file   Social History Narrative    Not on file       Current Outpatient Medications   Medication Sig Dispense Refill    apixaban (ELIQUIS) 5 mg Tab Take 1 tablet (5 mg total) by mouth 2 (two) times daily. 60 tablet 2    aspirin 81 MG Chew Take 1 tablet (81 mg total) by mouth once daily. 30 tablet 11    atorvastatin (LIPITOR) 80 MG tablet Take 1 tablet (80 mg total) by mouth every evening. 90 tablet 3    clopidogreL (PLAVIX) 75 mg tablet Take 1 tablet (75 mg total) by mouth once daily. 30 tablet 11    HYDROcodone-acetaminophen (NORCO) 5-325 mg per tablet Take 1 tablet by mouth every 12 (twelve) hours as needed for Pain. 20 tablet 0    HYDROcodone-acetaminophen (NORCO) 7.5-325 mg per tablet Take 1 tablet by mouth every 12 (twelve) hours as needed for Pain. (Patient not taking: Reported on 10/30/2020) 15 tablet 0    ondansetron (ZOFRAN) 8 MG tablet Take 1 tablet (8 mg total) by mouth every 8 (eight) hours as needed for Nausea. (Patient not taking: Reported on 10/22/2020) 15 tablet 0    tamsulosin (FLOMAX) 0.4 mg Cap Take 1 capsule (0.4 mg total) by mouth once daily. (Patient not taking: Reported on 11/9/2020) 10 capsule 0     No current facility-administered medications for this visit.        Review of patient's allergies indicates:   Allergen Reactions    Pcn [penicillins] Anaphylaxis       Vitals:    11/13/20 1330   Resp: 16   Weight: 91 kg (200 lb 11.2 oz)   Height: 6' (1.829 m)   PainSc:   2   PainLoc: Toe       Objective:      Physical Exam  Constitutional:       General: He is not in acute distress.     Appearance: He is well-developed.   HENT:      Nose: Nose normal.   Eyes:      Conjunctiva/sclera: Conjunctivae normal.   Neck:      Musculoskeletal: Normal range of motion.   Pulmonary:      Effort: Pulmonary effort is normal.   Chest:      Chest  wall: No tenderness.   Abdominal:      Tenderness: There is no abdominal tenderness.   Neurological:      Mental Status: He is alert and oriented to person, place, and time.   Psychiatric:         Behavior: Behavior normal.         Vascular: Distal DP/PT pulses palpable 1/4 and 0/4 PT. No vericosities noted to LEs. warm to touch LE and cool to touch to left 5th toe, edema noted to L 5th toe.    Dermatologic:   L foot: dusky changes to 5th toe distally with ischemic ulcer.  No sinus tract no undermining, no active drainage. + epidermolysis.  Size of the ulcer less than 0.5 cm in diameter.    Musculoskeletal: No calf tenderness LE, Compartments soft/compressible.   L foot: pain distal 5th toe.     Neurological: Light touch, proprioception, and sharp/dull sensation are all intact. Protective threshold with the Marble-Wienstein monofilament is intact. Vibratory sensation intact.               Assessment:       Encounter Diagnoses   Name Primary?    PAD (peripheral artery disease)     Ischemic ulcer of toe of left foot with fat layer exposed Yes    Tobacco use          Plan:       Tee was seen today for follow-up and gangrenous toe.    Diagnoses and all orders for this visit:    Ischemic ulcer of toe of left foot with fat layer exposed    PAD (peripheral artery disease)    Tobacco use      I counseled the patient on his conditions, their implications and medical management.    50 y.o. male with L foot 5th toe dusky changes with an open ulcer.     -appreciate Dr. Rebollar and Vascular input. L 5th toe appears to be stable from my standpoint. Recommend to c/w LWC with betadine. Follow vascular recommendation. I will see him after vascular procedure.    -The nature of the condition, options for management, as well as potential risks and complications were discussed in detail with patient. Patient was amenable to my recommendations and left my office fully informed and will follow up as instructed or sooner if  necessary.    -Patient was advised of signs and symptoms of infection including redness, drainage, purulence, odor, streaking, fever, chills and I advised patient to seek medical attention (ER or urgent care) if these symptoms arise.   -Discuss in detail the harmful effects of nicotine/tobacco/cigarette smoking, especially in relation to the lower extremity. Recommend consultation with primary care provider for further discussed of smoking cessation methods. Smoking & Tobacco use cessation couseling was rendered at today's visit; intermediate, bewteen 3 and 10 minutes.  -f/u after vascular procedure or sooner if problem arise.     Note dictated with voice recognition software, please excuse any grammatical errors.

## 2020-11-16 ENCOUNTER — HOSPITAL ENCOUNTER (OUTPATIENT)
Dept: VASCULAR SURGERY | Facility: CLINIC | Age: 50
Discharge: HOME | End: 2020-11-16
Attending: SURGERY
Payer: MEDICAID

## 2020-11-16 ENCOUNTER — OFFICE VISIT (OUTPATIENT)
Dept: VASCULAR SURGERY | Facility: CLINIC | Age: 50
End: 2020-11-16
Payer: MEDICAID

## 2020-11-16 ENCOUNTER — HOSPITAL ENCOUNTER (OUTPATIENT)
Dept: RADIOLOGY | Facility: HOSPITAL | Age: 50
Discharge: HOME | End: 2020-11-16
Attending: SURGERY
Payer: MEDICAID

## 2020-11-16 VITALS
SYSTOLIC BLOOD PRESSURE: 114 MMHG | DIASTOLIC BLOOD PRESSURE: 78 MMHG | HEIGHT: 72 IN | BODY MASS INDEX: 27.47 KG/M2 | WEIGHT: 202.81 LBS | HEART RATE: 98 BPM | TEMPERATURE: 99 F

## 2020-11-16 DIAGNOSIS — I70.0 AORTIC OCCLUSION: ICD-10-CM

## 2020-11-16 DIAGNOSIS — Z01.818 PRE-OP EVALUATION: Primary | ICD-10-CM

## 2020-11-16 DIAGNOSIS — I73.9 PERIPHERAL VASCULAR DISEASE, UNSPECIFIED: ICD-10-CM

## 2020-11-16 LAB
OHS CV EVENT MONITOR DAY: 0
OHS CV HOLTER LENGTH DECIMAL HOURS: 48
OHS CV HOLTER LENGTH HOURS: 48
OHS CV HOLTER LENGTH MINUTES: 0

## 2020-11-16 PROCEDURE — 75635 CTA RUNOFF ABD PEL BILAT LOWER EXT: ICD-10-PCS | Mod: 26,,, | Performed by: RADIOLOGY

## 2020-11-16 PROCEDURE — 25500020 PHARM REV CODE 255: Performed by: SURGERY

## 2020-11-16 PROCEDURE — 93923 UPR/LXTR ART STDY 3+ LVLS: CPT | Mod: 26,S$PBB,, | Performed by: SURGERY

## 2020-11-16 PROCEDURE — 99999 PR PBB SHADOW E&M-EST. PATIENT-LVL III: CPT | Mod: PBBFAC,,, | Performed by: SURGERY

## 2020-11-16 PROCEDURE — 99213 PR OFFICE/OUTPT VISIT, EST, LEVL III, 20-29 MIN: ICD-10-PCS | Mod: S$PBB,,, | Performed by: SURGERY

## 2020-11-16 PROCEDURE — 93923 UPR/LXTR ART STDY 3+ LVLS: CPT | Mod: PBBFAC | Performed by: SURGERY

## 2020-11-16 PROCEDURE — 99213 OFFICE O/P EST LOW 20 MIN: CPT | Mod: S$PBB,,, | Performed by: SURGERY

## 2020-11-16 PROCEDURE — 99999 PR PBB SHADOW E&M-EST. PATIENT-LVL III: ICD-10-PCS | Mod: PBBFAC,,, | Performed by: SURGERY

## 2020-11-16 PROCEDURE — 75635 CT ANGIO ABDOMINAL ARTERIES: CPT | Mod: TC

## 2020-11-16 PROCEDURE — 99213 OFFICE O/P EST LOW 20 MIN: CPT | Mod: PBBFAC,25 | Performed by: SURGERY

## 2020-11-16 PROCEDURE — 75635 CT ANGIO ABDOMINAL ARTERIES: CPT | Mod: 26,,, | Performed by: RADIOLOGY

## 2020-11-16 PROCEDURE — 93923 PR NON-INVASIVE PHYSIOLOGIC STUDY EXTREMITY 3 LEVELS: ICD-10-PCS | Mod: 26,S$PBB,, | Performed by: SURGERY

## 2020-11-16 RX ORDER — SODIUM CHLORIDE 9 MG/ML
INJECTION, SOLUTION INTRAVENOUS CONTINUOUS
Status: CANCELLED | OUTPATIENT
Start: 2020-11-16

## 2020-11-16 RX ORDER — ONDANSETRON 2 MG/ML
4 INJECTION INTRAMUSCULAR; INTRAVENOUS EVERY 12 HOURS PRN
Status: CANCELLED | OUTPATIENT
Start: 2020-11-16

## 2020-11-16 RX ADMIN — IOHEXOL 125 ML: 350 INJECTION, SOLUTION INTRAVENOUS at 11:11

## 2020-11-16 NOTE — H&P (VIEW-ONLY)
"Tee DOMINGUEZ Nicole  11/16/2020    HPI:  Patient is a 50 y.o. male with a h/o kidney stones who is here today for a follow up evaluation of a nonhealing ulcer. He began having claudication symptoms from his buttocks to his thighs while walking in January 2020 that was sudden in onset. He attributed his symptoms to "being in bad shape" and tried to go on walks to improve his symptoms, but it didn't help. He is unable to walk for less than half a block before the pain begins. In August, he stubbed his toe twice and his toe wasn't able to support any weight anymore. He then developed pain while supine for an hour. He went to the ED and was admitted and was told that he had a clot in his distal aorta. He has a likely embolic ulcer in his left fifth toe. He is not complaining of impotence. He was seen by Dr. Rebollar who performed an angiogram revealing large clot in his aorta. He was start on aspirin and plavix, which he feels has improved his symptoms and he is able to now sleep throughout the night. He was referred here by Dr. Rebollar. On his last visit he was started on Eliquis and scheduled for a CTA and Angiogram of his abdomen/pelvis, and lower extremities. He returns today to review his imaging studies and discuss management options.      Denies MI/stroke  Tobacco use: 1 ppd for 30 years. Stopped in September 2020    Past Medical History:   Diagnosis Date    Kidney stone      Past Surgical History:   Procedure Laterality Date    AORTOGRAPHY WITH SERIALOGRAPHY N/A 11/4/2020    Procedure: AORTOGRAM, WITH SERIALOGRAPHY;  Surgeon: Luis Rebollar III, MD;  Location: [facility] CATH LAB;  Service: Cardiology;  Laterality: N/A;     Family History   Problem Relation Age of Onset    Stroke Mother     Stroke Father     No Known Problems Sister      Social History     Socioeconomic History    Marital status: Single     Spouse name: Not on file    Number of children: Not on file    Years of education: Not on file    " Highest education level: Not on file   Occupational History    Not on file   Social Needs    Financial resource strain: Not on file    Food insecurity     Worry: Not on file     Inability: Not on file    Transportation needs     Medical: Not on file     Non-medical: Not on file   Tobacco Use    Smoking status: Former Smoker     Packs/day: 1.00     Types: Cigarettes    Smokeless tobacco: Never Used   Substance and Sexual Activity    Alcohol use: Yes     Comment: occasional    Drug use: No    Sexual activity: Not Currently   Lifestyle    Physical activity     Days per week: Not on file     Minutes per session: Not on file    Stress: Not on file   Relationships    Social connections     Talks on phone: Not on file     Gets together: Not on file     Attends Jewish service: Not on file     Active member of club or organization: Not on file     Attends meetings of clubs or organizations: Not on file     Relationship status: Not on file   Other Topics Concern    Not on file   Social History Narrative    Not on file       Current Outpatient Medications:     apixaban (ELIQUIS) 5 mg Tab, Take 1 tablet (5 mg total) by mouth 2 (two) times daily., Disp: 60 tablet, Rfl: 2    aspirin 81 MG Chew, Take 1 tablet (81 mg total) by mouth once daily., Disp: 30 tablet, Rfl: 11    atorvastatin (LIPITOR) 80 MG tablet, Take 1 tablet (80 mg total) by mouth every evening., Disp: 90 tablet, Rfl: 3    clopidogreL (PLAVIX) 75 mg tablet, Take 1 tablet (75 mg total) by mouth once daily., Disp: 30 tablet, Rfl: 11    HYDROcodone-acetaminophen (NORCO) 5-325 mg per tablet, Take 1 tablet by mouth every 12 (twelve) hours as needed for Pain., Disp: 20 tablet, Rfl: 0    HYDROcodone-acetaminophen (NORCO) 7.5-325 mg per tablet, Take 1 tablet by mouth every 12 (twelve) hours as needed for Pain., Disp: 15 tablet, Rfl: 0    ondansetron (ZOFRAN) 8 MG tablet, Take 1 tablet (8 mg total) by mouth every 8 (eight) hours as needed for Nausea.,  Disp: 15 tablet, Rfl: 0    tamsulosin (FLOMAX) 0.4 mg Cap, Take 1 capsule (0.4 mg total) by mouth once daily., Disp: 10 capsule, Rfl: 0  No current facility-administered medications for this visit.     REVIEW OF SYSTEMS:  General: negative; ENT: negative; Allergy and Immunology: negative; Hematological and Lymphatic: Negative; Endocrine: negative; Respiratory: no cough, shortness of breath, or wheezing; Cardiovascular: no chest pain or dyspnea on exertion; Gastrointestinal: no abdominal pain/back, change in bowel habits, or bloody stools; Genito-Urinary: no dysuria, trouble voiding, or hematuria; Musculoskeletal: negative  Neurological: no TIA or stroke symptoms; Psychiatric: no nervousness, anxiety or depression.    PHYSICAL EXAM:   Right Arm BP - Sittin/80 (20 1351)  Left Arm BP - Sittin/78 (20 1351)  Pulse: 98  Temp: 99.4 °F (37.4 °C)      General appearance:  Alert, well-appearing, and in no distress.  Oriented to person, place, and time   Neurological: Normal speech, no focal findings noted; CN II - XII grossly intact           Musculoskeletal: Digits/nail without cyanosis/clubbing.  Normal muscle strength/tone.                 Neck: Supple, no significant adenopathy; thyroid is not enlarged                Chest:  Clear to auscultation, no wheezes, rales or rhonchi, symmetric air entry      No use of accessory muscles             Cardiac: Normal rate and regular rhythm, S1 and S2 normal; PMI non-displaced          Abdomen: Soft, nontender, nondistended, no masses or organomegaly      No rebound tenderness noted; bowel sounds normal      Pulsatile aortic mass is not palpable.      No groin adenopathy      Extremities:   1+ femoral pulse on left, no femoral pulse on right      Not palpable pedal pulses      No pre-tibial edema      Ulcer on the plantar aspect of the fifth toe. Looks embolic in origin. Healing, has scab. Improved from last visit.    LAB RESULTS:  Lab Results   Component  Value Date    K 4.0 10/30/2020    K 4.5 10/04/2020    K 4.2 10/04/2020    CREATININE 0.97 10/30/2020    CREATININE 1.0 10/04/2020    CREATININE 1.0 10/04/2020     Lab Results   Component Value Date    WBC 10.51 10/30/2020    WBC 10.88 10/04/2020    WBC 11.54 10/04/2020    HCT 48.1 10/30/2020    HCT 46.3 10/04/2020    HCT 42.5 10/04/2020     10/30/2020     10/04/2020     10/04/2020     Lab Results   Component Value Date    HGBA1C 5.6 10/04/2020     IMAGING:  Angiogram (10/22/2020): partially occluded aorta with collateral flow  CTA abd/pel/LE w/ runoff (11/16/2020): 80-95% occlusion of the infrarenal aorta which extends into the common iliacs bilaterally.   FIGUEROA:  Right: 0.76  Left 0.84    IMP/PLAN:  50 y.o. male with an occlusive thrombus in his aorta.    - Scheduled for percutaneous bilateral iliac  To aorta covered stent placement  - Scheduled for repeat CTA w/ runoff in 3 weeks  - Return to clinic in 3 weeks with imaging  - Continue Plavix and Eliquis 5 bid  - Following with Cardiology    Ruben Carcamo MD  [facility]

## 2020-11-16 NOTE — PROGRESS NOTES
"Tee DOMINGUEZ Nicole  11/16/2020    HPI:  Patient is a 50 y.o. male with a h/o kidney stones who is here today for a follow up evaluation of a nonhealing ulcer. He began having claudication symptoms from his buttocks to his thighs while walking in January 2020 that was sudden in onset. He attributed his symptoms to "being in bad shape" and tried to go on walks to improve his symptoms, but it didn't help. He is unable to walk for less than half a block before the pain begins. In August, he stubbed his toe twice and his toe wasn't able to support any weight anymore. He then developed pain while supine for an hour. He went to the ED and was admitted and was told that he had a clot in his distal aorta. He has a likely embolic ulcer in his left fifth toe. He is not complaining of impotence. He was seen by Dr. Rebollar who performed an angiogram revealing large clot in his aorta. He was start on aspirin and plavix, which he feels has improved his symptoms and he is able to now sleep throughout the night. He was referred here by Dr. Rebollar. On his last visit he was started on Eliquis and scheduled for a CTA and Angiogram of his abdomen/pelvis, and lower extremities. He returns today to review his imaging studies and discuss management options.      Denies MI/stroke  Tobacco use: 1 ppd for 30 years. Stopped in September 2020    Past Medical History:   Diagnosis Date    Kidney stone      Past Surgical History:   Procedure Laterality Date    AORTOGRAPHY WITH SERIALOGRAPHY N/A 11/4/2020    Procedure: AORTOGRAM, WITH SERIALOGRAPHY;  Surgeon: Luis Rebollar III, MD;  Location: [facility] CATH LAB;  Service: Cardiology;  Laterality: N/A;     Family History   Problem Relation Age of Onset    Stroke Mother     Stroke Father     No Known Problems Sister      Social History     Socioeconomic History    Marital status: Single     Spouse name: Not on file    Number of children: Not on file    Years of education: Not on file    " Highest education level: Not on file   Occupational History    Not on file   Social Needs    Financial resource strain: Not on file    Food insecurity     Worry: Not on file     Inability: Not on file    Transportation needs     Medical: Not on file     Non-medical: Not on file   Tobacco Use    Smoking status: Former Smoker     Packs/day: 1.00     Types: Cigarettes    Smokeless tobacco: Never Used   Substance and Sexual Activity    Alcohol use: Yes     Comment: occasional    Drug use: No    Sexual activity: Not Currently   Lifestyle    Physical activity     Days per week: Not on file     Minutes per session: Not on file    Stress: Not on file   Relationships    Social connections     Talks on phone: Not on file     Gets together: Not on file     Attends [employer] service: Not on file     Active member of club or organization: Not on file     Attends meetings of clubs or organizations: Not on file     Relationship status: Not on file   Other Topics Concern    Not on file   Social History Narrative    Not on file       Current Outpatient Medications:     apixaban (ELIQUIS) 5 mg Tab, Take 1 tablet (5 mg total) by mouth 2 (two) times daily., Disp: 60 tablet, Rfl: 2    aspirin 81 MG Chew, Take 1 tablet (81 mg total) by mouth once daily., Disp: 30 tablet, Rfl: 11    atorvastatin (LIPITOR) 80 MG tablet, Take 1 tablet (80 mg total) by mouth every evening., Disp: 90 tablet, Rfl: 3    clopidogreL (PLAVIX) 75 mg tablet, Take 1 tablet (75 mg total) by mouth once daily., Disp: 30 tablet, Rfl: 11    HYDROcodone-acetaminophen (NORCO) 5-325 mg per tablet, Take 1 tablet by mouth every 12 (twelve) hours as needed for Pain., Disp: 20 tablet, Rfl: 0    HYDROcodone-acetaminophen (NORCO) 7.5-325 mg per tablet, Take 1 tablet by mouth every 12 (twelve) hours as needed for Pain., Disp: 15 tablet, Rfl: 0    ondansetron (ZOFRAN) 8 MG tablet, Take 1 tablet (8 mg total) by mouth every 8 (eight) hours as needed for Nausea.,  Disp: 15 tablet, Rfl: 0    tamsulosin (FLOMAX) 0.4 mg Cap, Take 1 capsule (0.4 mg total) by mouth once daily., Disp: 10 capsule, Rfl: 0  No current facility-administered medications for this visit.     REVIEW OF SYSTEMS:  General: negative; ENT: negative; Allergy and Immunology: negative; Hematological and Lymphatic: Negative; Endocrine: negative; Respiratory: no cough, shortness of breath, or wheezing; Cardiovascular: no chest pain or dyspnea on exertion; Gastrointestinal: no abdominal pain/back, change in bowel habits, or bloody stools; Genito-Urinary: no dysuria, trouble voiding, or hematuria; Musculoskeletal: negative  Neurological: no TIA or stroke symptoms; Psychiatric: no nervousness, anxiety or depression.    PHYSICAL EXAM:   Right Arm BP - Sittin/80 (20 1351)  Left Arm BP - Sittin/78 (20 1351)  Pulse: 98  Temp: 99.4 °F (37.4 °C)      General appearance:  Alert, well-appearing, and in no distress.  Oriented to person, place, and time   Neurological: Normal speech, no focal findings noted; CN II - XII grossly intact           Musculoskeletal: Digits/nail without cyanosis/clubbing.  Normal muscle strength/tone.                 Neck: Supple, no significant adenopathy; thyroid is not enlarged                Chest:  Clear to auscultation, no wheezes, rales or rhonchi, symmetric air entry      No use of accessory muscles             Cardiac: Normal rate and regular rhythm, S1 and S2 normal; PMI non-displaced          Abdomen: Soft, nontender, nondistended, no masses or organomegaly      No rebound tenderness noted; bowel sounds normal      Pulsatile aortic mass is not palpable.      No groin adenopathy      Extremities:   1+ femoral pulse on left, no femoral pulse on right      Not palpable pedal pulses      No pre-tibial edema      Ulcer on the plantar aspect of the fifth toe. Looks embolic in origin. Healing, has scab. Improved from last visit.    LAB RESULTS:  Lab Results   Component  Value Date    K 4.0 10/30/2020    K 4.5 10/04/2020    K 4.2 10/04/2020    CREATININE 0.97 10/30/2020    CREATININE 1.0 10/04/2020    CREATININE 1.0 10/04/2020     Lab Results   Component Value Date    WBC 10.51 10/30/2020    WBC 10.88 10/04/2020    WBC 11.54 10/04/2020    HCT 48.1 10/30/2020    HCT 46.3 10/04/2020    HCT 42.5 10/04/2020     10/30/2020     10/04/2020     10/04/2020     Lab Results   Component Value Date    HGBA1C 5.6 10/04/2020     IMAGING:  Angiogram (10/22/2020): partially occluded aorta with collateral flow  CTA abd/pel/LE w/ runoff (11/16/2020): 80-95% occlusion of the infrarenal aorta which extends into the common iliacs bilaterally.   FIGUEROA:  Right: 0.76  Left 0.84    IMP/PLAN:  50 y.o. male with an occlusive thrombus in his aorta.    - Scheduled for percutaneous bilateral iliac  To aorta covered stent placement  - Scheduled for repeat CTA w/ runoff in 3 weeks  - Return to clinic in 3 weeks with imaging  - Continue Plavix and Eliquis 5 bid  - Following with Cardiology    Ruben Carcamo MD  [facility]

## 2020-11-17 ENCOUNTER — PATIENT MESSAGE (OUTPATIENT)
Dept: VASCULAR SURGERY | Facility: CLINIC | Age: 50
End: 2020-11-17

## 2020-11-20 ENCOUNTER — DOCUMENTATION ONLY (OUTPATIENT)
Dept: VASCULAR SURGERY | Facility: CLINIC | Age: 50
End: 2020-11-20

## 2020-11-20 ENCOUNTER — TELEPHONE (OUTPATIENT)
Dept: VASCULAR SURGERY | Facility: CLINIC | Age: 50
End: 2020-11-20

## 2020-11-20 NOTE — TELEPHONE ENCOUNTER
Contacted patient in response to message. Pt states he has been trying to walk as instructed by Dr. Garcia  ----- Message from Adilene Salazar sent at 11/20/2020  9:15 AM CST -----  Regarding: pt  Pt is calling to speak with the nurse about having a blockage artery in his leg pt said he will further discuss when the nurse calls him back can you please call pt at [phone]. Pt said he didn't need to be seen today but will like to talk to the nurse     SARAH

## 2020-11-20 NOTE — PROGRESS NOTES
Pt states he has been trying to walk as instructed by Dr. Garcia, but that it is very painful and that his foot begins to swell when he does walk. States he would like to know if he can have appt with CTA at a sooner date and then plan for intervention at a sooner date. After discussing with Dr. Garcia notified patient that nurse can reschedule clinic appt and CTA for Monday 11/23/20 and that Dr. Garcia may be able to operate on 12/4/20 or 12/7/20. Pt states these dates are fine with him. Notified patient that nurse will discuss with Dr. Garcia and will call patient with a plan. Pt verbalized understanding.

## 2020-11-23 ENCOUNTER — OFFICE VISIT (OUTPATIENT)
Dept: VASCULAR SURGERY | Facility: CLINIC | Age: 50
End: 2020-11-23
Payer: MEDICAID

## 2020-11-23 ENCOUNTER — HOSPITAL ENCOUNTER (OUTPATIENT)
Dept: RADIOLOGY | Facility: HOSPITAL | Age: 50
Discharge: HOME | End: 2020-11-23
Attending: SURGERY
Payer: MEDICAID

## 2020-11-23 VITALS
SYSTOLIC BLOOD PRESSURE: 114 MMHG | WEIGHT: 200.38 LBS | DIASTOLIC BLOOD PRESSURE: 68 MMHG | TEMPERATURE: 98 F | RESPIRATION RATE: 20 BRPM | BODY MASS INDEX: 27.14 KG/M2 | HEART RATE: 87 BPM | HEIGHT: 72 IN

## 2020-11-23 DIAGNOSIS — I73.9 PERIPHERAL VASCULAR DISEASE, UNSPECIFIED: Primary | ICD-10-CM

## 2020-11-23 DIAGNOSIS — I73.9 PERIPHERAL VASCULAR DISEASE, UNSPECIFIED: ICD-10-CM

## 2020-11-23 PROCEDURE — 75635 CTA RUNOFF ABD PEL BILAT LOWER EXT: ICD-10-PCS | Mod: 26,,, | Performed by: RADIOLOGY

## 2020-11-23 PROCEDURE — 99213 OFFICE O/P EST LOW 20 MIN: CPT | Mod: PBBFAC,25 | Performed by: SURGERY

## 2020-11-23 PROCEDURE — 99212 PR OFFICE/OUTPT VISIT, EST, LEVL II, 10-19 MIN: ICD-10-PCS | Mod: S$PBB,,, | Performed by: SURGERY

## 2020-11-23 PROCEDURE — 99999 PR PBB SHADOW E&M-EST. PATIENT-LVL III: CPT | Mod: PBBFAC,,, | Performed by: SURGERY

## 2020-11-23 PROCEDURE — 99999 PR PBB SHADOW E&M-EST. PATIENT-LVL III: ICD-10-PCS | Mod: PBBFAC,,, | Performed by: SURGERY

## 2020-11-23 PROCEDURE — 75635 CT ANGIO ABDOMINAL ARTERIES: CPT | Mod: 26,,, | Performed by: RADIOLOGY

## 2020-11-23 PROCEDURE — 25500020 PHARM REV CODE 255: Performed by: SURGERY

## 2020-11-23 PROCEDURE — 75635 CT ANGIO ABDOMINAL ARTERIES: CPT | Mod: TC

## 2020-11-23 PROCEDURE — 99212 OFFICE O/P EST SF 10 MIN: CPT | Mod: S$PBB,,, | Performed by: SURGERY

## 2020-11-23 RX ADMIN — IOHEXOL 125 ML: 350 INJECTION, SOLUTION INTRAVENOUS at 09:11

## 2020-11-23 NOTE — PROGRESS NOTES
Patient returns to clinic.  He is really having trouble walking.  His legs really hurting.  He is very frustrated.  The ulcer on his little toe is improved.  About a 2-3 mm circular area with a scab on it.  His CT scan angiogram of his runoff was reviewed and is absolutely stable.  Will plan the procedure for the 4th of December.  We will have him stop his Eliquis 2 days before remain on the Plavix.  He understands the procedure.  We will try to stand up to right below his JAVIER.  All of his questions were answered  Meds reviewed  History reviewed   Patient consented

## 2020-12-01 ENCOUNTER — LAB VISIT (OUTPATIENT)
Dept: URGENT CARE | Facility: CLINIC | Age: 50
End: 2020-12-01
Payer: MEDICAID

## 2020-12-01 DIAGNOSIS — Z01.818 PRE-OP EVALUATION: ICD-10-CM

## 2020-12-01 PROCEDURE — U0003 INFECTIOUS AGENT DETECTION BY NUCLEIC ACID (DNA OR RNA); SEVERE ACUTE RESPIRATORY SYNDROME CORONAVIRUS 2 (SARS-COV-2) (CORONAVIRUS DISEASE [COVID-19]), AMPLIFIED PROBE TECHNIQUE, MAKING USE OF HIGH THROUGHPUT TECHNOLOGIES AS DESCRIBED BY CMS-2020-01-R: HCPCS

## 2020-12-02 ENCOUNTER — TELEPHONE (OUTPATIENT)
Dept: VASCULAR SURGERY | Facility: CLINIC | Age: 50
End: 2020-12-02

## 2020-12-02 NOTE — TELEPHONE ENCOUNTER
----- Message from Ashleigh Rust sent at 12/2/2020 12:28 PM CST -----  Regarding: Patient asks to speak with Arielle  Please call Mr. Nicole at [phone].    Thanks, Ashleigh

## 2020-12-02 NOTE — TELEPHONE ENCOUNTER
Attempted to contact patient to discuss rescheduling procedure with Dr. Garcia to 12/7/20 due to a change in Dr. Garcia's schedule. Message left for patient requesting return call.

## 2020-12-03 DIAGNOSIS — Z01.818 PRE-OP EVALUATION: Primary | ICD-10-CM

## 2020-12-03 LAB — SARS-COV-2 RNA RESP QL NAA+PROBE: NOT DETECTED

## 2020-12-03 NOTE — TELEPHONE ENCOUNTER
Contacted patient in response to message. Notified patient that due to change in Dr. Garcia's schedule he will have to reschedule pt's procedure to Monday 12/7/20 and that patient will have to repeat COVID-19 test. Pt verbalized understanding. COVID-19 testing scheduled, pt verified. States he has been holding his Eliquis since Monday. After discussing with Dr. Garcia notified patient that he should take today's dose of Eliquis and hold it 12/4/20 - 12/7/20. Pt verbalized understanding and repeated instructions to nurse.  ----- Message from Ashleigh Rust sent at 12/3/2020  8:31 AM CST -----  Regarding: Missed your call  Please call Mr. Nicole at [phone].    Ashleigh

## 2020-12-04 ENCOUNTER — LAB VISIT (OUTPATIENT)
Dept: INTERNAL MEDICINE | Facility: CLINIC | Age: 50
End: 2020-12-04
Payer: MEDICAID

## 2020-12-04 ENCOUNTER — TELEPHONE (OUTPATIENT)
Dept: VASCULAR SURGERY | Facility: CLINIC | Age: 50
End: 2020-12-04

## 2020-12-04 DIAGNOSIS — Z01.818 PRE-OP EVALUATION: ICD-10-CM

## 2020-12-04 PROCEDURE — U0003 INFECTIOUS AGENT DETECTION BY NUCLEIC ACID (DNA OR RNA); SEVERE ACUTE RESPIRATORY SYNDROME CORONAVIRUS 2 (SARS-COV-2) (CORONAVIRUS DISEASE [COVID-19]), AMPLIFIED PROBE TECHNIQUE, MAKING USE OF HIGH THROUGHPUT TECHNOLOGIES AS DESCRIBED BY CMS-2020-01-R: HCPCS

## 2020-12-04 NOTE — PRE-PROCEDURE INSTRUCTIONS
PREOP INSTRUCTIONS:No solid food ,milk or milk products for 8 hours prior to procedure.Clear liquids are allowed up to 2 hours before procedure.Clear liquids are:water,apple juice,gatorade & powerade.Instructed to follow the surgeon's instructions if they differ from these.Shower instructions as well as directions to the Surgery Center were given.Encouraged to wear loose fitting,comfortable clothing.Medication instructions for pm prior to and am of procedure reviewed.Instructed to avoid taking vitamins,supplements,aspirin and ibuprofen the morning of surgery. Patient's questions and concerns addressed .Patient informed of the current visitor policy and advised patient that one visitor may accompany each patient into the hospital and wait (socially distanced) until a member of the medical team provides an update at the conclusion of the procedure.When they enter the hospital both patient and visitor will have their temperature checked.All visitors are asked to arrive with a mask and to keep their mask on throughout the visit.      Patient denies any side effects or issues with anesthesia or sedation.      Patient does not know arrival time.Explained that this information comes from the surgeon's office and if they haven't heard from them by 2 or 3 pm to call the office.Patient stated an understanding.     SON - BERNARD OR SISTER - SYLWIA BANEGAS    CALL SENT TO CLAY BRADFORD RN FOR AN ARRIVAL TIME.

## 2020-12-05 LAB — SARS-COV-2 RNA RESP QL NAA+PROBE: NOT DETECTED

## 2020-12-06 ENCOUNTER — ANESTHESIA EVENT (OUTPATIENT)
Dept: SURGERY | Facility: HOSPITAL | Age: 50
DRG: 254 | End: 2020-12-06
Payer: MEDICAID

## 2020-12-06 NOTE — ANESTHESIA PREPROCEDURE EVALUATION
12/06/2020  Tee Nicole is a 50 y.o., male.    Anesthesia Evaluation    I have reviewed the Patient Summary Reports.    I have reviewed the Nursing Notes. I have reviewed the NPO Status.   I have reviewed the Medications.     Review of Systems  Anesthesia Hx:  No problems with previous Anesthesia  History of prior surgery of interest to airway management or planning: Denies Family Hx of Anesthesia complications.   Denies Personal Hx of Anesthesia complications.   Hematology/Oncology:  Hematology Normal   Oncology Normal     EENT/Dental:EENT/Dental Normal   Cardiovascular:   Exercise tolerance: good PVD ECG has been reviewed.    Pulmonary:  Pulmonary Normal    Renal/:   renal calculi    Hepatic/GI:  Hepatic/GI Normal    Musculoskeletal:  Musculoskeletal Normal    Neurological:  Neurology Normal    Endocrine:  Endocrine Normal    Dermatological:  Skin Normal    Psych:  Psychiatric Normal           Physical Exam  General:  Well nourished    Airway/Jaw/Neck:  Airway Findings: Mouth Opening: Normal Tongue: Normal  General Airway Assessment: Adult  Mallampati: II  TM Distance: Normal, at least 6 cm        Eyes/Ears/Nose:  EYES/EARS/NOSE FINDINGS: Normal   Dental:  DENTAL FINDINGS: Normal   Chest/Lungs:  Chest/Lungs Clear    Heart/Vascular:  Heart Findings: Normal Heart murmur: negative Vascular Findings: Normal    Abdomen:  Abdomen Findings: Normal    Musculoskeletal:  Musculoskeletal Findings: Normal   Skin:  Skin Findings: Normal    Mental Status:  Mental Status Findings: Normal        Anesthesia Plan  Type of Anesthesia, risks & benefits discussed:  Anesthesia Type:  general, MAC  Patient's Preference:   Intra-op Monitoring Plan: standard ASA monitors and arterial line  Intra-op Monitoring Plan Comments:   Post Op Pain Control Plan: per primary service following discharge from PACU, IV/PO Opioids PRN  and multimodal analgesia  Post Op Pain Control Plan Comments:   Induction:   IV  Beta Blocker:  Patient is not currently on a Beta-Blocker (No further documentation required).       Informed Consent: Patient understands risks and agrees with Anesthesia plan.  Questions answered. Anesthesia consent signed with patient.  ASA Score: 3     Day of Surgery Review of History & Physical:    H&P update referred to the surgeon.         Ready For Surgery From Anesthesia Perspective.

## 2020-12-07 ENCOUNTER — HOSPITAL ENCOUNTER (INPATIENT)
Facility: HOSPITAL | Age: 50
LOS: 1 days | Discharge: HOME | DRG: 254 | End: 2020-12-07
Attending: SURGERY | Admitting: SURGERY
Payer: MEDICAID

## 2020-12-07 ENCOUNTER — ANESTHESIA (OUTPATIENT)
Dept: SURGERY | Facility: HOSPITAL | Age: 50
DRG: 254 | End: 2020-12-07
Payer: MEDICAID

## 2020-12-07 VITALS
HEIGHT: 72 IN | DIASTOLIC BLOOD PRESSURE: 80 MMHG | RESPIRATION RATE: 18 BRPM | HEART RATE: 70 BPM | BODY MASS INDEX: 27.09 KG/M2 | WEIGHT: 200 LBS | OXYGEN SATURATION: 96 % | TEMPERATURE: 98 F | SYSTOLIC BLOOD PRESSURE: 120 MMHG

## 2020-12-07 DIAGNOSIS — I74.09 LERICHE SYNDROME: ICD-10-CM

## 2020-12-07 DIAGNOSIS — I70.0 AORTIC OCCLUSION: ICD-10-CM

## 2020-12-07 PROCEDURE — 71000015 HC POSTOP RECOV 1ST HR: Performed by: SURGERY

## 2020-12-07 PROCEDURE — 25500020 PHARM REV CODE 255: Performed by: SURGERY

## 2020-12-07 PROCEDURE — 36000707: Performed by: SURGERY

## 2020-12-07 PROCEDURE — 25000003 PHARM REV CODE 250: Performed by: STUDENT IN AN ORGANIZED HEALTH CARE EDUCATION/TRAINING PROGRAM

## 2020-12-07 PROCEDURE — D9220A PRA ANESTHESIA: Mod: ANES,,, | Performed by: ANESTHESIOLOGY

## 2020-12-07 PROCEDURE — C1876 STENT, NON-COA/NON-COV W/DEL: HCPCS | Performed by: SURGERY

## 2020-12-07 PROCEDURE — 63600175 PHARM REV CODE 636 W HCPCS: Performed by: NURSE ANESTHETIST, CERTIFIED REGISTERED

## 2020-12-07 PROCEDURE — 63600175 PHARM REV CODE 636 W HCPCS: Performed by: SURGERY

## 2020-12-07 PROCEDURE — 37000008 HC ANESTHESIA 1ST 15 MINUTES: Performed by: SURGERY

## 2020-12-07 PROCEDURE — C1760 CLOSURE DEV, VASC: HCPCS | Performed by: SURGERY

## 2020-12-07 PROCEDURE — D9220A PRA ANESTHESIA: ICD-10-PCS | Mod: ANES,,, | Performed by: ANESTHESIOLOGY

## 2020-12-07 PROCEDURE — C1874 STENT, COATED/COV W/DEL SYS: HCPCS | Performed by: SURGERY

## 2020-12-07 PROCEDURE — D9220A PRA ANESTHESIA: ICD-10-PCS | Mod: CRNA,,, | Performed by: NURSE ANESTHETIST, CERTIFIED REGISTERED

## 2020-12-07 PROCEDURE — 37221 PR REVASCULARIZE ILIAC ARTERY,ANGIOPLASTY/STENT, INITIAL VESSEL: CPT | Mod: 50,,, | Performed by: SURGERY

## 2020-12-07 PROCEDURE — 94761 N-INVAS EAR/PLS OXIMETRY MLT: CPT

## 2020-12-07 PROCEDURE — 37000009 HC ANESTHESIA EA ADD 15 MINS: Performed by: SURGERY

## 2020-12-07 PROCEDURE — C1887 CATHETER, GUIDING: HCPCS | Performed by: SURGERY

## 2020-12-07 PROCEDURE — 36000706: Performed by: SURGERY

## 2020-12-07 PROCEDURE — 27201423 OPTIME MED/SURG SUP & DEVICES STERILE SUPPLY: Performed by: SURGERY

## 2020-12-07 PROCEDURE — 12000002 HC ACUTE/MED SURGE SEMI-PRIVATE ROOM

## 2020-12-07 PROCEDURE — 25000003 PHARM REV CODE 250: Performed by: NURSE ANESTHETIST, CERTIFIED REGISTERED

## 2020-12-07 PROCEDURE — 71000016 HC POSTOP RECOV ADDL HR: Performed by: SURGERY

## 2020-12-07 PROCEDURE — C1769 GUIDE WIRE: HCPCS | Performed by: SURGERY

## 2020-12-07 PROCEDURE — 37221 PR REVASCULARIZE ILIAC ARTERY,ANGIOPLASTY/STENT, INITIAL VESSEL: ICD-10-PCS | Mod: 50,,, | Performed by: SURGERY

## 2020-12-07 PROCEDURE — D9220A PRA ANESTHESIA: Mod: CRNA,,, | Performed by: NURSE ANESTHETIST, CERTIFIED REGISTERED

## 2020-12-07 PROCEDURE — C1894 INTRO/SHEATH, NON-LASER: HCPCS | Performed by: SURGERY

## 2020-12-07 PROCEDURE — 71000033 HC RECOVERY, INTIAL HOUR: Performed by: SURGERY

## 2020-12-07 PROCEDURE — 25000003 PHARM REV CODE 250: Performed by: SURGERY

## 2020-12-07 DEVICE — IMPLANTABLE DEVICE: Type: IMPLANTABLE DEVICE | Site: OTHER (ADD COMMENT) | Status: FUNCTIONAL

## 2020-12-07 DEVICE — IMPLANTABLE DEVICE: Type: IMPLANTABLE DEVICE | Site: AORTA | Status: FUNCTIONAL

## 2020-12-07 RX ORDER — PROTAMINE SULFATE 10 MG/ML
INJECTION, SOLUTION INTRAVENOUS
Status: DISCONTINUED | OUTPATIENT
Start: 2020-12-07 | End: 2020-12-07

## 2020-12-07 RX ORDER — LIDOCAINE HYDROCHLORIDE 10 MG/ML
INJECTION, SOLUTION EPIDURAL; INFILTRATION; INTRACAUDAL; PERINEURAL
Status: DISCONTINUED | OUTPATIENT
Start: 2020-12-07 | End: 2020-12-07 | Stop reason: HOSPADM

## 2020-12-07 RX ORDER — PHENYLEPHRINE HCL IN 0.9% NACL 1 MG/10 ML
SYRINGE (ML) INTRAVENOUS
Status: DISCONTINUED | OUTPATIENT
Start: 2020-12-07 | End: 2020-12-07

## 2020-12-07 RX ORDER — FENTANYL CITRATE 50 UG/ML
25 INJECTION, SOLUTION INTRAMUSCULAR; INTRAVENOUS EVERY 5 MIN PRN
Status: DISCONTINUED | OUTPATIENT
Start: 2020-12-07 | End: 2020-12-07 | Stop reason: HOSPADM

## 2020-12-07 RX ORDER — ONDANSETRON 2 MG/ML
4 INJECTION INTRAMUSCULAR; INTRAVENOUS EVERY 12 HOURS PRN
Status: DISCONTINUED | OUTPATIENT
Start: 2020-12-07 | End: 2020-12-07 | Stop reason: HOSPADM

## 2020-12-07 RX ORDER — HEPARIN SODIUM 1000 [USP'U]/ML
INJECTION, SOLUTION INTRAVENOUS; SUBCUTANEOUS
Status: DISCONTINUED | OUTPATIENT
Start: 2020-12-07 | End: 2020-12-07

## 2020-12-07 RX ORDER — FENTANYL CITRATE 50 UG/ML
INJECTION, SOLUTION INTRAMUSCULAR; INTRAVENOUS
Status: DISCONTINUED | OUTPATIENT
Start: 2020-12-07 | End: 2020-12-07

## 2020-12-07 RX ORDER — HYDROCODONE BITARTRATE AND ACETAMINOPHEN 5; 325 MG/1; MG/1
1 TABLET ORAL EVERY 6 HOURS PRN
Status: DISCONTINUED | OUTPATIENT
Start: 2020-12-07 | End: 2020-12-07 | Stop reason: HOSPADM

## 2020-12-07 RX ORDER — IODIXANOL 320 MG/ML
INJECTION, SOLUTION INTRAVASCULAR
Status: DISCONTINUED | OUTPATIENT
Start: 2020-12-07 | End: 2020-12-07 | Stop reason: HOSPADM

## 2020-12-07 RX ORDER — LIDOCAINE HYDROCHLORIDE 20 MG/ML
INJECTION, SOLUTION EPIDURAL; INFILTRATION; INTRACAUDAL; PERINEURAL
Status: DISCONTINUED | OUTPATIENT
Start: 2020-12-07 | End: 2020-12-07

## 2020-12-07 RX ORDER — CLINDAMYCIN PHOSPHATE 900 MG/50ML
900 INJECTION, SOLUTION INTRAVENOUS
Status: COMPLETED | OUTPATIENT
Start: 2020-12-07 | End: 2020-12-07

## 2020-12-07 RX ORDER — MIDAZOLAM HYDROCHLORIDE 1 MG/ML
INJECTION, SOLUTION INTRAMUSCULAR; INTRAVENOUS
Status: DISCONTINUED | OUTPATIENT
Start: 2020-12-07 | End: 2020-12-07

## 2020-12-07 RX ORDER — SODIUM CHLORIDE 0.9 % (FLUSH) 0.9 %
3 SYRINGE (ML) INJECTION
Status: DISCONTINUED | OUTPATIENT
Start: 2020-12-07 | End: 2020-12-07 | Stop reason: HOSPADM

## 2020-12-07 RX ORDER — SODIUM CHLORIDE 9 MG/ML
INJECTION, SOLUTION INTRAVENOUS CONTINUOUS
Status: DISCONTINUED | OUTPATIENT
Start: 2020-12-07 | End: 2020-12-07 | Stop reason: HOSPADM

## 2020-12-07 RX ORDER — HYDROCODONE BITARTRATE AND ACETAMINOPHEN 5; 325 MG/1; MG/1
1 TABLET ORAL EVERY 6 HOURS PRN
Qty: 15 TABLET | Refills: 0 | Status: SHIPPED | OUTPATIENT
Start: 2020-12-07 | End: 2020-12-10 | Stop reason: SDUPTHER

## 2020-12-07 RX ORDER — PROPOFOL 10 MG/ML
VIAL (ML) INTRAVENOUS
Status: DISCONTINUED | OUTPATIENT
Start: 2020-12-07 | End: 2020-12-07

## 2020-12-07 RX ORDER — PROPOFOL 10 MG/ML
VIAL (ML) INTRAVENOUS CONTINUOUS PRN
Status: DISCONTINUED | OUTPATIENT
Start: 2020-12-07 | End: 2020-12-07

## 2020-12-07 RX ORDER — HEPARIN SODIUM 1000 [USP'U]/ML
INJECTION, SOLUTION INTRAVENOUS; SUBCUTANEOUS
Status: DISCONTINUED | OUTPATIENT
Start: 2020-12-07 | End: 2020-12-07 | Stop reason: HOSPADM

## 2020-12-07 RX ORDER — PHENYLEPHRINE HYDROCHLORIDE 10 MG/ML
INJECTION INTRAVENOUS
Status: DISCONTINUED | OUTPATIENT
Start: 2020-12-07 | End: 2020-12-07

## 2020-12-07 RX ADMIN — PROPOFOL 20 MG: 10 INJECTION, EMULSION INTRAVENOUS at 08:12

## 2020-12-07 RX ADMIN — MIDAZOLAM 1 MG: 1 INJECTION INTRAMUSCULAR; INTRAVENOUS at 07:12

## 2020-12-07 RX ADMIN — PHENYLEPHRINE HYDROCHLORIDE 100 MCG: 10 INJECTION INTRAVENOUS at 08:12

## 2020-12-07 RX ADMIN — FENTANYL CITRATE 50 MCG: 50 INJECTION, SOLUTION INTRAMUSCULAR; INTRAVENOUS at 07:12

## 2020-12-07 RX ADMIN — FENTANYL CITRATE 25 MCG: 50 INJECTION, SOLUTION INTRAMUSCULAR; INTRAVENOUS at 07:12

## 2020-12-07 RX ADMIN — PROPOFOL 40 MCG/KG/MIN: 10 INJECTION, EMULSION INTRAVENOUS at 08:12

## 2020-12-07 RX ADMIN — PROTAMINE SULFATE 5 MG: 10 INJECTION, SOLUTION INTRAVENOUS at 08:12

## 2020-12-07 RX ADMIN — CLINDAMYCIN PHOSPHATE 900 MG: 900 INJECTION INTRAVENOUS at 05:12

## 2020-12-07 RX ADMIN — HEPARIN SODIUM 7000 UNITS: 1000 INJECTION, SOLUTION INTRAVENOUS; SUBCUTANEOUS at 08:12

## 2020-12-07 RX ADMIN — PROPOFOL 50 MG: 10 INJECTION, EMULSION INTRAVENOUS at 08:12

## 2020-12-07 RX ADMIN — LIDOCAINE HYDROCHLORIDE 60 MG: 20 INJECTION, SOLUTION EPIDURAL; INFILTRATION; INTRACAUDAL at 08:12

## 2020-12-07 RX ADMIN — PHENYLEPHRINE HYDROCHLORIDE 200 MCG: 10 INJECTION INTRAVENOUS at 08:12

## 2020-12-07 RX ADMIN — PROTAMINE SULFATE 35 MG: 10 INJECTION, SOLUTION INTRAVENOUS at 08:12

## 2020-12-07 RX ADMIN — HYDROCODONE BITARTRATE AND ACETAMINOPHEN 1 TABLET: 5; 325 TABLET ORAL at 12:12

## 2020-12-07 RX ADMIN — PHENYLEPHRINE HYDROCHLORIDE 200 MCG: 10 INJECTION INTRAVENOUS at 09:12

## 2020-12-07 RX ADMIN — SODIUM CHLORIDE 70 ML/HR: 0.9 INJECTION, SOLUTION INTRAVENOUS at 05:12

## 2020-12-07 NOTE — PROGRESS NOTES
Dr. Garcia at bedside assessing patient and pulses. Explained results of procedure to patient. All questions answered.   Patient ok for d/c at 3:30 pm.

## 2020-12-07 NOTE — BRIEF OP NOTE
[facility]  Brief Operative Note    Surgery Date: 12/7/2020     Surgeon(s) and Role:     * Terrance Garcia MD - Primary     * Ben Thomas MD - Resident - Assisting        Pre-op Diagnosis:  Peripheral vascular disease, unspecified [I73.9]    Post-op Diagnosis:  Post-Op Diagnosis Codes:     * Peripheral vascular disease, unspecified [I73.9]    Procedure(s) (LRB):  Angiogram Extremity Unilateral (Bilateral)    Anesthesia: Local MAC    Description of the findings of the procedure(s): Bilateral iliac kissing stents - bilateral 6 mm x 59 mm Ruby VBX, then bilateral 8 mm x 60 mm Bard LifeStents placed more proximally.     Estimated Blood Loss: 20 cc         Specimens:   Specimen (12h ago, onward)    None            Discharge Note    OUTCOME: Patient tolerated treatment/procedure well without complication and is now ready for discharge.    DISPOSITION: Home or Self Care    FINAL DIAGNOSIS:  Aortic occlusion    FOLLOWUP: In clinic    DISCHARGE INSTRUCTIONS:    Discharge Procedure Orders   Diet Adult Regular     Notify your health care provider if you experience any of the following:  temperature >100.4     Notify your health care provider if you experience any of the following:  persistent nausea and vomiting or diarrhea     Notify your health care provider if you experience any of the following:  redness, tenderness, or signs of infection (pain, swelling, redness, odor or green/yellow discharge around incision site)     Notify your health care provider if you experience any of the following:  severe uncontrolled pain     Notify your health care provider if you experience any of the following:  difficulty breathing or increased cough     Notify your health care provider if you experience any of the following:  severe persistent headache     Notify your health care provider if you experience any of the following:  worsening rash     Notify your health care provider if you experience any of the  following:  persistent dizziness, light-headedness, or visual disturbances     Notify your health care provider if you experience any of the following:  increased confusion or weakness     Activity as tolerated        Clinical Reference Documents Added to Patient Instructions       Document    ANGIOGRAPHY, PERIPHERAL (ENGLISH)

## 2020-12-07 NOTE — OP NOTE
This is Dr. Garcia dictating on Mr. Tee Nicole date of the operation is 12/07/2020 preoperative diagnosis ischemia bilaterally nonhealing ulcer right foot aortic and iliac occlusive disease.    assistant was Ben Thomas.     location of the procedure operating room 11     blood loss 20 cc     preoperative and postoperative diagnosis peripheral vascular disease with ischemia     anesthesia monitored anesthesia care with 1% lidocaine     procedures performed bilateral femoral artery ultrasound, bilateral catheters and wires in the aorta PTA and stent aortoiliac segments     description of procedure the patient was placed on the operating room table in the supine position.  Following sedation the groins were prepped and draped in the usual fashion.  Using SonoSite ultrasound guidance a microcatheter was placed in the right femoral artery at this point it was exchanged over a stiff angled glide for a 5 Uzbek sheath.  A Omni Flush catheter was placed in the aorta and aortogram was performed.  It demonstrated an aortic occlusive lesion spilling into the iliacs.  At this point the decision was made that we would perform a PTA and stent.  We then upsized to a 7 Uzbek sheath on the left side we used ultrasound guidance and placed a 7 Uzbek she also.  This was placed over stiff angled glide and Fuller wires.  An aortogram in multiple directions was performed and it showed that the JAVIER was patent and the occlusion.  Right at the JAVIER takeoff.  We decided to use a [place] VBX they were 6 mm x 59 bilaterally and they were deployed after 7000 units of heparin was given.  Following deployment of these 2 aortogram revealed a more proximal stenosis.  Therefore we decided to use uncovered stents for this reason a Bard LifeStent we used 8 x 60 mm and intussusception then into the [place] graft.  This was done without difficulty.  We decided not to balloon afterwards because of the chance of she has grating thrombus into the  uncovered stent.  Completion angiogram showed good flow.  There is biphasic Doppler flow in both legs.  Mynx closure was used bilaterally.  Unfortunately on the right he had malfunction renal pressure for 15 minutes on the left it worked very well with no issues.  Patient was brought to recovery room with no issues.      This is Dr. Garcia dictating

## 2020-12-07 NOTE — INTERVAL H&P NOTE
The patient has been examined and the H&P has been reviewed:    I concur with the findings and no changes have occurred since H&P was written.    Surgery risks, benefits and alternative options discussed and understood by patient/family.          Active Hospital Problems    Diagnosis  POA    Aortic occlusion [I70.0]  Not Applicable      Resolved Hospital Problems   No resolved problems to display.

## 2020-12-07 NOTE — TRANSFER OF CARE
Anesthesia Transfer of Care Note    Patient: Tee Nicole    Procedure(s) Performed: Procedure(s) (LRB):  Angiogram Extremity Unilateral (Bilateral)    Patient location: PACU    Anesthesia Type: general and MAC    Transport from OR: Transported from OR on room air with adequate spontaneous ventilation    Post pain: adequate analgesia    Post assessment: no apparent anesthetic complications and tolerated procedure well    Post vital signs: stable    Level of consciousness: awake, alert and oriented    Nausea/Vomiting: no nausea/vomiting    Complications: none    Transfer of care protocol was followed      Last vitals:   Visit Vitals  /70   Pulse 76   Temp 36.4 °C (97.5 °F) (Temporal)   Resp 16   Ht 6' (1.829 m)   Wt 90.7 kg (200 lb)   SpO2 (!) 94%   BMI 27.12 kg/m²

## 2020-12-07 NOTE — DISCHARGE INSTRUCTIONS
Peripheral Angiography  Peripheral angiography is an outpatient procedure that makes a map of the vessels (arteries) in your lower body, legs, and arms, using X-ray and dye.This map can show where blood flow may be blocked.  Talk with your healthcare provider about the risks and complications of angiography.   Before the procedure  Prepare for the peripheral angiography as follows:   · Tell your healthcare provider about all medicines you take and any allergies you may have.  · Follow any directions youre given for not eating or drinking before the procedure. If your provider says to take your normal medicines, swallow them with only small sips of water.  · Arrange for a family member or friend to drive you home.  During the procedure  Here is what to expect:  ·   You may get medicine through an IV (intravenous) line to relax you. Youre given an injection to numb the insertion site. Then, a tiny skin cut (incision) is made near an artery in your groin.  · Your provider inserts a thin tube (catheter) through the incision. He or she then threads the catheter into an artery while looking at a video monitor.  · Contrast dye is injected into the catheter to confirm position. You may feel warmth or pressure in your legs and back. You lie still as X-rays are taken. The catheter is then taken out.  After the procedure  Youll be taken to a recovery area. A healthcare provider will apply pressure to the site for about 10 minutes. Your healthcare provider will tell you how long to lie down and keep the insertion site still. Your healthcare provider will discuss the results with you soon after the procedure.  Back at home  On the day you get home, dont drive, dont exercise, avoid walking and taking stairs, and avoid bending and lifting. Your healthcare provider may give you other care instructions.  Call your healthcare provider  Call your healthcare provider right away if:  · You notice a lump or bleeding at the  insertion site  · You feel pain at the insertion site  · You become lightheaded or dizzy  · You have leg pain or numbness  · You do not urinate in 8 hours    Date Last Reviewed: 5/1/2016  © 6067-6734 [employer]. [address] [place] [address], [place] [address]. All rights reserved. This information is not intended as a substitute for professional medical care. Always follow your healthcare professional's instructions.

## 2020-12-07 NOTE — PLAN OF CARE
Transferred to [facility]. Alert and oriented. Tolerated p.o. fluids well. Denies pain. Vital signs stable. Bilateral   groin sites soft without swelling or bleeding noted. Prescriptions delivered to bedside ang taken by patient's son.

## 2020-12-07 NOTE — ANESTHESIA POSTPROCEDURE EVALUATION
Anesthesia Post Evaluation    Patient: Tee Nicole    Procedure(s) Performed: Procedure(s) (LRB):  Angiogram Extremity Unilateral (Bilateral)    Final Anesthesia Type: MAC    Patient location during evaluation: PACU  Patient participation: Yes- Able to Participate  Level of consciousness: awake and alert and oriented  Post-procedure vital signs: reviewed and stable  Pain management: adequate  Airway patency: patent    PONV status at discharge: No PONV  Anesthetic complications: no      Cardiovascular status: blood pressure returned to baseline  Respiratory status: unassisted, room air and spontaneous ventilation  Hydration status: euvolemic  Follow-up not needed.          Vitals Value Taken Time   /82 12/07/20 0931   Temp 36.4 °C (97.5 °F) 12/07/20 0913   Pulse 74 12/07/20 0935   Resp 14 12/07/20 0935   SpO2 97 % 12/07/20 0935   Vitals shown include unvalidated device data.      No case tracking events are documented in the log.      Pain/Karol Score: No data recorded

## 2020-12-09 ENCOUNTER — TELEPHONE (OUTPATIENT)
Dept: VASCULAR SURGERY | Facility: CLINIC | Age: 50
End: 2020-12-09

## 2020-12-09 NOTE — TELEPHONE ENCOUNTER
"Received call from patient stating that his calf is very painful and describes pain "as if I worked out really hard and have an injury." Denies signs and symptoms of infection or DVT. States "I've been taking the pain medication every four hours rather than every 6 hours." Discussed with Dr. Garcia who stated he would contact patient. Dr. Garcia notified nurse that patient states pain is now much better and he can walk as far as he wants and also states his arm pain which has lasted for a year or more is now better. Will contact patient to schedule FU.  "

## 2020-12-10 ENCOUNTER — HOSPITAL ENCOUNTER (OUTPATIENT)
Dept: VASCULAR SURGERY | Facility: CLINIC | Age: 50
Discharge: HOME | End: 2020-12-10
Attending: SURGERY
Payer: MEDICAID

## 2020-12-10 ENCOUNTER — TELEPHONE (OUTPATIENT)
Dept: VASCULAR SURGERY | Facility: CLINIC | Age: 50
End: 2020-12-10

## 2020-12-10 ENCOUNTER — OFFICE VISIT (OUTPATIENT)
Dept: VASCULAR SURGERY | Facility: CLINIC | Age: 50
End: 2020-12-10
Attending: SURGERY
Payer: MEDICAID

## 2020-12-10 VITALS
SYSTOLIC BLOOD PRESSURE: 123 MMHG | HEIGHT: 72 IN | HEART RATE: 106 BPM | TEMPERATURE: 99 F | BODY MASS INDEX: 26.57 KG/M2 | WEIGHT: 196.19 LBS | DIASTOLIC BLOOD PRESSURE: 76 MMHG

## 2020-12-10 DIAGNOSIS — I70.262 ATHEROSCLEROSIS OF NATIVE ARTERY OF LEFT LOWER EXTREMITY WITH GANGRENE: Primary | ICD-10-CM

## 2020-12-10 DIAGNOSIS — I77.1 ILIAC ARTERY STENOSIS, BILATERAL: ICD-10-CM

## 2020-12-10 DIAGNOSIS — I70.262 ATHEROSCLEROSIS OF NATIVE ARTERY OF LEFT LOWER EXTREMITY WITH GANGRENE: ICD-10-CM

## 2020-12-10 DIAGNOSIS — Z95.828 S/P INSERTION OF ILIAC ARTERY STENT: ICD-10-CM

## 2020-12-10 PROCEDURE — 99213 OFFICE O/P EST LOW 20 MIN: CPT | Mod: PBBFAC,25 | Performed by: SURGERY

## 2020-12-10 PROCEDURE — 93925 LOWER EXTREMITY STUDY: CPT | Mod: PBBFAC | Performed by: SURGERY

## 2020-12-10 PROCEDURE — 99999 PR PBB SHADOW E&M-EST. PATIENT-LVL III: CPT | Mod: PBBFAC,,, | Performed by: SURGERY

## 2020-12-10 PROCEDURE — 93923 UPR/LXTR ART STDY 3+ LVLS: CPT | Mod: PBBFAC | Performed by: SURGERY

## 2020-12-10 PROCEDURE — 99214 PR OFFICE/OUTPT VISIT, EST, LEVL IV, 30-39 MIN: ICD-10-PCS | Mod: S$PBB,,, | Performed by: SURGERY

## 2020-12-10 PROCEDURE — 93925 LOWER EXTREMITY STUDY: CPT | Mod: 26,S$PBB,, | Performed by: SURGERY

## 2020-12-10 PROCEDURE — 93923 PR NON-INVASIVE PHYSIOLOGIC STUDY EXTREMITY 3 LEVELS: ICD-10-PCS | Mod: 26,S$PBB,, | Performed by: SURGERY

## 2020-12-10 PROCEDURE — 93925 PR DUPLEX LO EXTREM ART BILAT: ICD-10-PCS | Mod: 26,S$PBB,, | Performed by: SURGERY

## 2020-12-10 PROCEDURE — 93923 UPR/LXTR ART STDY 3+ LVLS: CPT | Mod: 26,S$PBB,, | Performed by: SURGERY

## 2020-12-10 PROCEDURE — 99999 PR PBB SHADOW E&M-EST. PATIENT-LVL III: ICD-10-PCS | Mod: PBBFAC,,, | Performed by: SURGERY

## 2020-12-10 PROCEDURE — 99214 OFFICE O/P EST MOD 30 MIN: CPT | Mod: S$PBB,,, | Performed by: SURGERY

## 2020-12-10 RX ORDER — HYDROCODONE BITARTRATE AND ACETAMINOPHEN 5; 325 MG/1; MG/1
1 TABLET ORAL EVERY 6 HOURS PRN
Qty: 10 TABLET | Refills: 0 | Status: SHIPPED | OUTPATIENT
Start: 2020-12-10 | End: 2021-01-04

## 2020-12-10 NOTE — TELEPHONE ENCOUNTER
Contacted patient in response to message. Pt states he began to experience pain 8/10 throughout the night and that pain medication was not helping. States his pain improved yesterday but got worse throughout the night. States he was barely able to put weight on the foot. Denies s/s infection or DVT. After speaking with Dr. Garcia nurse contacted patient to schedule same day appt with Dr. Ragsdale with [facility] & harinder. CFAL. Appt scheduled, pt verified. ----- Message from Janett Iniguez sent at 12/10/2020  8:36 AM CST -----  Regarding: Pt  Reason: Pt calling to speak with Arielle kunz in pain. Please call       Communication: [phone]

## 2020-12-15 ENCOUNTER — TELEPHONE (OUTPATIENT)
Dept: VASCULAR SURGERY | Facility: CLINIC | Age: 50
End: 2020-12-15

## 2020-12-15 ENCOUNTER — HOSPITAL ENCOUNTER (EMERGENCY)
Facility: HOSPITAL | Age: 50
Discharge: HOME | End: 2020-12-16
Attending: EMERGENCY MEDICINE
Payer: MEDICAID

## 2020-12-15 VITALS
HEART RATE: 76 BPM | RESPIRATION RATE: 16 BRPM | WEIGHT: 196 LBS | DIASTOLIC BLOOD PRESSURE: 72 MMHG | BODY MASS INDEX: 26.55 KG/M2 | OXYGEN SATURATION: 98 % | SYSTOLIC BLOOD PRESSURE: 114 MMHG | HEIGHT: 72 IN | TEMPERATURE: 98 F

## 2020-12-15 DIAGNOSIS — M79.606 LEG PAIN: ICD-10-CM

## 2020-12-15 DIAGNOSIS — I73.9 VASCULAR CLAUDICATION: Primary | ICD-10-CM

## 2020-12-15 LAB
ALBUMIN SERPL BCP-MCNC: 3.4 G/DL (ref 3.5–5.2)
ALP SERPL-CCNC: 107 U/L (ref 55–135)
ALT SERPL W/O P-5'-P-CCNC: 22 U/L (ref 10–44)
ANION GAP SERPL CALC-SCNC: 9 MMOL/L (ref 8–16)
AST SERPL-CCNC: 18 U/L (ref 10–40)
BASOPHILS # BLD AUTO: 0.07 K/UL (ref 0–0.2)
BASOPHILS NFR BLD: 0.6 % (ref 0–1.9)
BILIRUB SERPL-MCNC: 0.3 MG/DL (ref 0.1–1)
BUN SERPL-MCNC: 14 MG/DL (ref 6–20)
CALCIUM SERPL-MCNC: 8.9 MG/DL (ref 8.7–10.5)
CHLORIDE SERPL-SCNC: 103 MMOL/L (ref 95–110)
CK SERPL-CCNC: 70 U/L (ref 20–200)
CO2 SERPL-SCNC: 27 MMOL/L (ref 23–29)
CREAT SERPL-MCNC: 1 MG/DL (ref 0.5–1.4)
DIFFERENTIAL METHOD: ABNORMAL
EOSINOPHIL # BLD AUTO: 0.3 K/UL (ref 0–0.5)
EOSINOPHIL NFR BLD: 2.9 % (ref 0–8)
ERYTHROCYTE [DISTWIDTH] IN BLOOD BY AUTOMATED COUNT: 13.2 % (ref 11.5–14.5)
EST. GFR  (AFRICAN AMERICAN): >60 ML/MIN/1.73 M^2
EST. GFR  (NON AFRICAN AMERICAN): >60 ML/MIN/1.73 M^2
GLUCOSE SERPL-MCNC: 105 MG/DL (ref 70–110)
HCT VFR BLD AUTO: 40.4 % (ref 40–54)
HGB BLD-MCNC: 13.6 G/DL (ref 14–18)
IMM GRANULOCYTES # BLD AUTO: 0.04 K/UL (ref 0–0.04)
IMM GRANULOCYTES NFR BLD AUTO: 0.3 % (ref 0–0.5)
LYMPHOCYTES # BLD AUTO: 3.4 K/UL (ref 1–4.8)
LYMPHOCYTES NFR BLD: 28.7 % (ref 18–48)
MCH RBC QN AUTO: 30.3 PG (ref 27–31)
MCHC RBC AUTO-ENTMCNC: 33.7 G/DL (ref 32–36)
MCV RBC AUTO: 90 FL (ref 82–98)
MONOCYTES # BLD AUTO: 1.1 K/UL (ref 0.3–1)
MONOCYTES NFR BLD: 9.6 % (ref 4–15)
NEUTROPHILS # BLD AUTO: 6.8 K/UL (ref 1.8–7.7)
NEUTROPHILS NFR BLD: 57.9 % (ref 38–73)
NRBC BLD-RTO: 0 /100 WBC
PLATELET # BLD AUTO: 317 K/UL (ref 150–350)
PMV BLD AUTO: 9.7 FL (ref 9.2–12.9)
POTASSIUM SERPL-SCNC: 4.4 MMOL/L (ref 3.5–5.1)
PROT SERPL-MCNC: 7 G/DL (ref 6–8.4)
RBC # BLD AUTO: 4.49 M/UL (ref 4.6–6.2)
SODIUM SERPL-SCNC: 139 MMOL/L (ref 136–145)
WBC # BLD AUTO: 11.79 K/UL (ref 3.9–12.7)

## 2020-12-15 PROCEDURE — 80053 COMPREHEN METABOLIC PANEL: CPT

## 2020-12-15 PROCEDURE — 99284 EMERGENCY DEPT VISIT MOD MDM: CPT | Mod: 25

## 2020-12-15 PROCEDURE — 85025 COMPLETE CBC W/AUTO DIFF WBC: CPT

## 2020-12-15 PROCEDURE — 82550 ASSAY OF CK (CPK): CPT

## 2020-12-15 PROCEDURE — 25000003 PHARM REV CODE 250: Performed by: EMERGENCY MEDICINE

## 2020-12-15 RX ORDER — HYDROCODONE BITARTRATE AND ACETAMINOPHEN 5; 325 MG/1; MG/1
1 TABLET ORAL EVERY 6 HOURS PRN
Qty: 5 TABLET | Refills: 0 | Status: SHIPPED | OUTPATIENT
Start: 2020-12-15 | End: 2020-12-18

## 2020-12-15 RX ORDER — HYDROCODONE BITARTRATE AND ACETAMINOPHEN 5; 325 MG/1; MG/1
1 TABLET ORAL
Status: COMPLETED | OUTPATIENT
Start: 2020-12-15 | End: 2020-12-15

## 2020-12-15 RX ORDER — MORPHINE SULFATE 4 MG/ML
4 INJECTION, SOLUTION INTRAMUSCULAR; INTRAVENOUS
Status: DISCONTINUED | OUTPATIENT
Start: 2020-12-15 | End: 2020-12-15

## 2020-12-15 RX ADMIN — HYDROCODONE BITARTRATE AND ACETAMINOPHEN 1 TABLET: 5; 325 TABLET ORAL at 10:12

## 2020-12-15 NOTE — TELEPHONE ENCOUNTER
"Contacted patient in resposne to message. Pt states he needs a refill on Plavix. Notified pt that he has refills available to him at preferred pharmacy in [place]. Pt verbalized understanding. Pt also states he is also experiencing right calf pain 6/10 and is only able to walk around one block. Denies signs or symptoms of infection or DVT. States he was prescribed 10 Norco by Dr. Ragsdale last week but was out of medication by Sunday. States he has been "suffering" since then and has not taken any Tylenol or Motrin for pain. Notified patient that nurse will send message to Dr. Garcia and will contact patient with response. Pt verbalized understanding.----- Message from Kym Keenan sent at 12/15/2020  2:24 PM CST -----  Regarding: Pain  Contact: Patient Called [phone]  Patient had Procedure on 12/7/20.  States that he is in pain, and is out of pain medication.  Wanting to know if he can get a refill.  Also wanting to know if doctor can fill his Plavix and Eliquis.        HYDROcodone-acetaminophen (NORCO) 5-325 mg per tablet 10 tablet   clopidogreL (PLAVIX) 75 mg tablet 30 tablet   apixaban (ELIQUIS) 5 mg Tab 60 tablet         [facility] [address] [place] [facility] [address]  [place] [address]  Phone: [phone] Fax: [phone]        "

## 2020-12-16 ENCOUNTER — TELEPHONE (OUTPATIENT)
Dept: VASCULAR SURGERY | Facility: CLINIC | Age: 50
End: 2020-12-16

## 2020-12-16 NOTE — ED PROVIDER NOTES
Encounter Date: 12/15/2020    SCRIBE #1 NOTE: I, Cristal Thomaseria, am scribing for, and in the presence of,  Long Robins MD. I have scribed the entire note.       History     Chief Complaint   Patient presents with    Leg Pain     pt c/o right calf pain since last Tuesday, reports pain with ambulation and palpation; had US on Thursday to r/o DVT; recent angiogram reported       Time seen by provider: 9:09 PM on 12/15/2020    The patient is a 50 y.o. male who presents to the ED with complaint of leg pain which onset 1 week. Patient reports his pain is located to his right calf, and notes he has tried to walk it off, however it has caused him difficulty and pain to stand. He describes the pain as if someone hit his leg with a baseball bat. He denies any injuries. Symptoms are worsening in severity. Patient denies any fever, chills, chest pain, shortness of breath, leg swelling, and all other sxs at this time. He reports on Thursday he had an ultrasound done and was told he did not have a blood clot, and was given medication. Initial /78     has a past medical history of Kidney stone.  has a past surgical history that includes Aortography with serialography (N/A, 11/4/2020) and Angiography of lower extremity (Bilateral, 12/7/2020).    The history is provided by the patient.     Review of patient's allergies indicates:   Allergen Reactions    Pcn [penicillins] Anaphylaxis     Past Medical History:   Diagnosis Date    Kidney stone      Past Surgical History:   Procedure Laterality Date    ANGIOGRAPHY OF LOWER EXTREMITY Bilateral 12/7/2020    Procedure: Angiogram Extremity Unilateral;  Surgeon: Terrance Garcia MD;  Location: [facility] OR [address] [place] [address];  Service: Vascular;  Laterality: Bilateral;  Iliac, PTA, and stenting.  7.4 min   1675.32 mGy  274.28 Gycm2    AORTOGRAPHY WITH SERIALOGRAPHY N/A 11/4/2020    Procedure: AORTOGRAM, WITH SERIALOGRAPHY;  Surgeon: Luis Rebollar III, MD;  Location: [facility] CATH LAB;   Service: Cardiology;  Laterality: N/A;     Family History   Problem Relation Age of Onset    Stroke Mother     Stroke Father     No Known Problems Sister      Social History     Tobacco Use    Smoking status: Former Smoker     Packs/day: 1.00     Types: Cigarettes    Smokeless tobacco: Never Used   Substance Use Topics    Alcohol use: Yes     Comment: occasional    Drug use: No     Review of Systems   Constitutional: Negative for chills and fever.   HENT: Negative for sore throat.    Eyes: Negative for redness.   Respiratory: Negative for shortness of breath.    Cardiovascular: Negative for chest pain.   Gastrointestinal: Negative for abdominal pain, diarrhea, nausea and vomiting.   Genitourinary: Negative for dysuria and hematuria.   Musculoskeletal: Negative for back pain.        + right calf pain   Skin: Negative for rash.   Neurological: Negative for headaches.       Physical Exam     Initial Vitals [12/15/20 1819]   BP Pulse Resp Temp SpO2   (!) 144/78 (!) 113 20 98.4 °F (36.9 °C) 98 %      MAP       --         Physical Exam    Nursing note and vitals reviewed.  Constitutional: He appears well-developed and well-nourished. He is not diaphoretic. No distress.   HENT:   Head: Normocephalic and atraumatic.   Mouth/Throat: Oropharynx is clear and moist.   Eyes: Conjunctivae and EOM are normal.   Neck: Normal range of motion. Neck supple.   Cardiovascular: Normal rate, regular rhythm and normal heart sounds.   Pulmonary/Chest: Breath sounds normal. No respiratory distress.   Abdominal: Soft. There is no abdominal tenderness.   Genitourinary:    Genitourinary Comments: Well healing post surgical incisions in the groin with bruising but no sign of hematoma     Musculoskeletal: Normal range of motion. No tenderness or edema.      Comments: Legs   No cellulitic changes, diminished but palpable pulses, no significant swelling, no erythema,   Neurological: He is alert and oriented to person, place, and time. He has  normal strength.   Skin: Skin is warm and dry.   Psychiatric: He has a normal mood and affect. His behavior is normal. Thought content normal.         ED Course   Procedures  Labs Reviewed   CBC W/ AUTO DIFFERENTIAL - Abnormal; Notable for the following components:       Result Value    RBC 4.49 (*)     Hemoglobin 13.6 (*)     Mono # 1.1 (*)     All other components within normal limits   COMPREHENSIVE METABOLIC PANEL - Abnormal; Notable for the following components:    Albumin 3.4 (*)     All other components within normal limits   CK          Imaging Results          US Lower Extremity Veins Bilateral (Final result)  Result time 12/15/20 22:50:22    Final result by Roger Fregoso MD (12/15/20 22:50:22)                 Impression:      No evidence of deep venous thrombosis in either lower extremity.      Electronically signed by: Roger Fregoso  Date:    12/15/2020  Time:    22:50             Narrative:    EXAMINATION:  US LOWER EXTREMITY VEINS BILATERAL    CLINICAL HISTORY:  Pain in leg, unspecified    TECHNIQUE:  Duplex and color flow Doppler and dynamic compression was performed of the bilateral lower extremity veins was performed.    COMPARISON:  None    FINDINGS:  Right thigh veins: The common femoral, femoral, popliteal, upper greater saphenous, and deep femoral veins are patent and free of thrombus. The veins are normally compressible and have normal phasic flow and augmentation response.    Right calf veins: The visualized calf veins are patent.    Left thigh veins: The common femoral, femoral, popliteal, upper greater saphenous, and deep femoral veins are patent and free of thrombus. The veins are normally compressible and have normal phasic flow and augmentation response.    Left calf veins: The visualized calf veins are patent.    Miscellaneous: Postsurgical changes are noted in the right groin.                                 Medical Decision Making:   History:   Old Medical Records: I decided to  obtain old medical records.  Initial Assessment:   50-year-old Male, multiple medical problems, presents the ER for evaluation right calf pain.  Does have a history of atherosclerotic disease, iliac occlusive disease, has stents, compliant with Eliquis and Plavix, presents the ER for evaluation worsening right calf pain.  Worse with ambulation, reports cannot walk a block without worsening pain.  No fever, chills, chest pain shortness of breath.  Has seen and evaluated by Dr. reeves, vascular surgery, had repeat ultrasounds which were negative.  Was given pain medication which improved his pain but he has ran out.  Came to the ER for further evaluation.  Patient likely has peripheral artery disease causing vascular claudication, other differential includes DVT, rhabdo, versus other cause.  Will attempt pain control blood work ultrasound will reassess.  Clinical Tests:   Lab Tests: Ordered and Reviewed  Radiological Study: Ordered and Reviewed                   ED Course as of Dec 16 0343   Tue Dec 15, 2020   2339 Resting in bed, no acute distress.  No acute process identified.  Discussed the patient diagnosis of vascular cauterization, needs follow-up with his vascular surgeon.  Will give short course of pain meds.  Advised patient to stop smoking, patient will be discharged.    [SE]      ED Course User Index  [SE] Long Robins MD            Clinical Impression:     ICD-10-CM ICD-9-CM   1. Vascular claudication  I73.9 443.9   2. Leg pain  M79.606 729.5                      Disposition:   Disposition: Discharged  Condition: Stable     ED Disposition Condition    Discharge Stable        ED Prescriptions     Medication Sig Dispense Start Date End Date Auth. Provider    HYDROcodone-acetaminophen (NORCO) 5-325 mg per tablet Take 1 tablet by mouth every 6 (six) hours as needed. 5 tablet 12/15/2020 12/18/2020 Long Robins MD        Follow-up Information     Follow up With Specialties Details Why Contact Info     Krunal Feliz MD Family Medicine Schedule an appointment as soon as possible for a visit   [address]  [facility] [address]  [phone]      Terrance Garcia MD Vascular Surgery Schedule an appointment as soon as possible for a visit in 1 day As needed [address]  [facility] [address]  [phone]                        My Scribe Attestation: I acknowledge that the documentation on this chart was provided by described on the date of service noted above and that the documentation in the chart accurately reflects work and decisions made by me alone.                   Long Robins MD  12/16/20 0344

## 2020-12-16 NOTE — FIRST PROVIDER EVALUATION
Emergency Department TeleTriage Encounter Note      CHIEF COMPLAINT    Chief Complaint   Patient presents with    Leg Pain     pt c/o right calf pain since last Tuesday, reports pain with ambulation and palpation; had US on Thursday to r/o DVT; recent angiogram reported       VITAL SIGNS   Initial Vitals [12/15/20 1819]   BP Pulse Resp Temp SpO2   (!) 144/78 (!) 113 20 98.4 °F (36.9 °C) 98 %      MAP       --            ALLERGIES    Review of patient's allergies indicates:   Allergen Reactions    Pcn [penicillins] Anaphylaxis       PROVIDER TRIAGE NOTE  Patient is a 50-year-old male who presents to the ED for evaluation of leg pain.  Patient reports right calf pain for 1 week.  He reports increased pain with ambulation and with palpation of the calf.  Patient states last week he had an ultrasound done showed no stenosis.  Angiogram done on 12/07/2020 with stents.  Patient denies recent trauma or injury.  He has not taken Tylenol or ibuprofen for his pain.  Patient states he was taking Norco last week with relief but ran out of pills on Saturday.      ORDERS  Labs Reviewed - No data to display    ED Orders (720h ago, onward)    None            Virtual Visit Note: The provider triage portion of this emergency department evaluation and documentation was performed via [employer], a HIPAA-compliant telemedicine application, in concert with a tele-presenter in the room. A face to face patient evaluation with one of my colleagues will occur once the patient is placed in an emergency department room.      DISCLAIMER: This note was prepared with [employer] voice recognition transcription software. Garbled syntax, mangled pronouns, and other bizarre constructions may be attributed to that software system.

## 2020-12-16 NOTE — TELEPHONE ENCOUNTER
Attempted to contact patient to offer appt with Dr. Garcia at 1430 tomorrow. Voice message left for patient requesting return call.

## 2020-12-17 ENCOUNTER — TELEPHONE (OUTPATIENT)
Dept: VASCULAR SURGERY | Facility: CLINIC | Age: 50
End: 2020-12-17

## 2020-12-17 NOTE — TELEPHONE ENCOUNTER
Attempted to contact patient to determine whether or not he would be coming to appt today with Dr. Garcia at 1430. Voice message left for patient requesting return call.

## 2020-12-17 NOTE — TELEPHONE ENCOUNTER
Attempted to contact patient to offer appt with Dr. Garcia today at 1430. Voice message left for patient requesting return call. Will reattempt.

## 2020-12-21 ENCOUNTER — TELEPHONE (OUTPATIENT)
Dept: VASCULAR SURGERY | Facility: CLINIC | Age: 50
End: 2020-12-21

## 2020-12-21 NOTE — PROGRESS NOTES
"Tee DOMINGUEZ Mellott  12/10/2020    HPI:  Patient is s/p the following 12/07 with Dr. Garcia:    Bilateral iliac kissing stents - bilateral 6 mm x 59 mm Hampton VBX, then bilateral 8 mm x 60 mm Bard LifeStents placed more proximally.       He returns complaining of severe bilateral R>L hip to foot pain that prevents him from sleeping at times.  He is pain free at the moment.     Previously:    Patient is a 50 y.o. male with a h/o kidney stones who is here today for a follow up evaluation of a nonhealing ulcer. He began having claudication symptoms from his buttocks to his thighs while walking in January 2020 that was sudden in onset. He attributed his symptoms to "being in bad shape" and tried to go on walks to improve his symptoms, but it didn't help. He is unable to walk for less than half a block before the pain begins. In August, he stubbed his toe twice and his toe wasn't able to support any weight anymore. He then developed pain while supine for an hour. He went to the ED and was admitted and was told that he had a clot in his distal aorta. He has a likely embolic ulcer in his left fifth toe. He is not complaining of impotence. He was seen by Dr. Rebollar who performed an angiogram revealing large clot in his aorta. He was start on aspirin and plavix, which he feels has improved his symptoms and he is able to now sleep throughout the night. He was referred here by Dr. Rebollar. On his last visit he was started on Eliquis and scheduled for a CTA and Angiogram of his abdomen/pelvis, and lower extremities. He returns today to review his imaging studies and discuss management options.      Denies MI/stroke  Tobacco use: 1 ppd for 30 years. Stopped in September 2020    Past Medical History:   Diagnosis Date    Kidney stone      Past Surgical History:   Procedure Laterality Date    ANGIOGRAPHY OF LOWER EXTREMITY Bilateral 12/7/2020    Procedure: Angiogram Extremity Unilateral;  Surgeon: Terrance Garcia MD;  Location: " [facility] OR 2ND FLR;  Service: Vascular;  Laterality: Bilateral;  Iliac, PTA, and stenting.  7.4 min   1675.32 mGy  274.28 Gycm2    AORTOGRAPHY WITH SERIALOGRAPHY N/A 11/4/2020    Procedure: AORTOGRAM, WITH SERIALOGRAPHY;  Surgeon: Luis Rebollar III, MD;  Location: [facility] CATH LAB;  Service: Cardiology;  Laterality: N/A;     Family History   Problem Relation Age of Onset    Stroke Mother     Stroke Father     No Known Problems Sister      Social History     Socioeconomic History    Marital status: Single     Spouse name: Not on file    Number of children: Not on file    Years of education: Not on file    Highest education level: Not on file   Occupational History    Not on file   Social Needs    Financial resource strain: Not on file    Food insecurity     Worry: Not on file     Inability: Not on file    Transportation needs     Medical: Not on file     Non-medical: Not on file   Tobacco Use    Smoking status: Former Smoker     Packs/day: 1.00     Types: Cigarettes    Smokeless tobacco: Never Used   Substance and Sexual Activity    Alcohol use: Yes     Comment: occasional    Drug use: No    Sexual activity: Not Currently   Lifestyle    Physical activity     Days per week: Not on file     Minutes per session: Not on file    Stress: Not on file   Relationships    Social connections     Talks on phone: Not on file     Gets together: Not on file     Attends Alevism service: Not on file     Active member of club or organization: Not on file     Attends meetings of clubs or organizations: Not on file     Relationship status: Not on file   Other Topics Concern    Not on file   Social History Narrative    Not on file       Current Outpatient Medications:     apixaban (ELIQUIS) 5 mg Tab, Take 1 tablet (5 mg total) by mouth 2 (two) times daily.May restart Eliquis in the AM 12/8/2020, Disp: 60 tablet, Rfl: 2    aspirin 81 MG Chew, Take 1 tablet (81 mg total) by mouth once daily., Disp: 30 tablet, Rfl:  11    atorvastatin (LIPITOR) 80 MG tablet, Take 1 tablet (80 mg total) by mouth every evening., Disp: 90 tablet, Rfl: 3    clopidogreL (PLAVIX) 75 mg tablet, Take 1 tablet (75 mg total) by mouth once daily., Disp: 30 tablet, Rfl: 11    HYDROcodone-acetaminophen (NORCO) 5-325 mg per tablet, Take 1 tablet by mouth every 6 (six) hours as needed., Disp: 10 tablet, Rfl: 0    REVIEW OF SYSTEMS:  General: negative; ENT: negative; Allergy and Immunology: negative; Hematological and Lymphatic: Negative; Endocrine: negative; Respiratory: no cough, shortness of breath, or wheezing; Cardiovascular: no chest pain or dyspnea on exertion; Gastrointestinal: no abdominal pain/back, change in bowel habits, or bloody stools; Genito-Urinary: no dysuria, trouble voiding, or hematuria; Musculoskeletal: negative  Neurological: no TIA or stroke symptoms; Psychiatric: no nervousness, anxiety or depression.    PHYSICAL EXAM:   Right Arm BP - Sittin/76 (12/10/20 1435)  Left Arm BP - Sittin/81 (12/10/20 1435)  Pulse: 106  Temp: 98.8 °F (37.1 °C)      General appearance:  Alert, well-appearing, and in no distress.  Oriented to person, place, and time   Neurological: Normal speech, no focal findings noted; CN II - XII grossly intact           Musculoskeletal: Digits/nail without cyanosis/clubbing.  Normal muscle strength/tone.                 Neck: Supple, no significant adenopathy; thyroid is not enlarged                Chest:  Clear to auscultation, no wheezes, rales or rhonchi, symmetric air entry      No use of accessory muscles             Cardiac: Normal rate and regular rhythm, S1 and S2 normal; PMI non-displaced          Abdomen: Soft, nontender, nondistended, no masses or organomegaly      No rebound tenderness noted; bowel sounds normal      Pulsatile aortic mass is not palpable.      No groin adenopathy      Extremities:   2+ femoral pulses bilaterally, 1+ pedal pulses bilaterally       Ulcer on the plantar aspect of  the fifth toe has nearly healed    LAB RESULTS:  Lab Results   Component Value Date    K 4.4 12/15/2020    K 4.0 10/30/2020    K 4.5 10/04/2020    CREATININE 1.0 12/15/2020    CREATININE 0.97 10/30/2020    CREATININE 1.0 10/04/2020     Lab Results   Component Value Date    WBC 11.79 12/15/2020    WBC 10.51 10/30/2020    WBC 10.88 10/04/2020    HCT 40.4 12/15/2020    HCT 48.1 10/30/2020    HCT 46.3 10/04/2020     12/15/2020     10/30/2020     10/04/2020     Lab Results   Component Value Date    HGBA1C 5.6 10/04/2020     IMAGIN/10    Evaluation of inflow of peripheral vascular disease     Aorta   =====     Aorta distal PS    221 cm/s     Iliac Arteries   ==========     Rt prox JADIEL PSV    198 cm/s   Rt mid JADIEL  cm/s   Rt distal JADIEL PSV  158 cm/s   Rt prox EIA PSV    142 cm/s   Lt prox JADIEL PSV    185 cm/s   Lt mid JADIEL  cm/s   Lt distal JADIEL PSV  156 cm/s   Lt prox EIA PSV    113 cm/s     Impression   =========     Duplex ultrasound of the distal aorta with kissing stents reveals no evidence of a hemodynamically significant stenosis, however,   there were accelerated velocities noted throughout the stent. Technically difficult study due to heavy acoustic shadowing and body   habitus. Patient did not fast prior to study. Stents could not be visualized in its entirety.     Peripheral Vascular Disease     Pressure Lower   ============     Rt brachial A syst BP  114 mmHg   Rt PTA BP  104 mmHg   Rt dors pedis A  mmHg   Rt FIGUEROA post tibial (rt post tib A BP / max brach A BP) 0.91   Rt FIGUEROA (rt dors ped A BP / max brach A BP) 0.97   Rt ankle BP / max brach A BP   0.97   Lt brachial A syst BP  108 mmHg   Lt PTA BP  106 mmHg   Lt dors pedis A  mmHg   Lt FIGUEROA (lt post tibial A BP / max brach A BP)  0.93   Lt FIGUEROA dors ped (lt dors ped A BP / max brach A BP)    0.97   Lt ankle BP / max brach A BP   0.97     Impression   =========     Right Leg: Segmental pressures and PVR waveforms  suggest minimal peripheral arterial occlusive disease.   Left Leg: Segmental pressures and PVR waveforms suggest minimal peripheral arterial occlusive disease.    Aorta   =====     Aorta distal PS    221 cm/s     Iliac Arteries   ==========     Rt prox JADIEL PSV    198 cm/s   Rt mid JADIEL  cm/s   Rt distal JADIEL PSV  158 cm/s   Rt prox EIA PSV    142 cm/s   Lt prox JADIEL PSV    185 cm/s   Lt mid JADIEL  cm/s   Lt distal JADIEL PSV  156 cm/s   Lt prox EIA PSV    113 cm/s     Impression   =========     Duplex ultrasound of the distal aorta with kissing stents reveals no evidence of a hemodynamically significant stenosis, however,   there were accelerated velocities noted throughout the stent. Technically difficult study due to heavy acoustic shadowing and body   habitus. Patient did not fast prior to study. Stents could not be visualized in its entirety.    IMP/PLAN:  50 y.o. 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  He complains of intense pain but is pain free in the office with normal FIGUEROA and duplex.  Access sites appear normal.     - RTC with Dr. Garcia in one month for wound check   - Continue Plavix and Eliquis 5 bid

## 2020-12-21 NOTE — TELEPHONE ENCOUNTER
Contacted patient in response to message. Pt states he has scheduled himself for an appt with Dr. Garcia on 01/11/21 and would like to know if he should be seen sooner than this. States his pain is much improved. Notified patient that Dr. Ragsdale's notes states that patient should return in 1 month for visit with Dr. Garcia and that if patient is comfortable with his current appt it can remain as is. Pt states he is happy to keep date. Instructed patient to call if symptoms worsen or if he becomes concerned. Pt verbalized understanding.   Message from Sandoval Quinonez sent at 12/21/2020 10:24 AM CST -----  Patient called requesting to speak w/ Arielle regarding scheduling his post-op f/u w/  2 weeks following surgery on 12/07, callback [phone]

## 2020-12-29 ENCOUNTER — TELEPHONE (OUTPATIENT)
Dept: VASCULAR SURGERY | Facility: CLINIC | Age: 50
End: 2020-12-29

## 2020-12-29 NOTE — TELEPHONE ENCOUNTER
Contacted patient in response to message. States he would like to know if he can run and jump or if this will cause problems with intervention. Message sent to Dr. Garcia and will contact patient with response.----- Message from Kym Keenan sent at 12/29/2020  1:52 PM CST -----  Regarding: Call Back Request  Contact: Patient Called [phone]  Requesting a call back from Nurse.  States that he was informed to call her if he had any questions.  Contact patient at [phone]

## 2021-01-04 ENCOUNTER — TELEPHONE (OUTPATIENT)
Dept: VASCULAR SURGERY | Facility: CLINIC | Age: 51
End: 2021-01-04

## 2021-01-04 ENCOUNTER — OFFICE VISIT (OUTPATIENT)
Dept: CARDIOLOGY | Facility: CLINIC | Age: 51
End: 2021-01-04
Payer: MEDICAID

## 2021-01-04 VITALS
SYSTOLIC BLOOD PRESSURE: 112 MMHG | BODY MASS INDEX: 27.63 KG/M2 | HEART RATE: 100 BPM | HEIGHT: 72 IN | OXYGEN SATURATION: 98 % | DIASTOLIC BLOOD PRESSURE: 78 MMHG | WEIGHT: 204 LBS

## 2021-01-04 DIAGNOSIS — I70.211 ATHEROSCLEROSIS OF NATIVE ARTERY OF RIGHT LOWER EXTREMITY WITH INTERMITTENT CLAUDICATION: ICD-10-CM

## 2021-01-04 DIAGNOSIS — Z72.0 TOBACCO USE: ICD-10-CM

## 2021-01-04 DIAGNOSIS — I96 GANGRENOUS TOE: ICD-10-CM

## 2021-01-04 DIAGNOSIS — I70.0 AORTIC OCCLUSION: ICD-10-CM

## 2021-01-04 PROCEDURE — 99214 PR OFFICE/OUTPT VISIT, EST, LEVL IV, 30-39 MIN: ICD-10-PCS | Mod: S$PBB,,, | Performed by: INTERNAL MEDICINE

## 2021-01-04 PROCEDURE — 99999 PR PBB SHADOW E&M-EST. PATIENT-LVL III: ICD-10-PCS | Mod: PBBFAC,,, | Performed by: INTERNAL MEDICINE

## 2021-01-04 PROCEDURE — 99214 OFFICE O/P EST MOD 30 MIN: CPT | Mod: S$PBB,,, | Performed by: INTERNAL MEDICINE

## 2021-01-04 PROCEDURE — 99213 OFFICE O/P EST LOW 20 MIN: CPT | Mod: PBBFAC,PN | Performed by: INTERNAL MEDICINE

## 2021-01-04 PROCEDURE — 99999 PR PBB SHADOW E&M-EST. PATIENT-LVL III: CPT | Mod: PBBFAC,,, | Performed by: INTERNAL MEDICINE

## 2021-01-04 RX ORDER — ATORVASTATIN CALCIUM 80 MG/1
80 TABLET, FILM COATED ORAL NIGHTLY
Qty: 90 TABLET | Refills: 3 | Status: SHIPPED | OUTPATIENT
Start: 2021-01-04 | End: 2021-01-04 | Stop reason: SDUPTHER

## 2021-01-04 RX ORDER — ATORVASTATIN CALCIUM 80 MG/1
80 TABLET, FILM COATED ORAL NIGHTLY
Qty: 90 TABLET | Refills: 3 | Status: SHIPPED | OUTPATIENT
Start: 2021-01-04 | End: 2021-11-02 | Stop reason: SDUPTHER

## 2021-01-08 ENCOUNTER — OFFICE VISIT (OUTPATIENT)
Dept: VASCULAR SURGERY | Facility: CLINIC | Age: 51
End: 2021-01-08
Payer: MEDICAID

## 2021-01-08 VITALS
BODY MASS INDEX: 27.5 KG/M2 | TEMPERATURE: 98 F | HEART RATE: 98 BPM | SYSTOLIC BLOOD PRESSURE: 128 MMHG | DIASTOLIC BLOOD PRESSURE: 83 MMHG | WEIGHT: 203.06 LBS | HEIGHT: 72 IN

## 2021-01-08 DIAGNOSIS — I70.262 ATHEROSCLEROSIS OF NATIVE ARTERY OF LEFT LOWER EXTREMITY WITH GANGRENE: Primary | ICD-10-CM

## 2021-01-08 PROCEDURE — 99214 OFFICE O/P EST MOD 30 MIN: CPT | Mod: S$PBB,,, | Performed by: SURGERY

## 2021-01-08 PROCEDURE — 99214 PR OFFICE/OUTPT VISIT, EST, LEVL IV, 30-39 MIN: ICD-10-PCS | Mod: S$PBB,,, | Performed by: SURGERY

## 2021-01-08 PROCEDURE — 99213 OFFICE O/P EST LOW 20 MIN: CPT | Mod: PBBFAC | Performed by: SURGERY

## 2021-01-08 PROCEDURE — 99999 PR PBB SHADOW E&M-EST. PATIENT-LVL III: ICD-10-PCS | Mod: PBBFAC,,, | Performed by: SURGERY

## 2021-01-08 PROCEDURE — 99999 PR PBB SHADOW E&M-EST. PATIENT-LVL III: CPT | Mod: PBBFAC,,, | Performed by: SURGERY

## 2021-01-08 RX ORDER — MELOXICAM 7.5 MG/1
7.5 TABLET ORAL DAILY
Qty: 15 TABLET | Refills: 0 | Status: SHIPPED | OUTPATIENT
Start: 2021-01-08 | End: 2021-07-25 | Stop reason: CLARIF

## 2021-04-01 ENCOUNTER — PATIENT MESSAGE (OUTPATIENT)
Dept: CARDIOLOGY | Facility: CLINIC | Age: 51
End: 2021-04-01

## 2021-04-16 ENCOUNTER — PATIENT MESSAGE (OUTPATIENT)
Dept: RESEARCH | Facility: HOSPITAL | Age: 51
End: 2021-04-16

## 2021-04-19 ENCOUNTER — TELEPHONE (OUTPATIENT)
Dept: VASCULAR SURGERY | Facility: CLINIC | Age: 51
End: 2021-04-19

## 2021-04-19 DIAGNOSIS — I70.211 ATHEROSCLEROSIS OF NATIVE ARTERY OF RIGHT LOWER EXTREMITY WITH INTERMITTENT CLAUDICATION: Primary | ICD-10-CM

## 2021-04-20 ENCOUNTER — TELEPHONE (OUTPATIENT)
Dept: VASCULAR SURGERY | Facility: CLINIC | Age: 51
End: 2021-04-20

## 2021-04-26 ENCOUNTER — OFFICE VISIT (OUTPATIENT)
Dept: VASCULAR SURGERY | Facility: CLINIC | Age: 51
End: 2021-04-26
Payer: MEDICAID

## 2021-04-26 ENCOUNTER — TELEPHONE (OUTPATIENT)
Dept: VASCULAR SURGERY | Facility: CLINIC | Age: 51
End: 2021-04-26

## 2021-04-26 ENCOUNTER — HOSPITAL ENCOUNTER (OUTPATIENT)
Dept: VASCULAR SURGERY | Facility: CLINIC | Age: 51
Discharge: HOME | End: 2021-04-26
Attending: SURGERY
Payer: MEDICAID

## 2021-04-26 VITALS
TEMPERATURE: 99 F | HEART RATE: 88 BPM | BODY MASS INDEX: 28.99 KG/M2 | WEIGHT: 214.06 LBS | HEIGHT: 72 IN | DIASTOLIC BLOOD PRESSURE: 86 MMHG | SYSTOLIC BLOOD PRESSURE: 131 MMHG

## 2021-04-26 DIAGNOSIS — I70.211 ATHEROSCLEROSIS OF NATIVE ARTERY OF RIGHT LOWER EXTREMITY WITH INTERMITTENT CLAUDICATION: ICD-10-CM

## 2021-04-26 DIAGNOSIS — I73.9 PERIPHERAL VASCULAR DISEASE, UNSPECIFIED: Primary | ICD-10-CM

## 2021-04-26 PROCEDURE — 99999 PR PBB SHADOW E&M-EST. PATIENT-LVL III: ICD-10-PCS | Mod: PBBFAC,,, | Performed by: SURGERY

## 2021-04-26 PROCEDURE — 93923 UPR/LXTR ART STDY 3+ LVLS: CPT | Mod: 26,S$PBB,, | Performed by: SURGERY

## 2021-04-26 PROCEDURE — 99999 PR PBB SHADOW E&M-EST. PATIENT-LVL III: CPT | Mod: PBBFAC,,, | Performed by: SURGERY

## 2021-04-26 PROCEDURE — 99213 OFFICE O/P EST LOW 20 MIN: CPT | Mod: PBBFAC,25 | Performed by: SURGERY

## 2021-04-26 PROCEDURE — 93923 UPR/LXTR ART STDY 3+ LVLS: CPT | Mod: PBBFAC | Performed by: SURGERY

## 2021-04-26 PROCEDURE — 99213 PR OFFICE/OUTPT VISIT, EST, LEVL III, 20-29 MIN: ICD-10-PCS | Mod: S$PBB,,, | Performed by: SURGERY

## 2021-04-26 PROCEDURE — 93923 PR NON-INVASIVE PHYSIOLOGIC STUDY EXTREMITY 3 LEVELS: ICD-10-PCS | Mod: 26,S$PBB,, | Performed by: SURGERY

## 2021-04-26 PROCEDURE — 99213 OFFICE O/P EST LOW 20 MIN: CPT | Mod: S$PBB,,, | Performed by: SURGERY

## 2021-04-26 RX ORDER — MELOXICAM 15 MG/1
15 TABLET ORAL DAILY
Qty: 15 TABLET | Refills: 0 | Status: SHIPPED | OUTPATIENT
Start: 2021-04-26 | End: 2021-05-10

## 2021-04-26 RX ORDER — OMEPRAZOLE 20 MG/1
20 CAPSULE, DELAYED RELEASE ORAL 2 TIMES DAILY
Status: ACTIVE | OUTPATIENT
Start: 2021-04-26 | End: 2021-05-10

## 2021-07-25 ENCOUNTER — HOSPITAL ENCOUNTER (INPATIENT)
Facility: HOSPITAL | Age: 51
LOS: 2 days | Discharge: HOME | DRG: 272 | End: 2021-07-27
Attending: EMERGENCY MEDICINE | Admitting: HOSPITALIST
Payer: MEDICAID

## 2021-07-25 DIAGNOSIS — T82.868A: ICD-10-CM

## 2021-07-25 DIAGNOSIS — R07.9 CHEST PAIN: ICD-10-CM

## 2021-07-25 DIAGNOSIS — I70.229 CRITICAL LOWER LIMB ISCHEMIA: Primary | ICD-10-CM

## 2021-07-25 PROBLEM — I70.203 ATHEROSCLEROSIS OF ARTERY OF BOTH LOWER EXTREMITIES: Status: ACTIVE | Noted: 2021-07-25

## 2021-07-25 LAB
ALBUMIN SERPL BCP-MCNC: 3.3 G/DL (ref 3.5–5.2)
ALP SERPL-CCNC: 107 U/L (ref 55–135)
ALT SERPL W/O P-5'-P-CCNC: 22 U/L (ref 10–44)
ANION GAP SERPL CALC-SCNC: 11 MMOL/L (ref 8–16)
APTT BLDCRRT: 26.6 SEC (ref 21–32)
APTT BLDCRRT: 31.5 SEC (ref 21–32)
AST SERPL-CCNC: 16 U/L (ref 10–40)
BASOPHILS # BLD AUTO: 0.08 K/UL (ref 0–0.2)
BASOPHILS NFR BLD: 0.8 % (ref 0–1.9)
BILIRUB SERPL-MCNC: 0.4 MG/DL (ref 0.1–1)
BUN SERPL-MCNC: 13 MG/DL (ref 6–20)
CALCIUM SERPL-MCNC: 9.2 MG/DL (ref 8.7–10.5)
CHLORIDE SERPL-SCNC: 107 MMOL/L (ref 95–110)
CHOLEST SERPL-MCNC: 204 MG/DL (ref 120–199)
CHOLEST/HDLC SERPL: 7.8 {RATIO} (ref 2–5)
CO2 SERPL-SCNC: 20 MMOL/L (ref 23–29)
CREAT SERPL-MCNC: 1.1 MG/DL (ref 0.5–1.4)
CTP QC/QA: YES
DIFFERENTIAL METHOD: ABNORMAL
EOSINOPHIL # BLD AUTO: 0.3 K/UL (ref 0–0.5)
EOSINOPHIL NFR BLD: 3.2 % (ref 0–8)
ERYTHROCYTE [DISTWIDTH] IN BLOOD BY AUTOMATED COUNT: 13.6 % (ref 11.5–14.5)
EST. GFR  (AFRICAN AMERICAN): >60 ML/MIN/1.73 M^2
EST. GFR  (NON AFRICAN AMERICAN): >60 ML/MIN/1.73 M^2
ESTIMATED AVG GLUCOSE: 126 MG/DL (ref 68–131)
GLUCOSE SERPL-MCNC: 132 MG/DL (ref 70–110)
HBA1C MFR BLD: 6 % (ref 4–5.6)
HCT VFR BLD AUTO: 45.8 % (ref 40–54)
HDLC SERPL-MCNC: 26 MG/DL (ref 40–75)
HDLC SERPL: 12.7 % (ref 20–50)
HGB BLD-MCNC: 15.5 G/DL (ref 14–18)
IMM GRANULOCYTES # BLD AUTO: 0.06 K/UL (ref 0–0.04)
IMM GRANULOCYTES NFR BLD AUTO: 0.6 % (ref 0–0.5)
INR PPP: 1 (ref 0.8–1.2)
LDLC SERPL CALC-MCNC: 132.8 MG/DL (ref 63–159)
LYMPHOCYTES # BLD AUTO: 3.1 K/UL (ref 1–4.8)
LYMPHOCYTES NFR BLD: 29.2 % (ref 18–48)
MCH RBC QN AUTO: 31.2 PG (ref 27–31)
MCHC RBC AUTO-ENTMCNC: 33.8 G/DL (ref 32–36)
MCV RBC AUTO: 92 FL (ref 82–98)
MONOCYTES # BLD AUTO: 0.9 K/UL (ref 0.3–1)
MONOCYTES NFR BLD: 8.4 % (ref 4–15)
NEUTROPHILS # BLD AUTO: 6 K/UL (ref 1.8–7.7)
NEUTROPHILS NFR BLD: 57.8 % (ref 38–73)
NONHDLC SERPL-MCNC: 178 MG/DL
NRBC BLD-RTO: 0 /100 WBC
PLATELET # BLD AUTO: 307 K/UL (ref 150–450)
PMV BLD AUTO: 9.8 FL (ref 9.2–12.9)
POTASSIUM SERPL-SCNC: 3.9 MMOL/L (ref 3.5–5.1)
PROT SERPL-MCNC: 6.7 G/DL (ref 6–8.4)
PROTHROMBIN TIME: 10.3 SEC (ref 9–12.5)
RBC # BLD AUTO: 4.97 M/UL (ref 4.6–6.2)
SARS-COV-2 RDRP RESP QL NAA+PROBE: NEGATIVE
SODIUM SERPL-SCNC: 138 MMOL/L (ref 136–145)
TRIGL SERPL-MCNC: 226 MG/DL (ref 30–150)
WBC # BLD AUTO: 10.44 K/UL (ref 3.9–12.7)

## 2021-07-25 PROCEDURE — 99223 PR INITIAL HOSPITAL CARE,LEVL III: ICD-10-PCS | Mod: ,,, | Performed by: INTERNAL MEDICINE

## 2021-07-25 PROCEDURE — 25000003 PHARM REV CODE 250: Performed by: EMERGENCY MEDICINE

## 2021-07-25 PROCEDURE — 96374 THER/PROPH/DIAG INJ IV PUSH: CPT

## 2021-07-25 PROCEDURE — 99291 CRITICAL CARE FIRST HOUR: CPT | Mod: 25

## 2021-07-25 PROCEDURE — 85025 COMPLETE CBC W/AUTO DIFF WBC: CPT | Performed by: EMERGENCY MEDICINE

## 2021-07-25 PROCEDURE — U0002 COVID-19 LAB TEST NON-CDC: HCPCS | Performed by: EMERGENCY MEDICINE

## 2021-07-25 PROCEDURE — 85610 PROTHROMBIN TIME: CPT | Performed by: EMERGENCY MEDICINE

## 2021-07-25 PROCEDURE — 63600175 PHARM REV CODE 636 W HCPCS: Performed by: EMERGENCY MEDICINE

## 2021-07-25 PROCEDURE — 80061 LIPID PANEL: CPT | Performed by: EMERGENCY MEDICINE

## 2021-07-25 PROCEDURE — 25000003 PHARM REV CODE 250: Performed by: NURSE PRACTITIONER

## 2021-07-25 PROCEDURE — 25500020 PHARM REV CODE 255: Performed by: EMERGENCY MEDICINE

## 2021-07-25 PROCEDURE — 99223 1ST HOSP IP/OBS HIGH 75: CPT | Mod: ,,, | Performed by: INTERNAL MEDICINE

## 2021-07-25 PROCEDURE — 85730 THROMBOPLASTIN TIME PARTIAL: CPT | Mod: 91 | Performed by: HOSPITALIST

## 2021-07-25 PROCEDURE — 80053 COMPREHEN METABOLIC PANEL: CPT | Performed by: EMERGENCY MEDICINE

## 2021-07-25 PROCEDURE — 83036 HEMOGLOBIN GLYCOSYLATED A1C: CPT | Performed by: EMERGENCY MEDICINE

## 2021-07-25 PROCEDURE — 11000001 HC ACUTE MED/SURG PRIVATE ROOM

## 2021-07-25 PROCEDURE — 36415 COLL VENOUS BLD VENIPUNCTURE: CPT | Performed by: HOSPITALIST

## 2021-07-25 PROCEDURE — 85730 THROMBOPLASTIN TIME PARTIAL: CPT | Performed by: EMERGENCY MEDICINE

## 2021-07-25 RX ORDER — CLOPIDOGREL BISULFATE 75 MG/1
75 TABLET ORAL DAILY
Status: DISCONTINUED | OUTPATIENT
Start: 2021-07-25 | End: 2021-07-27 | Stop reason: HOSPADM

## 2021-07-25 RX ORDER — ACETAMINOPHEN 325 MG/1
650 TABLET ORAL EVERY 4 HOURS PRN
Status: DISCONTINUED | OUTPATIENT
Start: 2021-07-25 | End: 2021-07-27 | Stop reason: HOSPADM

## 2021-07-25 RX ORDER — SODIUM CHLORIDE 0.9 % (FLUSH) 0.9 %
10 SYRINGE (ML) INJECTION
Status: DISCONTINUED | OUTPATIENT
Start: 2021-07-25 | End: 2021-07-27 | Stop reason: HOSPADM

## 2021-07-25 RX ORDER — CLOPIDOGREL BISULFATE 75 MG/1
75 TABLET ORAL DAILY
COMMUNITY
End: 2021-10-30 | Stop reason: SDUPTHER

## 2021-07-25 RX ORDER — HEPARIN SODIUM,PORCINE/D5W 25000/250
0-40 INTRAVENOUS SOLUTION INTRAVENOUS CONTINUOUS
Status: DISCONTINUED | OUTPATIENT
Start: 2021-07-25 | End: 2021-07-27

## 2021-07-25 RX ORDER — ONDANSETRON 2 MG/ML
4 INJECTION INTRAMUSCULAR; INTRAVENOUS EVERY 8 HOURS PRN
Status: DISCONTINUED | OUTPATIENT
Start: 2021-07-25 | End: 2021-07-27 | Stop reason: HOSPADM

## 2021-07-25 RX ORDER — ATORVASTATIN CALCIUM 40 MG/1
80 TABLET, FILM COATED ORAL NIGHTLY
Status: DISCONTINUED | OUTPATIENT
Start: 2021-07-25 | End: 2021-07-27 | Stop reason: HOSPADM

## 2021-07-25 RX ORDER — TALC
6 POWDER (GRAM) TOPICAL NIGHTLY PRN
Status: DISCONTINUED | OUTPATIENT
Start: 2021-07-25 | End: 2021-07-27 | Stop reason: HOSPADM

## 2021-07-25 RX ORDER — ASPIRIN 325 MG
325 TABLET ORAL
Status: COMPLETED | OUTPATIENT
Start: 2021-07-25 | End: 2021-07-25

## 2021-07-25 RX ADMIN — CLOPIDOGREL 75 MG: 75 TABLET, FILM COATED ORAL at 01:07

## 2021-07-25 RX ADMIN — ATORVASTATIN CALCIUM 80 MG: 40 TABLET, FILM COATED ORAL at 09:07

## 2021-07-25 RX ADMIN — IOHEXOL 150 ML: 350 INJECTION, SOLUTION INTRAVENOUS at 09:07

## 2021-07-25 RX ADMIN — HEPARIN SODIUM 12 UNITS/KG/HR: 10000 INJECTION, SOLUTION INTRAVENOUS at 01:07

## 2021-07-25 RX ADMIN — ASPIRIN 325 MG ORAL TABLET 325 MG: 325 PILL ORAL at 08:07

## 2021-07-25 RX ADMIN — HEPARIN SODIUM 15 UNITS/KG/HR: 10000 INJECTION, SOLUTION INTRAVENOUS at 09:07

## 2021-07-26 PROBLEM — I70.229 CRITICAL LOWER LIMB ISCHEMIA: Status: ACTIVE | Noted: 2021-07-26

## 2021-07-26 LAB
ANION GAP SERPL CALC-SCNC: 11 MMOL/L (ref 8–16)
ANION GAP SERPL CALC-SCNC: 9 MMOL/L (ref 8–16)
APTT BLDCRRT: 40.8 SEC (ref 21–32)
APTT BLDCRRT: 42.9 SEC (ref 21–32)
BASOPHILS # BLD AUTO: 0.09 K/UL (ref 0–0.2)
BASOPHILS NFR BLD: 0.9 % (ref 0–1.9)
BUN SERPL-MCNC: 11 MG/DL (ref 6–20)
BUN SERPL-MCNC: 13 MG/DL (ref 6–20)
CALCIUM SERPL-MCNC: 8.2 MG/DL (ref 8.7–10.5)
CALCIUM SERPL-MCNC: 8.9 MG/DL (ref 8.7–10.5)
CHLORIDE SERPL-SCNC: 103 MMOL/L (ref 95–110)
CHLORIDE SERPL-SCNC: 103 MMOL/L (ref 95–110)
CO2 SERPL-SCNC: 22 MMOL/L (ref 23–29)
CO2 SERPL-SCNC: 22 MMOL/L (ref 23–29)
CREAT SERPL-MCNC: 0.9 MG/DL (ref 0.5–1.4)
CREAT SERPL-MCNC: 1 MG/DL (ref 0.5–1.4)
DIFFERENTIAL METHOD: ABNORMAL
EOSINOPHIL # BLD AUTO: 0.3 K/UL (ref 0–0.5)
EOSINOPHIL NFR BLD: 3.3 % (ref 0–8)
ERYTHROCYTE [DISTWIDTH] IN BLOOD BY AUTOMATED COUNT: 13.6 % (ref 11.5–14.5)
EST. GFR  (AFRICAN AMERICAN): >60 ML/MIN/1.73 M^2
EST. GFR  (AFRICAN AMERICAN): >60 ML/MIN/1.73 M^2
EST. GFR  (NON AFRICAN AMERICAN): >60 ML/MIN/1.73 M^2
EST. GFR  (NON AFRICAN AMERICAN): >60 ML/MIN/1.73 M^2
GLUCOSE SERPL-MCNC: 102 MG/DL (ref 70–110)
GLUCOSE SERPL-MCNC: 106 MG/DL (ref 70–110)
HCT VFR BLD AUTO: 46.1 % (ref 40–54)
HGB BLD-MCNC: 15.3 G/DL (ref 14–18)
IMM GRANULOCYTES # BLD AUTO: 0.05 K/UL (ref 0–0.04)
IMM GRANULOCYTES NFR BLD AUTO: 0.5 % (ref 0–0.5)
LYMPHOCYTES # BLD AUTO: 3.2 K/UL (ref 1–4.8)
LYMPHOCYTES NFR BLD: 31.2 % (ref 18–48)
MAGNESIUM SERPL-MCNC: 2.1 MG/DL (ref 1.6–2.6)
MCH RBC QN AUTO: 30.2 PG (ref 27–31)
MCHC RBC AUTO-ENTMCNC: 33.2 G/DL (ref 32–36)
MCV RBC AUTO: 91 FL (ref 82–98)
MONOCYTES # BLD AUTO: 0.6 K/UL (ref 0.3–1)
MONOCYTES NFR BLD: 6.1 % (ref 4–15)
NEUTROPHILS # BLD AUTO: 6 K/UL (ref 1.8–7.7)
NEUTROPHILS NFR BLD: 58 % (ref 38–73)
NRBC BLD-RTO: 0 /100 WBC
PHOSPHATE SERPL-MCNC: 4.3 MG/DL (ref 2.7–4.5)
PLATELET # BLD AUTO: 298 K/UL (ref 150–450)
PMV BLD AUTO: 9.9 FL (ref 9.2–12.9)
POTASSIUM SERPL-SCNC: 4 MMOL/L (ref 3.5–5.1)
POTASSIUM SERPL-SCNC: 4.2 MMOL/L (ref 3.5–5.1)
RBC # BLD AUTO: 5.07 M/UL (ref 4.6–6.2)
SODIUM SERPL-SCNC: 134 MMOL/L (ref 136–145)
SODIUM SERPL-SCNC: 136 MMOL/L (ref 136–145)
WBC # BLD AUTO: 10.35 K/UL (ref 3.9–12.7)

## 2021-07-26 PROCEDURE — 37252 INTRVASC US NONCORONARY 1ST: CPT | Mod: LT,,, | Performed by: INTERNAL MEDICINE

## 2021-07-26 PROCEDURE — 99153 MOD SED SAME PHYS/QHP EA: CPT | Performed by: INTERNAL MEDICINE

## 2021-07-26 PROCEDURE — C1769 GUIDE WIRE: HCPCS | Performed by: INTERNAL MEDICINE

## 2021-07-26 PROCEDURE — 37221 PR REVASCULARIZE ILIAC ARTERY,ANGIOPLASTY/STENT, INITIAL VESSEL: ICD-10-PCS | Mod: 50,,, | Performed by: INTERNAL MEDICINE

## 2021-07-26 PROCEDURE — 37184 PR THROMBECTOMY, NON CORONARY, 1ST VESSEL: ICD-10-PCS | Mod: LT,,, | Performed by: INTERNAL MEDICINE

## 2021-07-26 PROCEDURE — 84100 ASSAY OF PHOSPHORUS: CPT | Performed by: NURSE PRACTITIONER

## 2021-07-26 PROCEDURE — 75625 CONTRAST EXAM ABDOMINL AORTA: CPT | Performed by: INTERNAL MEDICINE

## 2021-07-26 PROCEDURE — C1887 CATHETER, GUIDING: HCPCS | Performed by: INTERNAL MEDICINE

## 2021-07-26 PROCEDURE — 25500020 PHARM REV CODE 255: Performed by: INTERNAL MEDICINE

## 2021-07-26 PROCEDURE — 99152 MOD SED SAME PHYS/QHP 5/>YRS: CPT | Performed by: INTERNAL MEDICINE

## 2021-07-26 PROCEDURE — 75716 PR  ANGIO EXTERMITY BILAT: ICD-10-PCS | Mod: 26,59,, | Performed by: INTERNAL MEDICINE

## 2021-07-26 PROCEDURE — 11000001 HC ACUTE MED/SURG PRIVATE ROOM

## 2021-07-26 PROCEDURE — 37184 PRIM ART M-THRMBC 1ST VSL: CPT | Mod: LT | Performed by: INTERNAL MEDICINE

## 2021-07-26 PROCEDURE — 63600175 PHARM REV CODE 636 W HCPCS: Performed by: INTERNAL MEDICINE

## 2021-07-26 PROCEDURE — 37222 HC ILIAC REVASC ADD-ON: CPT | Mod: 50 | Performed by: INTERNAL MEDICINE

## 2021-07-26 PROCEDURE — C1757 CATH, THROMBECTOMY/EMBOLECT: HCPCS | Performed by: INTERNAL MEDICINE

## 2021-07-26 PROCEDURE — 63600175 PHARM REV CODE 636 W HCPCS: Performed by: EMERGENCY MEDICINE

## 2021-07-26 PROCEDURE — 83735 ASSAY OF MAGNESIUM: CPT | Performed by: NURSE PRACTITIONER

## 2021-07-26 PROCEDURE — 25000003 PHARM REV CODE 250: Performed by: NURSE PRACTITIONER

## 2021-07-26 PROCEDURE — 75625 PR  ANGIO AORTOGRAM ABD SERIAL: ICD-10-PCS | Mod: 26,59,, | Performed by: INTERNAL MEDICINE

## 2021-07-26 PROCEDURE — C1876 STENT, NON-COA/NON-COV W/DEL: HCPCS | Performed by: INTERNAL MEDICINE

## 2021-07-26 PROCEDURE — 25000003 PHARM REV CODE 250: Performed by: INTERNAL MEDICINE

## 2021-07-26 PROCEDURE — 36415 COLL VENOUS BLD VENIPUNCTURE: CPT | Performed by: INTERNAL MEDICINE

## 2021-07-26 PROCEDURE — 80048 BASIC METABOLIC PNL TOTAL CA: CPT | Mod: 91 | Performed by: INTERNAL MEDICINE

## 2021-07-26 PROCEDURE — C1760 CLOSURE DEV, VASC: HCPCS | Performed by: INTERNAL MEDICINE

## 2021-07-26 PROCEDURE — 37222 PR REVASCULARIZE ILIAC ARTERY,ANGIOPLASTY, EA ADD VESSEL: ICD-10-PCS | Mod: 50,,, | Performed by: INTERNAL MEDICINE

## 2021-07-26 PROCEDURE — 75716 ARTERY X-RAYS ARMS/LEGS: CPT | Mod: 26,59,, | Performed by: INTERNAL MEDICINE

## 2021-07-26 PROCEDURE — 99152 MOD SED SAME PHYS/QHP 5/>YRS: CPT | Mod: ,,, | Performed by: INTERNAL MEDICINE

## 2021-07-26 PROCEDURE — 36415 COLL VENOUS BLD VENIPUNCTURE: CPT | Performed by: HOSPITALIST

## 2021-07-26 PROCEDURE — 85025 COMPLETE CBC W/AUTO DIFF WBC: CPT | Performed by: NURSE PRACTITIONER

## 2021-07-26 PROCEDURE — C1753 CATH, INTRAVAS ULTRASOUND: HCPCS | Performed by: INTERNAL MEDICINE

## 2021-07-26 PROCEDURE — 37252 INTRVASC US NONCORONARY 1ST: CPT | Mod: LT | Performed by: INTERNAL MEDICINE

## 2021-07-26 PROCEDURE — 27201423 OPTIME MED/SURG SUP & DEVICES STERILE SUPPLY: Performed by: INTERNAL MEDICINE

## 2021-07-26 PROCEDURE — 37221 PR REVASCULARIZE ILIAC ARTERY,ANGIOPLASTY/STENT, INITIAL VESSEL: CPT | Mod: 50,,, | Performed by: INTERNAL MEDICINE

## 2021-07-26 PROCEDURE — 37222 PR REVASCULARIZE ILIAC ARTERY,ANGIOPLASTY, EA ADD VESSEL: CPT | Mod: 50,,, | Performed by: INTERNAL MEDICINE

## 2021-07-26 PROCEDURE — 80048 BASIC METABOLIC PNL TOTAL CA: CPT | Performed by: NURSE PRACTITIONER

## 2021-07-26 PROCEDURE — 25000003 PHARM REV CODE 250: Performed by: EMERGENCY MEDICINE

## 2021-07-26 PROCEDURE — 37221 HC ILIAC REVASC W/STENT: CPT | Mod: 50 | Performed by: INTERNAL MEDICINE

## 2021-07-26 PROCEDURE — 99152 PR MOD CONSCIOUS SEDATION, SAME PHYS, 5+ YRS, FIRST 15 MIN: ICD-10-PCS | Mod: ,,, | Performed by: INTERNAL MEDICINE

## 2021-07-26 PROCEDURE — C1894 INTRO/SHEATH, NON-LASER: HCPCS | Performed by: INTERNAL MEDICINE

## 2021-07-26 PROCEDURE — 75625 CONTRAST EXAM ABDOMINL AORTA: CPT | Mod: 26,59,, | Performed by: INTERNAL MEDICINE

## 2021-07-26 PROCEDURE — 85730 THROMBOPLASTIN TIME PARTIAL: CPT | Mod: 91 | Performed by: HOSPITALIST

## 2021-07-26 PROCEDURE — 85347 COAGULATION TIME ACTIVATED: CPT | Performed by: INTERNAL MEDICINE

## 2021-07-26 PROCEDURE — 36415 COLL VENOUS BLD VENIPUNCTURE: CPT | Performed by: NURSE PRACTITIONER

## 2021-07-26 PROCEDURE — C1725 CATH, TRANSLUMIN NON-LASER: HCPCS | Performed by: INTERNAL MEDICINE

## 2021-07-26 PROCEDURE — 37252 PR IVUS, NON-CORONARY, 1ST VESSEL: ICD-10-PCS | Mod: LT,,, | Performed by: INTERNAL MEDICINE

## 2021-07-26 PROCEDURE — 37184 PRIM ART M-THRMBC 1ST VSL: CPT | Mod: LT,,, | Performed by: INTERNAL MEDICINE

## 2021-07-26 PROCEDURE — 75716 ARTERY X-RAYS ARMS/LEGS: CPT | Performed by: INTERNAL MEDICINE

## 2021-07-26 DEVICE — LIFESTAR™ VASCULAR AND BILIARY STENT SYSTEM 8 MM X 60 MM (135 CM DELIVERY CATHETER)
Type: IMPLANTABLE DEVICE | Site: LEG | Status: FUNCTIONAL
Brand: LIFESTAR® VASCULAR & BILIARY STENT

## 2021-07-26 RX ORDER — LIDOCAINE HYDROCHLORIDE 10 MG/ML
INJECTION, SOLUTION EPIDURAL; INFILTRATION; INTRACAUDAL; PERINEURAL
Status: DISCONTINUED | OUTPATIENT
Start: 2021-07-26 | End: 2021-07-26 | Stop reason: HOSPADM

## 2021-07-26 RX ORDER — DIPHENHYDRAMINE HYDROCHLORIDE 50 MG/ML
INJECTION INTRAMUSCULAR; INTRAVENOUS
Status: DISCONTINUED | OUTPATIENT
Start: 2021-07-26 | End: 2021-07-26 | Stop reason: HOSPADM

## 2021-07-26 RX ORDER — HEPARIN SODIUM 1000 [USP'U]/ML
INJECTION, SOLUTION INTRAVENOUS; SUBCUTANEOUS
Status: DISCONTINUED | OUTPATIENT
Start: 2021-07-26 | End: 2021-07-26 | Stop reason: HOSPADM

## 2021-07-26 RX ORDER — VANCOMYCIN HYDROCHLORIDE 1 G/20ML
INJECTION, POWDER, LYOPHILIZED, FOR SOLUTION INTRAVENOUS
Status: DISCONTINUED | OUTPATIENT
Start: 2021-07-26 | End: 2021-07-26 | Stop reason: HOSPADM

## 2021-07-26 RX ORDER — SODIUM CHLORIDE 9 MG/ML
INJECTION, SOLUTION INTRAVENOUS
Status: DISCONTINUED | OUTPATIENT
Start: 2021-07-26 | End: 2021-07-27 | Stop reason: HOSPADM

## 2021-07-26 RX ORDER — MIDAZOLAM HYDROCHLORIDE 1 MG/ML
INJECTION, SOLUTION INTRAMUSCULAR; INTRAVENOUS
Status: DISCONTINUED | OUTPATIENT
Start: 2021-07-26 | End: 2021-07-26 | Stop reason: HOSPADM

## 2021-07-26 RX ORDER — VERAPAMIL HYDROCHLORIDE 2.5 MG/ML
INJECTION, SOLUTION INTRAVENOUS
Status: DISCONTINUED | OUTPATIENT
Start: 2021-07-26 | End: 2021-07-26 | Stop reason: HOSPADM

## 2021-07-26 RX ORDER — FENTANYL CITRATE 50 UG/ML
INJECTION, SOLUTION INTRAMUSCULAR; INTRAVENOUS
Status: DISCONTINUED | OUTPATIENT
Start: 2021-07-26 | End: 2021-07-26 | Stop reason: HOSPADM

## 2021-07-26 RX ORDER — IODIXANOL 320 MG/ML
INJECTION, SOLUTION INTRAVASCULAR
Status: DISCONTINUED | OUTPATIENT
Start: 2021-07-26 | End: 2021-07-26 | Stop reason: HOSPADM

## 2021-07-26 RX ADMIN — CLOPIDOGREL 75 MG: 75 TABLET, FILM COATED ORAL at 08:07

## 2021-07-26 RX ADMIN — ATORVASTATIN CALCIUM 80 MG: 40 TABLET, FILM COATED ORAL at 08:07

## 2021-07-26 RX ADMIN — ACETAMINOPHEN 650 MG: 325 TABLET ORAL at 03:07

## 2021-07-26 RX ADMIN — HEPARIN SODIUM 15 UNITS/KG/HR: 10000 INJECTION, SOLUTION INTRAVENOUS at 02:07

## 2021-07-26 RX ADMIN — HEPARIN SODIUM 14.9 UNITS/KG/HR: 10000 INJECTION, SOLUTION INTRAVENOUS at 09:07

## 2021-07-27 VITALS
TEMPERATURE: 99 F | WEIGHT: 218.25 LBS | BODY MASS INDEX: 29.56 KG/M2 | OXYGEN SATURATION: 96 % | HEIGHT: 72 IN | HEART RATE: 98 BPM | DIASTOLIC BLOOD PRESSURE: 81 MMHG | SYSTOLIC BLOOD PRESSURE: 139 MMHG | RESPIRATION RATE: 20 BRPM

## 2021-07-27 LAB
APTT BLDCRRT: 34.4 SEC (ref 21–32)
APTT BLDCRRT: 41.6 SEC (ref 21–32)

## 2021-07-27 PROCEDURE — 99232 PR SUBSEQUENT HOSPITAL CARE,LEVL II: ICD-10-PCS | Mod: ,,, | Performed by: NURSE PRACTITIONER

## 2021-07-27 PROCEDURE — 36415 COLL VENOUS BLD VENIPUNCTURE: CPT | Performed by: HOSPITALIST

## 2021-07-27 PROCEDURE — 99232 SBSQ HOSP IP/OBS MODERATE 35: CPT | Mod: ,,, | Performed by: NURSE PRACTITIONER

## 2021-07-27 PROCEDURE — 85730 THROMBOPLASTIN TIME PARTIAL: CPT | Mod: 91 | Performed by: HOSPITALIST

## 2021-07-27 PROCEDURE — 25000003 PHARM REV CODE 250: Performed by: NURSE PRACTITIONER

## 2021-07-27 PROCEDURE — 25000003 PHARM REV CODE 250: Performed by: EMERGENCY MEDICINE

## 2021-07-27 RX ORDER — KETOROLAC TROMETHAMINE 30 MG/ML
15 INJECTION, SOLUTION INTRAMUSCULAR; INTRAVENOUS ONCE
Status: DISCONTINUED | OUTPATIENT
Start: 2021-07-27 | End: 2021-07-27 | Stop reason: HOSPADM

## 2021-07-27 RX ORDER — TRAMADOL HYDROCHLORIDE 50 MG/1
50 TABLET ORAL EVERY 4 HOURS PRN
Status: DISCONTINUED | OUTPATIENT
Start: 2021-07-27 | End: 2021-07-27 | Stop reason: HOSPADM

## 2021-07-27 RX ADMIN — CLOPIDOGREL 75 MG: 75 TABLET, FILM COATED ORAL at 08:07

## 2021-07-27 RX ADMIN — TRAMADOL HYDROCHLORIDE 50 MG: 50 TABLET, FILM COATED ORAL at 04:07

## 2021-07-28 LAB
POC ACTIVATED CLOTTING TIME K: 180 SEC (ref 74–137)
POC ACTIVATED CLOTTING TIME K: 208 SEC (ref 74–137)
POC ACTIVATED CLOTTING TIME K: 230 SEC (ref 74–137)
SAMPLE: ABNORMAL

## 2021-07-29 ENCOUNTER — OFFICE VISIT (OUTPATIENT)
Dept: PRIMARY CARE CLINIC | Facility: CLINIC | Age: 51
End: 2021-07-29
Payer: MEDICAID

## 2021-07-29 VITALS
DIASTOLIC BLOOD PRESSURE: 82 MMHG | HEART RATE: 114 BPM | BODY MASS INDEX: 28.85 KG/M2 | OXYGEN SATURATION: 96 % | TEMPERATURE: 98 F | WEIGHT: 212.75 LBS | SYSTOLIC BLOOD PRESSURE: 107 MMHG

## 2021-07-29 DIAGNOSIS — I74.09 LERICHE SYNDROME: ICD-10-CM

## 2021-07-29 DIAGNOSIS — Z87.891 FORMER TOBACCO USE: ICD-10-CM

## 2021-07-29 DIAGNOSIS — I70.229 CRITICAL LOWER LIMB ISCHEMIA: Primary | ICD-10-CM

## 2021-07-29 DIAGNOSIS — E11.59 TYPE 2 DIABETES MELLITUS WITH OTHER CIRCULATORY COMPLICATION, WITHOUT LONG-TERM CURRENT USE OF INSULIN: ICD-10-CM

## 2021-07-29 DIAGNOSIS — I73.9 PAD (PERIPHERAL ARTERY DISEASE): ICD-10-CM

## 2021-07-29 PROBLEM — I99.8 ISCHEMIC TOE: Status: RESOLVED | Noted: 2020-10-04 | Resolved: 2021-07-29

## 2021-07-29 PROBLEM — I96 GANGRENOUS TOE: Status: RESOLVED | Noted: 2020-10-15 | Resolved: 2021-07-29

## 2021-07-29 PROCEDURE — 99999 PR PBB SHADOW E&M-EST. PATIENT-LVL IV: ICD-10-PCS | Mod: PBBFAC,,, | Performed by: INTERNAL MEDICINE

## 2021-07-29 PROCEDURE — 99205 OFFICE O/P NEW HI 60 MIN: CPT | Mod: S$PBB,,, | Performed by: INTERNAL MEDICINE

## 2021-07-29 PROCEDURE — 99205 PR OFFICE/OUTPT VISIT, NEW, LEVL V, 60-74 MIN: ICD-10-PCS | Mod: S$PBB,,, | Performed by: INTERNAL MEDICINE

## 2021-07-29 PROCEDURE — 99999 PR PBB SHADOW E&M-EST. PATIENT-LVL IV: CPT | Mod: PBBFAC,,, | Performed by: INTERNAL MEDICINE

## 2021-07-29 PROCEDURE — 99214 OFFICE O/P EST MOD 30 MIN: CPT | Mod: PBBFAC,PO | Performed by: INTERNAL MEDICINE

## 2021-08-09 ENCOUNTER — TELEPHONE (OUTPATIENT)
Dept: CARDIOLOGY | Facility: CLINIC | Age: 51
End: 2021-08-09

## 2021-08-09 ENCOUNTER — TELEPHONE (OUTPATIENT)
Dept: PRIMARY CARE CLINIC | Facility: CLINIC | Age: 51
End: 2021-08-09

## 2021-08-12 ENCOUNTER — OFFICE VISIT (OUTPATIENT)
Dept: CARDIOLOGY | Facility: CLINIC | Age: 51
End: 2021-08-12
Payer: MEDICAID

## 2021-08-12 VITALS
HEART RATE: 106 BPM | HEIGHT: 72 IN | DIASTOLIC BLOOD PRESSURE: 84 MMHG | WEIGHT: 207 LBS | BODY MASS INDEX: 28.04 KG/M2 | SYSTOLIC BLOOD PRESSURE: 114 MMHG | OXYGEN SATURATION: 97 %

## 2021-08-12 DIAGNOSIS — Z87.891 FORMER TOBACCO USE: ICD-10-CM

## 2021-08-12 DIAGNOSIS — I73.9 PAD (PERIPHERAL ARTERY DISEASE): Primary | ICD-10-CM

## 2021-08-12 DIAGNOSIS — I70.203 ATHEROSCLEROSIS OF ARTERY OF BOTH LOWER EXTREMITIES: ICD-10-CM

## 2021-08-12 DIAGNOSIS — I74.09 LERICHE SYNDROME: ICD-10-CM

## 2021-08-12 DIAGNOSIS — I70.0 AORTIC OCCLUSION: ICD-10-CM

## 2021-08-12 DIAGNOSIS — T82.868A: ICD-10-CM

## 2021-08-12 DIAGNOSIS — I70.229 CRITICAL LOWER LIMB ISCHEMIA: ICD-10-CM

## 2021-08-12 DIAGNOSIS — I70.211 ATHEROSCLEROSIS OF NATIVE ARTERY OF RIGHT LOWER EXTREMITY WITH INTERMITTENT CLAUDICATION: ICD-10-CM

## 2021-08-12 PROCEDURE — 99214 OFFICE O/P EST MOD 30 MIN: CPT | Mod: PBBFAC,PO | Performed by: INTERNAL MEDICINE

## 2021-08-12 PROCEDURE — 99214 PR OFFICE/OUTPT VISIT, EST, LEVL IV, 30-39 MIN: ICD-10-PCS | Mod: S$PBB,,, | Performed by: INTERNAL MEDICINE

## 2021-08-12 PROCEDURE — 99999 PR PBB SHADOW E&M-EST. PATIENT-LVL IV: CPT | Mod: PBBFAC,,, | Performed by: INTERNAL MEDICINE

## 2021-08-12 PROCEDURE — 99214 OFFICE O/P EST MOD 30 MIN: CPT | Mod: S$PBB,,, | Performed by: INTERNAL MEDICINE

## 2021-08-12 PROCEDURE — 99999 PR PBB SHADOW E&M-EST. PATIENT-LVL IV: ICD-10-PCS | Mod: PBBFAC,,, | Performed by: INTERNAL MEDICINE

## 2021-08-23 ENCOUNTER — HOSPITAL ENCOUNTER (OUTPATIENT)
Dept: RADIOLOGY | Facility: HOSPITAL | Age: 51
Discharge: HOME | End: 2021-08-23
Attending: INTERNAL MEDICINE
Payer: MEDICAID

## 2021-08-23 DIAGNOSIS — I70.229 CRITICAL LOWER LIMB ISCHEMIA: ICD-10-CM

## 2021-08-23 PROCEDURE — 93925 US LOWER EXTREMITY ARTERIES BILATERAL: ICD-10-PCS | Mod: 26,,, | Performed by: RADIOLOGY

## 2021-08-23 PROCEDURE — 93925 LOWER EXTREMITY STUDY: CPT | Mod: TC

## 2021-08-23 PROCEDURE — 93925 LOWER EXTREMITY STUDY: CPT | Mod: 26,,, | Performed by: RADIOLOGY

## 2021-09-03 ENCOUNTER — HOSPITAL ENCOUNTER (EMERGENCY)
Facility: HOSPITAL | Age: 51
Discharge: HOME | End: 2021-09-03
Attending: EMERGENCY MEDICINE
Payer: MEDICAID

## 2021-09-03 VITALS
WEIGHT: 200 LBS | DIASTOLIC BLOOD PRESSURE: 78 MMHG | SYSTOLIC BLOOD PRESSURE: 124 MMHG | HEART RATE: 80 BPM | RESPIRATION RATE: 17 BRPM | OXYGEN SATURATION: 100 % | TEMPERATURE: 98 F | BODY MASS INDEX: 27.09 KG/M2 | HEIGHT: 72 IN

## 2021-09-03 DIAGNOSIS — I70.229 CRITICAL LOWER LIMB ISCHEMIA: Primary | ICD-10-CM

## 2021-09-03 DIAGNOSIS — I73.9 PERIPHERAL ARTERY DISEASE: ICD-10-CM

## 2021-09-03 DIAGNOSIS — I74.09 LERICHE SYNDROME: ICD-10-CM

## 2021-09-03 LAB
ALBUMIN SERPL BCP-MCNC: 3.4 G/DL (ref 3.5–5.2)
ALP SERPL-CCNC: 112 U/L (ref 55–135)
ALT SERPL W/O P-5'-P-CCNC: 26 U/L (ref 10–44)
ANION GAP SERPL CALC-SCNC: 10 MMOL/L (ref 8–16)
APTT BLDCRRT: 31.8 SEC (ref 21–32)
AST SERPL-CCNC: 20 U/L (ref 10–40)
BASOPHILS # BLD AUTO: 0.06 K/UL (ref 0–0.2)
BASOPHILS NFR BLD: 0.7 % (ref 0–1.9)
BILIRUB SERPL-MCNC: 0.5 MG/DL (ref 0.1–1)
BUN SERPL-MCNC: 11 MG/DL (ref 6–20)
CALCIUM SERPL-MCNC: 8.4 MG/DL (ref 8.7–10.5)
CHLORIDE SERPL-SCNC: 111 MMOL/L (ref 95–110)
CO2 SERPL-SCNC: 18 MMOL/L (ref 23–29)
CREAT SERPL-MCNC: 0.9 MG/DL (ref 0.5–1.4)
DIFFERENTIAL METHOD: ABNORMAL
EOSINOPHIL # BLD AUTO: 0.2 K/UL (ref 0–0.5)
EOSINOPHIL NFR BLD: 1.8 % (ref 0–8)
ERYTHROCYTE [DISTWIDTH] IN BLOOD BY AUTOMATED COUNT: 13.3 % (ref 11.5–14.5)
EST. GFR  (AFRICAN AMERICAN): >60 ML/MIN/1.73 M^2
EST. GFR  (NON AFRICAN AMERICAN): >60 ML/MIN/1.73 M^2
GLUCOSE SERPL-MCNC: 120 MG/DL (ref 70–110)
HCT VFR BLD AUTO: 41.2 % (ref 40–54)
HGB BLD-MCNC: 13.9 G/DL (ref 14–18)
IMM GRANULOCYTES # BLD AUTO: 0.03 K/UL (ref 0–0.04)
IMM GRANULOCYTES NFR BLD AUTO: 0.4 % (ref 0–0.5)
INR PPP: 1.1 (ref 0.8–1.2)
LYMPHOCYTES # BLD AUTO: 2.3 K/UL (ref 1–4.8)
LYMPHOCYTES NFR BLD: 28.1 % (ref 18–48)
MAGNESIUM SERPL-MCNC: 2.1 MG/DL (ref 1.6–2.6)
MCH RBC QN AUTO: 30.9 PG (ref 27–31)
MCHC RBC AUTO-ENTMCNC: 33.7 G/DL (ref 32–36)
MCV RBC AUTO: 92 FL (ref 82–98)
MONOCYTES # BLD AUTO: 0.6 K/UL (ref 0.3–1)
MONOCYTES NFR BLD: 7.2 % (ref 4–15)
NEUTROPHILS # BLD AUTO: 5 K/UL (ref 1.8–7.7)
NEUTROPHILS NFR BLD: 61.8 % (ref 38–73)
NRBC BLD-RTO: 0 /100 WBC
PLATELET # BLD AUTO: 265 K/UL (ref 150–450)
PMV BLD AUTO: 9.9 FL (ref 9.2–12.9)
POTASSIUM SERPL-SCNC: 3.7 MMOL/L (ref 3.5–5.1)
PROT SERPL-MCNC: 6.3 G/DL (ref 6–8.4)
PROTHROMBIN TIME: 11.9 SEC (ref 9–12.5)
RBC # BLD AUTO: 4.5 M/UL (ref 4.6–6.2)
SODIUM SERPL-SCNC: 139 MMOL/L (ref 136–145)
WBC # BLD AUTO: 8.11 K/UL (ref 3.9–12.7)

## 2021-09-03 PROCEDURE — 80053 COMPREHEN METABOLIC PANEL: CPT | Performed by: NURSE PRACTITIONER

## 2021-09-03 PROCEDURE — 83735 ASSAY OF MAGNESIUM: CPT | Performed by: NURSE PRACTITIONER

## 2021-09-03 PROCEDURE — 85025 COMPLETE CBC W/AUTO DIFF WBC: CPT | Performed by: NURSE PRACTITIONER

## 2021-09-03 PROCEDURE — 99285 EMERGENCY DEPT VISIT HI MDM: CPT

## 2021-09-03 PROCEDURE — 25500020 PHARM REV CODE 255: Performed by: EMERGENCY MEDICINE

## 2021-09-03 PROCEDURE — 85610 PROTHROMBIN TIME: CPT | Performed by: EMERGENCY MEDICINE

## 2021-09-03 PROCEDURE — 85730 THROMBOPLASTIN TIME PARTIAL: CPT | Performed by: EMERGENCY MEDICINE

## 2021-09-03 RX ADMIN — IOHEXOL 150 ML: 350 INJECTION, SOLUTION INTRAVENOUS at 01:09

## 2021-09-08 ENCOUNTER — OFFICE VISIT (OUTPATIENT)
Dept: CARDIOLOGY | Facility: CLINIC | Age: 51
End: 2021-09-08
Payer: MEDICAID

## 2021-09-08 VITALS
BODY MASS INDEX: 27.53 KG/M2 | OXYGEN SATURATION: 97 % | HEIGHT: 72 IN | SYSTOLIC BLOOD PRESSURE: 112 MMHG | DIASTOLIC BLOOD PRESSURE: 79 MMHG | WEIGHT: 203.25 LBS | HEART RATE: 83 BPM

## 2021-09-08 DIAGNOSIS — Z87.891 FORMER TOBACCO USE: ICD-10-CM

## 2021-09-08 DIAGNOSIS — I70.211 ATHEROSCLEROSIS OF NATIVE ARTERY OF RIGHT LOWER EXTREMITY WITH INTERMITTENT CLAUDICATION: ICD-10-CM

## 2021-09-08 DIAGNOSIS — I70.203 ATHEROSCLEROSIS OF ARTERY OF BOTH LOWER EXTREMITIES: ICD-10-CM

## 2021-09-08 DIAGNOSIS — T82.868D ARTERIAL STENT THROMBOSIS, SUBSEQUENT ENCOUNTER: ICD-10-CM

## 2021-09-08 DIAGNOSIS — I74.09 LERICHE SYNDROME: ICD-10-CM

## 2021-09-08 DIAGNOSIS — I73.9 PAD (PERIPHERAL ARTERY DISEASE): Primary | ICD-10-CM

## 2021-09-08 PROCEDURE — 99999 PR PBB SHADOW E&M-EST. PATIENT-LVL III: ICD-10-PCS | Mod: PBBFAC,,, | Performed by: INTERNAL MEDICINE

## 2021-09-08 PROCEDURE — 99999 PR PBB SHADOW E&M-EST. PATIENT-LVL III: CPT | Mod: PBBFAC,,, | Performed by: INTERNAL MEDICINE

## 2021-09-08 PROCEDURE — 99213 OFFICE O/P EST LOW 20 MIN: CPT | Mod: PBBFAC,PO | Performed by: INTERNAL MEDICINE

## 2021-09-08 PROCEDURE — 99214 PR OFFICE/OUTPT VISIT, EST, LEVL IV, 30-39 MIN: ICD-10-PCS | Mod: S$PBB,,, | Performed by: INTERNAL MEDICINE

## 2021-09-08 PROCEDURE — 99214 OFFICE O/P EST MOD 30 MIN: CPT | Mod: S$PBB,,, | Performed by: INTERNAL MEDICINE

## 2021-09-08 RX ORDER — SODIUM CHLORIDE 9 MG/ML
INJECTION, SOLUTION INTRAVENOUS CONTINUOUS
Status: CANCELLED | OUTPATIENT
Start: 2021-09-08 | End: 2021-09-08

## 2021-09-08 RX ORDER — DIPHENHYDRAMINE HCL 25 MG
50 CAPSULE ORAL ONCE
Status: CANCELLED | OUTPATIENT
Start: 2021-09-08 | End: 2021-09-08

## 2021-10-11 ENCOUNTER — TELEPHONE (OUTPATIENT)
Dept: CARDIOLOGY | Facility: CLINIC | Age: 51
End: 2021-10-11

## 2021-10-18 ENCOUNTER — LAB VISIT (OUTPATIENT)
Dept: FAMILY MEDICINE | Facility: CLINIC | Age: 51
End: 2021-10-18
Payer: MEDICAID

## 2021-10-18 DIAGNOSIS — Z01.810 PRE-OPERATIVE CARDIOVASCULAR EXAMINATION: ICD-10-CM

## 2021-10-18 PROCEDURE — U0003 INFECTIOUS AGENT DETECTION BY NUCLEIC ACID (DNA OR RNA); SEVERE ACUTE RESPIRATORY SYNDROME CORONAVIRUS 2 (SARS-COV-2) (CORONAVIRUS DISEASE [COVID-19]), AMPLIFIED PROBE TECHNIQUE, MAKING USE OF HIGH THROUGHPUT TECHNOLOGIES AS DESCRIBED BY CMS-2020-01-R: HCPCS | Performed by: INTERNAL MEDICINE

## 2021-10-18 PROCEDURE — U0005 INFEC AGEN DETEC AMPLI PROBE: HCPCS | Performed by: INTERNAL MEDICINE

## 2021-10-19 LAB
SARS-COV-2 RNA RESP QL NAA+PROBE: NOT DETECTED
SARS-COV-2- CYCLE NUMBER: NORMAL

## 2021-10-20 ENCOUNTER — HOSPITAL ENCOUNTER (OUTPATIENT)
Facility: HOSPITAL | Age: 51
Discharge: HOME | End: 2021-10-20
Attending: INTERNAL MEDICINE | Admitting: INTERNAL MEDICINE
Payer: MEDICAID

## 2021-10-20 VITALS
DIASTOLIC BLOOD PRESSURE: 63 MMHG | HEIGHT: 72 IN | SYSTOLIC BLOOD PRESSURE: 120 MMHG | BODY MASS INDEX: 27.09 KG/M2 | TEMPERATURE: 98 F | RESPIRATION RATE: 18 BRPM | OXYGEN SATURATION: 98 % | WEIGHT: 200 LBS | HEART RATE: 91 BPM

## 2021-10-20 DIAGNOSIS — Z01.810 PRE-OPERATIVE CARDIOVASCULAR EXAMINATION: ICD-10-CM

## 2021-10-20 DIAGNOSIS — I73.9 PAD (PERIPHERAL ARTERY DISEASE): Primary | ICD-10-CM

## 2021-10-20 DIAGNOSIS — T82.868D ARTERIAL STENT THROMBOSIS, SUBSEQUENT ENCOUNTER: ICD-10-CM

## 2021-10-20 LAB
ANION GAP SERPL CALC-SCNC: 10 MMOL/L (ref 8–16)
ANION GAP SERPL CALC-SCNC: 13 MMOL/L (ref 8–16)
BASOPHILS # BLD AUTO: 0.08 K/UL (ref 0–0.2)
BASOPHILS NFR BLD: 0.6 % (ref 0–1.9)
BUN SERPL-MCNC: 12 MG/DL (ref 6–20)
BUN SERPL-MCNC: 13 MG/DL (ref 6–20)
CALCIUM SERPL-MCNC: 8.5 MG/DL (ref 8.7–10.5)
CALCIUM SERPL-MCNC: 9 MG/DL (ref 8.7–10.5)
CHLORIDE SERPL-SCNC: 104 MMOL/L (ref 95–110)
CHLORIDE SERPL-SCNC: 104 MMOL/L (ref 95–110)
CO2 SERPL-SCNC: 20 MMOL/L (ref 23–29)
CO2 SERPL-SCNC: 24 MMOL/L (ref 23–29)
CREAT SERPL-MCNC: 1.1 MG/DL (ref 0.5–1.4)
CREAT SERPL-MCNC: 1.2 MG/DL (ref 0.5–1.4)
DIFFERENTIAL METHOD: ABNORMAL
EOSINOPHIL # BLD AUTO: 0.3 K/UL (ref 0–0.5)
EOSINOPHIL NFR BLD: 2 % (ref 0–8)
ERYTHROCYTE [DISTWIDTH] IN BLOOD BY AUTOMATED COUNT: 13.7 % (ref 11.5–14.5)
EST. GFR  (AFRICAN AMERICAN): >60 ML/MIN/1.73 M^2
EST. GFR  (AFRICAN AMERICAN): >60 ML/MIN/1.73 M^2
EST. GFR  (NON AFRICAN AMERICAN): >60 ML/MIN/1.73 M^2
EST. GFR  (NON AFRICAN AMERICAN): >60 ML/MIN/1.73 M^2
GLUCOSE SERPL-MCNC: 103 MG/DL (ref 70–110)
GLUCOSE SERPL-MCNC: 115 MG/DL (ref 70–110)
HCT VFR BLD AUTO: 49.5 % (ref 40–54)
HGB BLD-MCNC: 16.7 G/DL (ref 14–18)
IMM GRANULOCYTES # BLD AUTO: 0.05 K/UL (ref 0–0.04)
IMM GRANULOCYTES NFR BLD AUTO: 0.4 % (ref 0–0.5)
LYMPHOCYTES # BLD AUTO: 3.5 K/UL (ref 1–4.8)
LYMPHOCYTES NFR BLD: 26.4 % (ref 18–48)
MCH RBC QN AUTO: 30.3 PG (ref 27–31)
MCHC RBC AUTO-ENTMCNC: 33.7 G/DL (ref 32–36)
MCV RBC AUTO: 90 FL (ref 82–98)
MONOCYTES # BLD AUTO: 0.9 K/UL (ref 0.3–1)
MONOCYTES NFR BLD: 6.6 % (ref 4–15)
NEUTROPHILS # BLD AUTO: 8.5 K/UL (ref 1.8–7.7)
NEUTROPHILS NFR BLD: 64 % (ref 38–73)
NRBC BLD-RTO: 0 /100 WBC
PLATELET # BLD AUTO: 282 K/UL (ref 150–450)
PMV BLD AUTO: 10.2 FL (ref 9.2–12.9)
POTASSIUM SERPL-SCNC: 4.1 MMOL/L (ref 3.5–5.1)
POTASSIUM SERPL-SCNC: 4.7 MMOL/L (ref 3.5–5.1)
RBC # BLD AUTO: 5.51 M/UL (ref 4.6–6.2)
SODIUM SERPL-SCNC: 137 MMOL/L (ref 136–145)
SODIUM SERPL-SCNC: 138 MMOL/L (ref 136–145)
WBC # BLD AUTO: 13.24 K/UL (ref 3.9–12.7)

## 2021-10-20 PROCEDURE — C1725 CATH, TRANSLUMIN NON-LASER: HCPCS | Performed by: INTERNAL MEDICINE

## 2021-10-20 PROCEDURE — C1769 GUIDE WIRE: HCPCS | Performed by: INTERNAL MEDICINE

## 2021-10-20 PROCEDURE — 99152 PR MOD CONSCIOUS SEDATION, SAME PHYS, 5+ YRS, FIRST 15 MIN: ICD-10-PCS | Mod: ,,, | Performed by: INTERNAL MEDICINE

## 2021-10-20 PROCEDURE — 75710 PR  ANGIO EXTREMITY UNILAT: ICD-10-PCS | Mod: 26,59,RT, | Performed by: INTERNAL MEDICINE

## 2021-10-20 PROCEDURE — 99152 MOD SED SAME PHYS/QHP 5/>YRS: CPT | Mod: ,,, | Performed by: INTERNAL MEDICINE

## 2021-10-20 PROCEDURE — 99153 MOD SED SAME PHYS/QHP EA: CPT | Performed by: INTERNAL MEDICINE

## 2021-10-20 PROCEDURE — 75625 CONTRAST EXAM ABDOMINL AORTA: CPT | Mod: RT | Performed by: INTERNAL MEDICINE

## 2021-10-20 PROCEDURE — 37220 PR REVASCULARIZE ILIAC ARTERY,ANGIOPLASTY, INITIAL VESSEL: CPT | Mod: RT,,, | Performed by: INTERNAL MEDICINE

## 2021-10-20 PROCEDURE — 37252 INTRVASC US NONCORONARY 1ST: CPT | Mod: RT,,, | Performed by: INTERNAL MEDICINE

## 2021-10-20 PROCEDURE — 37220 HC ILIAC REVASC: CPT | Mod: RT | Performed by: INTERNAL MEDICINE

## 2021-10-20 PROCEDURE — C1887 CATHETER, GUIDING: HCPCS | Performed by: INTERNAL MEDICINE

## 2021-10-20 PROCEDURE — 80048 BASIC METABOLIC PNL TOTAL CA: CPT | Mod: 91 | Performed by: INTERNAL MEDICINE

## 2021-10-20 PROCEDURE — 25000003 PHARM REV CODE 250: Performed by: INTERNAL MEDICINE

## 2021-10-20 PROCEDURE — 0238T TRLUML PERIP ATHRC ILIAC ART: CPT | Mod: RT,,, | Performed by: INTERNAL MEDICINE

## 2021-10-20 PROCEDURE — 99152 MOD SED SAME PHYS/QHP 5/>YRS: CPT | Performed by: INTERNAL MEDICINE

## 2021-10-20 PROCEDURE — 85347 COAGULATION TIME ACTIVATED: CPT | Performed by: INTERNAL MEDICINE

## 2021-10-20 PROCEDURE — 75625 CONTRAST EXAM ABDOMINL AORTA: CPT | Mod: 26,,, | Performed by: INTERNAL MEDICINE

## 2021-10-20 PROCEDURE — 37220 PR REVASCULARIZE ILIAC ARTERY,ANGIOPLASTY, INITIAL VESSEL: ICD-10-PCS | Mod: RT,,, | Performed by: INTERNAL MEDICINE

## 2021-10-20 PROCEDURE — C1760 CLOSURE DEV, VASC: HCPCS | Performed by: INTERNAL MEDICINE

## 2021-10-20 PROCEDURE — 25500020 PHARM REV CODE 255: Performed by: INTERNAL MEDICINE

## 2021-10-20 PROCEDURE — 75710 ARTERY X-RAYS ARM/LEG: CPT | Mod: RT,59 | Performed by: INTERNAL MEDICINE

## 2021-10-20 PROCEDURE — 0238T PR TRANSLUMINAL PERIPHERAL ATHERECTOMY, ILIAC ARTERY, EACH: ICD-10-PCS | Mod: RT,,, | Performed by: INTERNAL MEDICINE

## 2021-10-20 PROCEDURE — 36415 COLL VENOUS BLD VENIPUNCTURE: CPT | Performed by: INTERNAL MEDICINE

## 2021-10-20 PROCEDURE — 85025 COMPLETE CBC W/AUTO DIFF WBC: CPT | Performed by: INTERNAL MEDICINE

## 2021-10-20 PROCEDURE — 63600175 PHARM REV CODE 636 W HCPCS: Performed by: INTERNAL MEDICINE

## 2021-10-20 PROCEDURE — 0238T TRLUML PERIP ATHRC ILIAC ART: CPT | Mod: RT | Performed by: INTERNAL MEDICINE

## 2021-10-20 PROCEDURE — 37252 INTRVASC US NONCORONARY 1ST: CPT | Mod: RT | Performed by: INTERNAL MEDICINE

## 2021-10-20 PROCEDURE — 75710 ARTERY X-RAYS ARM/LEG: CPT | Mod: 26,59,RT, | Performed by: INTERNAL MEDICINE

## 2021-10-20 PROCEDURE — 37252 PR IVUS, NON-CORONARY, 1ST VESSEL: ICD-10-PCS | Mod: RT,,, | Performed by: INTERNAL MEDICINE

## 2021-10-20 PROCEDURE — C1894 INTRO/SHEATH, NON-LASER: HCPCS | Performed by: INTERNAL MEDICINE

## 2021-10-20 PROCEDURE — C1753 CATH, INTRAVAS ULTRASOUND: HCPCS | Performed by: INTERNAL MEDICINE

## 2021-10-20 PROCEDURE — 75625 PR  ANGIO AORTOGRAM ABD SERIAL: ICD-10-PCS | Mod: 26,,, | Performed by: INTERNAL MEDICINE

## 2021-10-20 RX ORDER — MORPHINE SULFATE 2 MG/ML
2 INJECTION, SOLUTION INTRAMUSCULAR; INTRAVENOUS ONCE
Status: COMPLETED | OUTPATIENT
Start: 2021-10-20 | End: 2021-10-20

## 2021-10-20 RX ORDER — PROMETHAZINE HYDROCHLORIDE 25 MG/ML
INJECTION, SOLUTION INTRAMUSCULAR; INTRAVENOUS
Status: DISCONTINUED | OUTPATIENT
Start: 2021-10-20 | End: 2021-10-20 | Stop reason: HOSPADM

## 2021-10-20 RX ORDER — MIDAZOLAM HYDROCHLORIDE 1 MG/ML
INJECTION, SOLUTION INTRAMUSCULAR; INTRAVENOUS
Status: DISCONTINUED | OUTPATIENT
Start: 2021-10-20 | End: 2021-10-20 | Stop reason: HOSPADM

## 2021-10-20 RX ORDER — IODIXANOL 320 MG/ML
INJECTION, SOLUTION INTRAVASCULAR
Status: DISCONTINUED | OUTPATIENT
Start: 2021-10-20 | End: 2021-10-20 | Stop reason: HOSPADM

## 2021-10-20 RX ORDER — ONDANSETRON 2 MG/ML
4 INJECTION INTRAMUSCULAR; INTRAVENOUS EVERY 12 HOURS PRN
Status: DISCONTINUED | OUTPATIENT
Start: 2021-10-20 | End: 2021-10-20 | Stop reason: HOSPADM

## 2021-10-20 RX ORDER — FENTANYL CITRATE 50 UG/ML
INJECTION, SOLUTION INTRAMUSCULAR; INTRAVENOUS
Status: DISCONTINUED | OUTPATIENT
Start: 2021-10-20 | End: 2021-10-20 | Stop reason: HOSPADM

## 2021-10-20 RX ORDER — HEPARIN SODIUM 200 [USP'U]/100ML
INJECTION, SOLUTION INTRAVENOUS
Status: DISCONTINUED | OUTPATIENT
Start: 2021-10-20 | End: 2021-10-20 | Stop reason: HOSPADM

## 2021-10-20 RX ORDER — SODIUM CHLORIDE 9 MG/ML
INJECTION, SOLUTION INTRAVENOUS CONTINUOUS
Status: ACTIVE | OUTPATIENT
Start: 2021-10-20 | End: 2021-10-20

## 2021-10-20 RX ORDER — VANCOMYCIN HYDROCHLORIDE 1 G/20ML
INJECTION, POWDER, LYOPHILIZED, FOR SOLUTION INTRAVENOUS
Status: DISCONTINUED | OUTPATIENT
Start: 2021-10-20 | End: 2021-10-20 | Stop reason: HOSPADM

## 2021-10-20 RX ORDER — HEPARIN SODIUM 1000 [USP'U]/ML
INJECTION, SOLUTION INTRAVENOUS; SUBCUTANEOUS
Status: DISCONTINUED | OUTPATIENT
Start: 2021-10-20 | End: 2021-10-20 | Stop reason: HOSPADM

## 2021-10-20 RX ORDER — DIPHENHYDRAMINE HYDROCHLORIDE 50 MG/ML
INJECTION INTRAMUSCULAR; INTRAVENOUS
Status: DISCONTINUED | OUTPATIENT
Start: 2021-10-20 | End: 2021-10-20 | Stop reason: HOSPADM

## 2021-10-20 RX ORDER — LIDOCAINE HYDROCHLORIDE 10 MG/ML
INJECTION INFILTRATION; PERINEURAL
Status: DISCONTINUED | OUTPATIENT
Start: 2021-10-20 | End: 2021-10-20 | Stop reason: HOSPADM

## 2021-10-20 RX ORDER — DIPHENHYDRAMINE HYDROCHLORIDE 50 MG/ML
25 INJECTION INTRAMUSCULAR; INTRAVENOUS ONCE
Status: COMPLETED | OUTPATIENT
Start: 2021-10-20 | End: 2021-10-20

## 2021-10-20 RX ORDER — ACETAMINOPHEN 325 MG/1
650 TABLET ORAL EVERY 4 HOURS PRN
Status: DISCONTINUED | OUTPATIENT
Start: 2021-10-20 | End: 2021-10-20 | Stop reason: HOSPADM

## 2021-10-20 RX ADMIN — SODIUM CHLORIDE 272.1 ML: 0.9 INJECTION, SOLUTION INTRAVENOUS at 11:10

## 2021-10-20 RX ADMIN — MORPHINE SULFATE 2 MG: 2 INJECTION, SOLUTION INTRAMUSCULAR; INTRAVENOUS at 01:10

## 2021-10-20 RX ADMIN — DIPHENHYDRAMINE HYDROCHLORIDE 25 MG: 50 INJECTION, SOLUTION INTRAMUSCULAR; INTRAVENOUS at 11:10

## 2021-10-20 RX ADMIN — SODIUM CHLORIDE: 0.9 INJECTION, SOLUTION INTRAVENOUS at 08:10

## 2021-10-26 LAB
POC ACTIVATED CLOTTING TIME K: 230 SEC (ref 74–137)
POC ACTIVATED CLOTTING TIME K: 279 SEC (ref 74–137)
SAMPLE: ABNORMAL
SAMPLE: ABNORMAL

## 2021-10-29 ENCOUNTER — TELEPHONE (OUTPATIENT)
Dept: CARDIOLOGY | Facility: CLINIC | Age: 51
End: 2021-10-29
Payer: MEDICAID

## 2021-10-30 ENCOUNTER — NURSE TRIAGE (OUTPATIENT)
Dept: ADMINISTRATIVE | Facility: CLINIC | Age: 51
End: 2021-10-30
Payer: MEDICAID

## 2021-10-30 RX ORDER — CLOPIDOGREL BISULFATE 75 MG/1
75 TABLET ORAL DAILY
Qty: 90 TABLET | Refills: 3 | Status: SHIPPED | OUTPATIENT
Start: 2021-10-30 | End: 2022-01-21 | Stop reason: SDUPTHER

## 2021-10-30 RX ORDER — CLOPIDOGREL BISULFATE 75 MG/1
75 TABLET ORAL DAILY
Qty: 90 TABLET | Refills: 3 | Status: SHIPPED | OUTPATIENT
Start: 2021-10-30 | End: 2021-10-30

## 2021-11-02 ENCOUNTER — OFFICE VISIT (OUTPATIENT)
Dept: CARDIOLOGY | Facility: CLINIC | Age: 51
End: 2021-11-02
Payer: MEDICAID

## 2021-11-02 VITALS
BODY MASS INDEX: 27.58 KG/M2 | WEIGHT: 203.63 LBS | HEIGHT: 72 IN | SYSTOLIC BLOOD PRESSURE: 120 MMHG | DIASTOLIC BLOOD PRESSURE: 85 MMHG | OXYGEN SATURATION: 98 % | HEART RATE: 101 BPM

## 2021-11-02 DIAGNOSIS — I70.211 ATHEROSCLEROSIS OF NATIVE ARTERY OF RIGHT LOWER EXTREMITY WITH INTERMITTENT CLAUDICATION: ICD-10-CM

## 2021-11-02 DIAGNOSIS — I73.9 PAD (PERIPHERAL ARTERY DISEASE): Primary | ICD-10-CM

## 2021-11-02 DIAGNOSIS — I70.0 AORTIC OCCLUSION: ICD-10-CM

## 2021-11-02 DIAGNOSIS — T82.868D ARTERIAL STENT THROMBOSIS, SUBSEQUENT ENCOUNTER: ICD-10-CM

## 2021-11-02 DIAGNOSIS — Z87.891 FORMER TOBACCO USE: ICD-10-CM

## 2021-11-02 DIAGNOSIS — N50.819 PAIN IN TESTICLE, UNSPECIFIED LATERALITY: ICD-10-CM

## 2021-11-02 DIAGNOSIS — I70.203 ATHEROSCLEROSIS OF ARTERY OF BOTH LOWER EXTREMITIES: ICD-10-CM

## 2021-11-02 DIAGNOSIS — I74.09 LERICHE SYNDROME: ICD-10-CM

## 2021-11-02 DIAGNOSIS — I70.229 CRITICAL LOWER LIMB ISCHEMIA: ICD-10-CM

## 2021-11-02 PROCEDURE — 99215 PR OFFICE/OUTPT VISIT, EST, LEVL V, 40-54 MIN: ICD-10-PCS | Mod: S$PBB,,, | Performed by: INTERNAL MEDICINE

## 2021-11-02 PROCEDURE — 99999 PR PBB SHADOW E&M-EST. PATIENT-LVL IV: CPT | Mod: PBBFAC,,, | Performed by: INTERNAL MEDICINE

## 2021-11-02 PROCEDURE — 99214 OFFICE O/P EST MOD 30 MIN: CPT | Mod: PBBFAC,PO | Performed by: INTERNAL MEDICINE

## 2021-11-02 PROCEDURE — 99999 PR PBB SHADOW E&M-EST. PATIENT-LVL IV: ICD-10-PCS | Mod: PBBFAC,,, | Performed by: INTERNAL MEDICINE

## 2021-11-02 PROCEDURE — 99215 OFFICE O/P EST HI 40 MIN: CPT | Mod: S$PBB,,, | Performed by: INTERNAL MEDICINE

## 2021-11-02 RX ORDER — ATORVASTATIN CALCIUM 80 MG/1
80 TABLET, FILM COATED ORAL NIGHTLY
Qty: 90 TABLET | Refills: 3 | Status: SHIPPED | OUTPATIENT
Start: 2021-11-02 | End: 2022-05-03 | Stop reason: SDUPTHER

## 2021-11-02 RX ORDER — TRAMADOL HYDROCHLORIDE AND ACETAMINOPHEN 37.5; 325 MG/1; MG/1
1 TABLET, FILM COATED ORAL EVERY 6 HOURS PRN
Qty: 30 TABLET | Refills: 0 | Status: SHIPPED | OUTPATIENT
Start: 2021-11-02 | End: 2021-12-01

## 2021-11-11 ENCOUNTER — HOSPITAL ENCOUNTER (OUTPATIENT)
Dept: RADIOLOGY | Facility: HOSPITAL | Age: 51
Discharge: HOME | End: 2021-11-11
Attending: INTERNAL MEDICINE
Payer: MEDICAID

## 2021-11-11 DIAGNOSIS — I73.9 PAD (PERIPHERAL ARTERY DISEASE): ICD-10-CM

## 2021-11-11 PROCEDURE — 93925 LOWER EXTREMITY STUDY: CPT | Mod: TC

## 2021-11-11 PROCEDURE — 93925 US LOWER EXTREMITY ARTERIES BILATERAL: ICD-10-PCS | Mod: 26,,, | Performed by: RADIOLOGY

## 2021-11-11 PROCEDURE — 93925 LOWER EXTREMITY STUDY: CPT | Mod: 26,,, | Performed by: RADIOLOGY

## 2021-11-23 ENCOUNTER — OFFICE VISIT (OUTPATIENT)
Dept: CARDIOLOGY | Facility: CLINIC | Age: 51
End: 2021-11-23
Payer: MEDICAID

## 2021-11-23 VITALS
SYSTOLIC BLOOD PRESSURE: 130 MMHG | WEIGHT: 200.63 LBS | HEART RATE: 97 BPM | OXYGEN SATURATION: 96 % | HEIGHT: 72 IN | BODY MASS INDEX: 27.17 KG/M2 | DIASTOLIC BLOOD PRESSURE: 70 MMHG

## 2021-11-23 DIAGNOSIS — T82.868D ARTERIAL STENT THROMBOSIS, SUBSEQUENT ENCOUNTER: ICD-10-CM

## 2021-11-23 DIAGNOSIS — I70.0 AORTIC OCCLUSION: ICD-10-CM

## 2021-11-23 DIAGNOSIS — I74.09 LERICHE SYNDROME: ICD-10-CM

## 2021-11-23 DIAGNOSIS — Z76.89 ENCOUNTER TO ESTABLISH CARE: ICD-10-CM

## 2021-11-23 DIAGNOSIS — I73.9 PAD (PERIPHERAL ARTERY DISEASE): Primary | ICD-10-CM

## 2021-11-23 DIAGNOSIS — Z87.891 FORMER TOBACCO USE: ICD-10-CM

## 2021-11-23 DIAGNOSIS — I70.203 ATHEROSCLEROSIS OF ARTERY OF BOTH LOWER EXTREMITIES: ICD-10-CM

## 2021-11-23 DIAGNOSIS — I70.229 CRITICAL LOWER LIMB ISCHEMIA: ICD-10-CM

## 2021-11-23 PROCEDURE — 99214 OFFICE O/P EST MOD 30 MIN: CPT | Mod: S$PBB,,, | Performed by: INTERNAL MEDICINE

## 2021-11-23 PROCEDURE — 99999 PR PBB SHADOW E&M-EST. PATIENT-LVL IV: ICD-10-PCS | Mod: PBBFAC,,, | Performed by: INTERNAL MEDICINE

## 2021-11-23 PROCEDURE — 99214 OFFICE O/P EST MOD 30 MIN: CPT | Mod: PBBFAC,PO | Performed by: INTERNAL MEDICINE

## 2021-11-23 PROCEDURE — 99214 PR OFFICE/OUTPT VISIT, EST, LEVL IV, 30-39 MIN: ICD-10-PCS | Mod: S$PBB,,, | Performed by: INTERNAL MEDICINE

## 2021-11-23 PROCEDURE — 3008F PR BODY MASS INDEX (BMI) DOCUMENTED: ICD-10-PCS | Mod: CPTII,,, | Performed by: INTERNAL MEDICINE

## 2021-11-23 PROCEDURE — 3008F BODY MASS INDEX DOCD: CPT | Mod: CPTII,,, | Performed by: INTERNAL MEDICINE

## 2021-11-23 PROCEDURE — 99999 PR PBB SHADOW E&M-EST. PATIENT-LVL IV: CPT | Mod: PBBFAC,,, | Performed by: INTERNAL MEDICINE

## 2021-12-01 ENCOUNTER — OFFICE VISIT (OUTPATIENT)
Dept: UROLOGY | Facility: CLINIC | Age: 51
End: 2021-12-01
Payer: MEDICAID

## 2021-12-01 VITALS
DIASTOLIC BLOOD PRESSURE: 71 MMHG | BODY MASS INDEX: 26.87 KG/M2 | TEMPERATURE: 98 F | SYSTOLIC BLOOD PRESSURE: 103 MMHG | WEIGHT: 198.38 LBS | HEIGHT: 72 IN | OXYGEN SATURATION: 98 % | HEART RATE: 104 BPM

## 2021-12-01 DIAGNOSIS — N50.812 PAIN IN LEFT TESTICLE: Primary | ICD-10-CM

## 2021-12-01 DIAGNOSIS — B37.2 CANDIDAL INTERTRIGO: ICD-10-CM

## 2021-12-01 LAB
BILIRUB UR QL STRIP: NEGATIVE
CLARITY UR REFRACT.AUTO: CLEAR
COLOR UR AUTO: YELLOW
GLUCOSE UR QL STRIP: NEGATIVE
HGB UR QL STRIP: ABNORMAL
HYALINE CASTS UR QL AUTO: 2 /LPF
KETONES UR QL STRIP: NEGATIVE
LEUKOCYTE ESTERASE UR QL STRIP: NEGATIVE
MICROSCOPIC COMMENT: ABNORMAL
NITRITE UR QL STRIP: NEGATIVE
PH UR STRIP: 5 [PH] (ref 5–8)
PROT UR QL STRIP: NEGATIVE
RBC #/AREA URNS AUTO: 2 /HPF (ref 0–4)
SP GR UR STRIP: 1.03 (ref 1–1.03)
SQUAMOUS #/AREA URNS AUTO: 0 /HPF
URN SPEC COLLECT METH UR: ABNORMAL
WBC #/AREA URNS AUTO: 2 /HPF (ref 0–5)

## 2021-12-01 PROCEDURE — 99999 PR PBB SHADOW E&M-EST. PATIENT-LVL V: ICD-10-PCS | Mod: PBBFAC,,, | Performed by: NURSE PRACTITIONER

## 2021-12-01 PROCEDURE — 81001 URINALYSIS AUTO W/SCOPE: CPT | Performed by: NURSE PRACTITIONER

## 2021-12-01 PROCEDURE — 99215 OFFICE O/P EST HI 40 MIN: CPT | Mod: PBBFAC,PO | Performed by: NURSE PRACTITIONER

## 2021-12-01 PROCEDURE — 99204 OFFICE O/P NEW MOD 45 MIN: CPT | Mod: S$PBB,,, | Performed by: NURSE PRACTITIONER

## 2021-12-01 PROCEDURE — 99204 PR OFFICE/OUTPT VISIT, NEW, LEVL IV, 45-59 MIN: ICD-10-PCS | Mod: S$PBB,,, | Performed by: NURSE PRACTITIONER

## 2021-12-01 PROCEDURE — 99999 PR PBB SHADOW E&M-EST. PATIENT-LVL V: CPT | Mod: PBBFAC,,, | Performed by: NURSE PRACTITIONER

## 2021-12-01 PROCEDURE — 87086 URINE CULTURE/COLONY COUNT: CPT | Performed by: NURSE PRACTITIONER

## 2021-12-01 RX ORDER — DICLOFENAC SODIUM 75 MG/1
75 TABLET, DELAYED RELEASE ORAL 2 TIMES DAILY
Qty: 28 TABLET | Refills: 0 | Status: SHIPPED | OUTPATIENT
Start: 2021-12-01 | End: 2021-12-15

## 2021-12-01 RX ORDER — KETOCONAZOLE 20 MG/G
CREAM TOPICAL DAILY
Qty: 15 G | Refills: 1 | Status: SHIPPED | OUTPATIENT
Start: 2021-12-01 | End: 2022-04-18

## 2021-12-03 LAB — BACTERIA UR CULT: NO GROWTH

## 2021-12-06 ENCOUNTER — PATIENT MESSAGE (OUTPATIENT)
Dept: UROLOGY | Facility: CLINIC | Age: 51
End: 2021-12-06
Payer: MEDICAID

## 2021-12-13 ENCOUNTER — OFFICE VISIT (OUTPATIENT)
Dept: FAMILY MEDICINE | Facility: HOSPITAL | Age: 51
End: 2021-12-13
Attending: INTERNAL MEDICINE
Payer: MEDICAID

## 2021-12-13 ENCOUNTER — HOSPITAL ENCOUNTER (OUTPATIENT)
Dept: RADIOLOGY | Facility: HOSPITAL | Age: 51
Discharge: HOME | End: 2021-12-13
Attending: NURSE PRACTITIONER
Payer: MEDICAID

## 2021-12-13 VITALS
HEIGHT: 72 IN | DIASTOLIC BLOOD PRESSURE: 79 MMHG | HEART RATE: 91 BPM | SYSTOLIC BLOOD PRESSURE: 114 MMHG | WEIGHT: 196.88 LBS | BODY MASS INDEX: 26.67 KG/M2

## 2021-12-13 DIAGNOSIS — Z11.59 ENCOUNTER FOR HEPATITIS C SCREENING TEST FOR LOW RISK PATIENT: ICD-10-CM

## 2021-12-13 DIAGNOSIS — Z76.89 ENCOUNTER TO ESTABLISH CARE: ICD-10-CM

## 2021-12-13 DIAGNOSIS — F32.2 CURRENT SEVERE EPISODE OF MAJOR DEPRESSIVE DISORDER WITHOUT PSYCHOTIC FEATURES, UNSPECIFIED WHETHER RECURRENT: Primary | ICD-10-CM

## 2021-12-13 DIAGNOSIS — Z11.4 ENCOUNTER FOR SCREENING FOR HIV: ICD-10-CM

## 2021-12-13 DIAGNOSIS — N50.812 PAIN IN LEFT TESTICLE: ICD-10-CM

## 2021-12-13 DIAGNOSIS — Z00.00 ROUTINE CHECK-UP: ICD-10-CM

## 2021-12-13 DIAGNOSIS — F41.1 GAD (GENERALIZED ANXIETY DISORDER): ICD-10-CM

## 2021-12-13 PROCEDURE — 99213 OFFICE O/P EST LOW 20 MIN: CPT | Performed by: STUDENT IN AN ORGANIZED HEALTH CARE EDUCATION/TRAINING PROGRAM

## 2021-12-13 PROCEDURE — 76870 US EXAM SCROTUM: CPT | Mod: TC

## 2021-12-13 PROCEDURE — 76870 US EXAM SCROTUM: CPT | Mod: 26,,, | Performed by: RADIOLOGY

## 2021-12-13 PROCEDURE — 76870 US SCROTUM AND TESTICLES: ICD-10-PCS | Mod: 26,,, | Performed by: RADIOLOGY

## 2021-12-13 RX ORDER — ESCITALOPRAM OXALATE 10 MG/1
10 TABLET ORAL DAILY
Qty: 30 TABLET | Refills: 11 | Status: SHIPPED | OUTPATIENT
Start: 2021-12-13 | End: 2022-07-22

## 2021-12-14 ENCOUNTER — TELEPHONE (OUTPATIENT)
Dept: UROLOGY | Facility: CLINIC | Age: 51
End: 2021-12-14
Payer: MEDICAID

## 2021-12-22 ENCOUNTER — TELEPHONE (OUTPATIENT)
Dept: CARDIOLOGY | Facility: CLINIC | Age: 51
End: 2021-12-22
Payer: MEDICAID

## 2021-12-29 ENCOUNTER — OFFICE VISIT (OUTPATIENT)
Dept: CARDIOLOGY | Facility: CLINIC | Age: 51
End: 2021-12-29
Payer: MEDICAID

## 2021-12-29 VITALS
WEIGHT: 198.06 LBS | HEIGHT: 72 IN | DIASTOLIC BLOOD PRESSURE: 86 MMHG | BODY MASS INDEX: 26.83 KG/M2 | HEART RATE: 96 BPM | OXYGEN SATURATION: 97 % | SYSTOLIC BLOOD PRESSURE: 132 MMHG

## 2021-12-29 DIAGNOSIS — T82.868D ARTERIAL STENT THROMBOSIS, SUBSEQUENT ENCOUNTER: ICD-10-CM

## 2021-12-29 DIAGNOSIS — I70.229 CRITICAL LOWER LIMB ISCHEMIA: ICD-10-CM

## 2021-12-29 DIAGNOSIS — Z87.891 FORMER TOBACCO USE: ICD-10-CM

## 2021-12-29 DIAGNOSIS — I70.0 AORTIC OCCLUSION: ICD-10-CM

## 2021-12-29 DIAGNOSIS — I74.09 LERICHE SYNDROME: ICD-10-CM

## 2021-12-29 DIAGNOSIS — I70.203 ATHEROSCLEROSIS OF ARTERY OF BOTH LOWER EXTREMITIES: ICD-10-CM

## 2021-12-29 DIAGNOSIS — I70.211 ATHEROSCLEROSIS OF NATIVE ARTERY OF RIGHT LOWER EXTREMITY WITH INTERMITTENT CLAUDICATION: ICD-10-CM

## 2021-12-29 DIAGNOSIS — I73.9 PAD (PERIPHERAL ARTERY DISEASE): Primary | ICD-10-CM

## 2021-12-29 PROCEDURE — 3075F SYST BP GE 130 - 139MM HG: CPT | Mod: CPTII,,, | Performed by: INTERNAL MEDICINE

## 2021-12-29 PROCEDURE — 3075F PR MOST RECENT SYSTOLIC BLOOD PRESS GE 130-139MM HG: ICD-10-PCS | Mod: CPTII,,, | Performed by: INTERNAL MEDICINE

## 2021-12-29 PROCEDURE — 1160F PR REVIEW ALL MEDS BY PRESCRIBER/CLIN PHARMACIST DOCUMENTED: ICD-10-PCS | Mod: CPTII,,, | Performed by: INTERNAL MEDICINE

## 2021-12-29 PROCEDURE — 99214 OFFICE O/P EST MOD 30 MIN: CPT | Mod: S$PBB,,, | Performed by: INTERNAL MEDICINE

## 2021-12-29 PROCEDURE — 1160F RVW MEDS BY RX/DR IN RCRD: CPT | Mod: CPTII,,, | Performed by: INTERNAL MEDICINE

## 2021-12-29 PROCEDURE — 3008F PR BODY MASS INDEX (BMI) DOCUMENTED: ICD-10-PCS | Mod: CPTII,,, | Performed by: INTERNAL MEDICINE

## 2021-12-29 PROCEDURE — 1159F MED LIST DOCD IN RCRD: CPT | Mod: CPTII,,, | Performed by: INTERNAL MEDICINE

## 2021-12-29 PROCEDURE — 1159F PR MEDICATION LIST DOCUMENTED IN MEDICAL RECORD: ICD-10-PCS | Mod: CPTII,,, | Performed by: INTERNAL MEDICINE

## 2021-12-29 PROCEDURE — 3008F BODY MASS INDEX DOCD: CPT | Mod: CPTII,,, | Performed by: INTERNAL MEDICINE

## 2021-12-29 PROCEDURE — 99213 OFFICE O/P EST LOW 20 MIN: CPT | Mod: PBBFAC,PO | Performed by: INTERNAL MEDICINE

## 2021-12-29 PROCEDURE — 3079F PR MOST RECENT DIASTOLIC BLOOD PRESSURE 80-89 MM HG: ICD-10-PCS | Mod: CPTII,,, | Performed by: INTERNAL MEDICINE

## 2021-12-29 PROCEDURE — 3079F DIAST BP 80-89 MM HG: CPT | Mod: CPTII,,, | Performed by: INTERNAL MEDICINE

## 2021-12-29 PROCEDURE — 3044F HG A1C LEVEL LT 7.0%: CPT | Mod: CPTII,,, | Performed by: INTERNAL MEDICINE

## 2021-12-29 PROCEDURE — 99999 PR PBB SHADOW E&M-EST. PATIENT-LVL III: ICD-10-PCS | Mod: PBBFAC,,, | Performed by: INTERNAL MEDICINE

## 2021-12-29 PROCEDURE — 3044F PR MOST RECENT HEMOGLOBIN A1C LEVEL <7.0%: ICD-10-PCS | Mod: CPTII,,, | Performed by: INTERNAL MEDICINE

## 2021-12-29 PROCEDURE — 99214 PR OFFICE/OUTPT VISIT, EST, LEVL IV, 30-39 MIN: ICD-10-PCS | Mod: S$PBB,,, | Performed by: INTERNAL MEDICINE

## 2021-12-29 PROCEDURE — 99999 PR PBB SHADOW E&M-EST. PATIENT-LVL III: CPT | Mod: PBBFAC,,, | Performed by: INTERNAL MEDICINE

## 2022-01-12 ENCOUNTER — HOSPITAL ENCOUNTER (EMERGENCY)
Facility: HOSPITAL | Age: 52
Discharge: HOME | End: 2022-01-12
Attending: EMERGENCY MEDICINE
Payer: MEDICAID

## 2022-01-12 ENCOUNTER — TELEPHONE (OUTPATIENT)
Dept: CARDIOLOGY | Facility: CLINIC | Age: 52
End: 2022-01-12
Payer: MEDICAID

## 2022-01-12 VITALS
BODY MASS INDEX: 27.12 KG/M2 | TEMPERATURE: 98 F | RESPIRATION RATE: 17 BRPM | HEART RATE: 86 BPM | DIASTOLIC BLOOD PRESSURE: 94 MMHG | OXYGEN SATURATION: 99 % | WEIGHT: 200 LBS | SYSTOLIC BLOOD PRESSURE: 154 MMHG

## 2022-01-12 DIAGNOSIS — R31.9 HEMATURIA, UNSPECIFIED TYPE: Primary | ICD-10-CM

## 2022-01-12 DIAGNOSIS — N20.0 KIDNEY STONE: ICD-10-CM

## 2022-01-12 LAB
ALBUMIN SERPL BCP-MCNC: 3.8 G/DL (ref 3.5–5.2)
ALP SERPL-CCNC: 132 U/L (ref 55–135)
ALT SERPL W/O P-5'-P-CCNC: 21 U/L (ref 10–44)
ANION GAP SERPL CALC-SCNC: 11 MMOL/L (ref 8–16)
AST SERPL-CCNC: 17 U/L (ref 10–40)
BACTERIA #/AREA URNS HPF: ABNORMAL /HPF
BASOPHILS # BLD AUTO: 0.05 K/UL (ref 0–0.2)
BASOPHILS NFR BLD: 0.5 % (ref 0–1.9)
BILIRUB SERPL-MCNC: 0.5 MG/DL (ref 0.1–1)
BILIRUB UR QL STRIP: NEGATIVE
BUN SERPL-MCNC: 7 MG/DL (ref 6–20)
CALCIUM SERPL-MCNC: 9 MG/DL (ref 8.7–10.5)
CHLORIDE SERPL-SCNC: 104 MMOL/L (ref 95–110)
CLARITY UR: CLEAR
CO2 SERPL-SCNC: 22 MMOL/L (ref 23–29)
COLOR UR: COLORLESS
CREAT SERPL-MCNC: 1 MG/DL (ref 0.5–1.4)
DIFFERENTIAL METHOD: NORMAL
EOSINOPHIL # BLD AUTO: 0.2 K/UL (ref 0–0.5)
EOSINOPHIL NFR BLD: 2 % (ref 0–8)
ERYTHROCYTE [DISTWIDTH] IN BLOOD BY AUTOMATED COUNT: 14.1 % (ref 11.5–14.5)
EST. GFR  (AFRICAN AMERICAN): >60 ML/MIN/1.73 M^2
EST. GFR  (NON AFRICAN AMERICAN): >60 ML/MIN/1.73 M^2
GLUCOSE SERPL-MCNC: 113 MG/DL (ref 70–110)
GLUCOSE UR QL STRIP: NEGATIVE
HCT VFR BLD AUTO: 43.7 % (ref 40–54)
HGB BLD-MCNC: 14.8 G/DL (ref 14–18)
HGB UR QL STRIP: ABNORMAL
HYALINE CASTS #/AREA URNS LPF: 0 /LPF
IMM GRANULOCYTES # BLD AUTO: 0.02 K/UL (ref 0–0.04)
IMM GRANULOCYTES NFR BLD AUTO: 0.2 % (ref 0–0.5)
KETONES UR QL STRIP: NEGATIVE
LEUKOCYTE ESTERASE UR QL STRIP: ABNORMAL
LYMPHOCYTES # BLD AUTO: 3.1 K/UL (ref 1–4.8)
LYMPHOCYTES NFR BLD: 31.8 % (ref 18–48)
MCH RBC QN AUTO: 30.1 PG (ref 27–31)
MCHC RBC AUTO-ENTMCNC: 33.9 G/DL (ref 32–36)
MCV RBC AUTO: 89 FL (ref 82–98)
MICROSCOPIC COMMENT: ABNORMAL
MONOCYTES # BLD AUTO: 0.8 K/UL (ref 0.3–1)
MONOCYTES NFR BLD: 8.3 % (ref 4–15)
NEUTROPHILS # BLD AUTO: 5.6 K/UL (ref 1.8–7.7)
NEUTROPHILS NFR BLD: 57.2 % (ref 38–73)
NITRITE UR QL STRIP: NEGATIVE
NRBC BLD-RTO: 0 /100 WBC
PH UR STRIP: 6 [PH] (ref 5–8)
PLATELET # BLD AUTO: 253 K/UL (ref 150–450)
PMV BLD AUTO: 10.6 FL (ref 9.2–12.9)
POTASSIUM SERPL-SCNC: 3.8 MMOL/L (ref 3.5–5.1)
PROT SERPL-MCNC: 6.5 G/DL (ref 6–8.4)
PROT UR QL STRIP: ABNORMAL
RBC # BLD AUTO: 4.91 M/UL (ref 4.6–6.2)
RBC #/AREA URNS HPF: >100 /HPF (ref 0–4)
SODIUM SERPL-SCNC: 137 MMOL/L (ref 136–145)
SP GR UR STRIP: <1.005 (ref 1–1.03)
UNIDENT CRYS URNS QL MICRO: 26
URN SPEC COLLECT METH UR: ABNORMAL
UROBILINOGEN UR STRIP-ACNC: NEGATIVE EU/DL
WBC # BLD AUTO: 9.87 K/UL (ref 3.9–12.7)
WBC #/AREA URNS HPF: 1 /HPF (ref 0–5)

## 2022-01-12 PROCEDURE — 81000 URINALYSIS NONAUTO W/SCOPE: CPT | Performed by: PHYSICIAN ASSISTANT

## 2022-01-12 PROCEDURE — 99284 EMERGENCY DEPT VISIT MOD MDM: CPT | Mod: 25

## 2022-01-12 PROCEDURE — 85025 COMPLETE CBC W/AUTO DIFF WBC: CPT | Performed by: PHYSICIAN ASSISTANT

## 2022-01-12 PROCEDURE — 80053 COMPREHEN METABOLIC PANEL: CPT | Performed by: PHYSICIAN ASSISTANT

## 2022-01-12 RX ORDER — TAMSULOSIN HYDROCHLORIDE 0.4 MG/1
0.4 CAPSULE ORAL DAILY
Qty: 10 CAPSULE | Refills: 0 | OUTPATIENT
Start: 2022-01-12 | End: 2022-02-06

## 2022-01-12 RX ORDER — HYDROCODONE BITARTRATE AND ACETAMINOPHEN 5; 325 MG/1; MG/1
1 TABLET ORAL EVERY 4 HOURS PRN
Qty: 12 TABLET | Refills: 0 | Status: SHIPPED | OUTPATIENT
Start: 2022-01-12 | End: 2022-01-21 | Stop reason: SDUPTHER

## 2022-01-12 RX ORDER — ONDANSETRON 4 MG/1
4 TABLET, ORALLY DISINTEGRATING ORAL EVERY 6 HOURS PRN
Qty: 15 TABLET | Refills: 0 | OUTPATIENT
Start: 2022-01-12 | End: 2022-02-06

## 2022-01-12 NOTE — TELEPHONE ENCOUNTER
"pt states last night he had some bleeding when urinating and pt described "it looked like canal water brown"  This morning he noticed it's been "grape fruit red" and has a weird feeling when urinating    Strongly advised pt to seek medical attention at nearest ER or Urgent Care   Pt states he does not want to come to ER because of the wait time but he will to get evaluated    Advised pt I will let Dr. Terry know so he is aware        JA     "

## 2022-01-12 NOTE — TELEPHONE ENCOUNTER
----- Message from Gillian Monge sent at 1/12/2022 10:16 AM CST -----  Type:  Needs Medical Advice    Who Called: pt  Symptoms (please be specific):  states he was instructed to call if things did not get better for him      Would the patient rather a call back or a response via [facility]?  Please call   Best Call Back Number:  [phone]  Additional Information:

## 2022-01-13 NOTE — ED PROVIDER NOTES
Encounter Date: 1/12/2022       History     Chief Complaint   Patient presents with    Hematuria     Patient states this morning he noticed his urine was a brownish color. Throughout day urine turned to a cranberry color. Denies discomfort. Reports history of kidney stones. Patient stated he was just starting to feel nauseated as he was in triage. Added that he was on blood thinners.      51-year-old man that presents with evaluation of hematuria.  Notes that he had a scant amount of nausea and slight discomfort along the left lower quadrant of the abdomen but has no pain at this time.  He denies any fevers, chills, injuries precipitating this event.  He was concerned because he does take a blood thinner as well as have a stent in his arteries near the latter.  He denies any similar episodes to this in the past however in the past he has had episodes of kidney stones.        Review of patient's allergies indicates:   Allergen Reactions    Pcn [penicillins] Anaphylaxis     Past Medical History:   Diagnosis Date    Anticoagulant long-term use     Encounter for blood transfusion     Kidney stone      Past Surgical History:   Procedure Laterality Date    ANGIOGRAPHY OF LOWER EXTREMITY Bilateral 12/7/2020    Procedure: Angiogram Extremity Unilateral;  Surgeon: Terrance Garcia MD;  Location: [facility] OR [address] [place] [address];  Service: Vascular;  Laterality: Bilateral;  Iliac, PTA, and stenting.  7.4 min   1675.32 mGy  274.28 Gycm2    AORTOGRAPHY WITH SERIALOGRAPHY N/A 11/4/2020    Procedure: AORTOGRAM, WITH SERIALOGRAPHY;  Surgeon: Luis Rebollar III, MD;  Location: [facility] CATH LAB;  Service: Cardiology;  Laterality: N/A;     Family History   Problem Relation Age of Onset    Stroke Mother     Stroke Father     No Known Problems Sister      Social History     Tobacco Use    Smoking status: Former Smoker     Packs/day: 1.00     Types: Cigarettes    Smokeless tobacco: Never Used   Substance Use Topics    Alcohol use: Yes      Comment: occasional    Drug use: No     Review of Systems  Constitutional-no fever  HEENT-no congestion  Eyes-no redness  Respiratory-no shortness of breath  Cardio-no chest pain  GI-no abdominal pain  Endocrine-no cold intolerance  -positive hematuria  MSK-no myalgias  Skin-no rashes  Allergy-no environmental allergy  Neurologic-, no headache  Hematology-no swollen nodes  Behavioral-no confusion  Physical Exam     Initial Vitals [01/12/22 2001]   BP Pulse Resp Temp SpO2   (!) 161/79 96 18 97.8 °F (36.6 °C) 100 %      MAP       --         Physical Exam  Constitutional: Well appearing, no distress.  Eyes: Conjunctivae normal.  ENT       Head: Normocephalic, atraumatic.       Nose: Normal external appearance        Mouth/Throat: no strigulous respirations   Hematological/Lymphatic/Immunilogical: no visible lymphadenopathy   Cardiovascular: Normal rate,   Respiratory: Normal respiratory effort.   Gastrointestinal: non distended   Musculoskeletal: Normal range of motion in all extremities. No obvious deformities or swelling.  Neurologic: Alert, oriented. Normal speech and language. No gross focal neurologic deficits are appreciated.  Skin: Skin is warm, dry. No rash noted.  Psychiatric: Mood and affect are normal.   ED Course   Procedures  Labs Reviewed   COMPREHENSIVE METABOLIC PANEL - Abnormal; Notable for the following components:       Result Value    CO2 22 (*)     Glucose 113 (*)     All other components within normal limits   URINALYSIS, REFLEX TO URINE CULTURE - Abnormal; Notable for the following components:    Color, UA Colorless (*)     Specific Gravity, UA <1.005 (*)     Protein, UA 1+ (*)     Occult Blood UA 3+ (*)     Leukocytes, UA Trace (*)     All other components within normal limits    Narrative:     Specimen Source->Urine   URINALYSIS MICROSCOPIC - Abnormal; Notable for the following components:    RBC, UA >100 (*)     All other components within normal limits    Narrative:     Specimen  Source->Urine   CBC W/ AUTO DIFFERENTIAL          Imaging Results          CT Renal Stone Study ABD Pelvis WO (Final result)  Result time 01/12/22 22:40:46    Final result by Carl Jeong MD (01/12/22 22:40:46)                 Impression:      1. 4 x 7 mm stone in the proximal left ureter with minimal hydronephrosis on the left.  Recommend follow-up.  2. Nonobstructing nephrolithiasis of the right kidney.  3. Small hiatal hernia.  4. Small fat containing umbilical hernia.  5. Distal aortic bi-iliac stent graft with possible focal luminal narrowing at the margins of the stent bilaterally in the common iliac arteries and proximal external iliac arteries.  Limited visualization.      Electronically signed by: Carl Jeong  Date:    01/12/2022  Time:    22:40             Narrative:    EXAMINATION:  CT RENAL STONE STUDY ABD PELVIS WO    CLINICAL HISTORY:  Flank pain, kidney stone suspected;    TECHNIQUE:  Low dose axial images, sagittal and coronal reformations were obtained from the lung bases to the pubic symphysis.  Contrast was not administered.    COMPARISON:  07/31/2020    FINDINGS:  Heart: Normal in size. No pericardial effusion.    Lung Bases: Well aerated, without consolidation or pleural fluid.    Small hiatal hernia.    Liver: Normal in size and attenuation, with no focal hepatic lesions.    Gallbladder: No calcified gallstones.  The gallbladder is mildly contracted.    Bile Ducts: No evidence of dilated ducts.    Pancreas: No mass or peripancreatic fat stranding.    Spleen: Unremarkable.    Adrenals: Unremarkable.    Kidneys/ Ureters: 4 x 7 mm stone in the proximal left ureter with probable minimal hydronephrosis on the left.  No other stones on the left.    There are 4 nonobstructing small stones in the right kidney.    Bladder: No evidence of wall thickening.    Reproductive organs: Unremarkable.    GI Tract/Mesentery: No evidence of bowel obstruction or inflammation.    The appendix is within normal  limits.    Peritoneal Space: No ascites. No free air.    Retroperitoneum: No significant adenopathy.    Abdominal wall: Small fat containing umbilical hernia.    Vasculature: No evidence of aortic aneurysm.    Distal aortic bi-iliac stent graft is noted.  Possible significant focal luminal narrowing at the proximal and distal margins of the stent in the common iliac arteries and proximal external iliac arteries bilaterally.  Slight metallic artifact related to the stents.  Lack of IV contrast may diminish characterization also.    Bones: No acute fracture.    Moderate disc space narrowing at L4-5 with vacuum disc phenomena.  Moderate bilateral foraminal narrowing.                                 Medications - No data to display  Medical Decision Making:   History:   Old Medical Records: I decided to obtain old medical records.  Old Records Summarized: records from clinic visits.  Differential Diagnosis:   Cystitis, hematuria, bladder cancer, kidney stone  Clinical Tests:   Lab Tests: Ordered and Reviewed  Radiological Study: Ordered and Reviewed  ED Management:  51-year-old man with hematuria and no significant other symptoms.  He has had kidney stones in the past is on blood thinners.  I discussed with him the concern for potential neoplasm.  Imaging is consistent with a stone, will treat for the same and encouraged for close outpatient Urology follow-up for consideration of further treatment.                      Clinical Impression:   Final diagnoses:  [R31.9] Hematuria, unspecified type (Primary)  [N20.0] Kidney stone          ED Disposition Condition    Discharge Stable        ED Prescriptions     Medication Sig Dispense Start Date End Date Auth. Provider    HYDROcodone-acetaminophen (NORCO) 5-325 mg per tablet Take 1 tablet by mouth every 4 (four) hours as needed for Pain. 12 tablet 1/12/2022  James Angeles MD    ondansetron (ZOFRAN-ODT) 4 MG TbDL Take 1 tablet (4 mg total) by mouth every 6 (six) hours  as needed. 15 tablet 1/12/2022  James Angeles MD    tamsulosin (FLOMAX) 0.4 mg Cap Take 1 capsule (0.4 mg total) by mouth once daily. 10 capsule 1/12/2022 1/12/2023 James Angeles MD        Follow-up Information     Follow up With Specialties Details Why Contact Info    Maura Méndez MD Urology Schedule an appointment as soon as possible for a visit in 2 days If symptoms worsen, For a follow up visit about today [address]  [address]  [facility] [place] [address]  [phone]             James Angeles MD  01/12/22 4703

## 2022-01-13 NOTE — FIRST PROVIDER EVALUATION
Emergency Department TeleTriage Encounter Note      CHIEF COMPLAINT    Chief Complaint   Patient presents with    Hematuria     Patient states this morning he noticed his urine was a brownish color. Throughout day urine turned to a cranberry color. Denies discomfort. Reports history of kidney stones. Patient stated he was just starting to feel nauseated as he was in triage. Added that he was on blood thinners.        VITAL SIGNS   Initial Vitals [01/12/22 2001]   BP Pulse Resp Temp SpO2   (!) 161/79 96 18 97.8 °F (36.6 °C) 100 %      MAP       --            ALLERGIES    Review of patient's allergies indicates:   Allergen Reactions    Pcn [penicillins] Anaphylaxis       PROVIDER TRIAGE NOTE  50 yo M to the ED with CC of dark urine today. Hx of kidney stones, but he is not having any pain. No other symptoms.       ORDERS  Labs Reviewed   CBC W/ AUTO DIFFERENTIAL   COMPREHENSIVE METABOLIC PANEL   URINALYSIS, REFLEX TO URINE CULTURE       ED Orders (720h ago, onward)    Start Ordered     Status Ordering Provider    01/12/22 2013 01/12/22 2012  Saline lock IV  Once         Ordered NIGEL CARTWRIGHT    01/12/22 2013 01/12/22 2012  CBC Auto Differential  STAT         Ordered NIGEL CARTWRIGHT.    01/12/22 2013 01/12/22 2012  Comprehensive Metabolic Panel  STAT         Ordered NIGEL CARTWRIGHT.    01/12/22 2013 01/12/22 2012  Urinalysis, Reflex to Urine Culture  STAT         Ordered NIGEL CARTWRIGHT            Virtual Visit Note: The provider triage portion of this emergency department evaluation and documentation was performed via [employer], a HIPAA-compliant telemedicine application, in concert with a tele-presenter in the room. A face to face patient evaluation with one of my colleagues will occur once the patient is placed in an emergency department room.      DISCLAIMER: This note was prepared with [employer] voice recognition transcription software. Garbled syntax, mangled pronouns, and other bizarre constructions  may be attributed to that software system.

## 2022-01-13 NOTE — DISCHARGE INSTRUCTIONS
Mr. Nicole,    Thank you for letting me care for you today! It was nice meeting you, and I hope you feel better soon.   If you would like access to your chart and what was done today please utilize the [facility].   Please come back to [facility] for all of your future medical needs.    Our goal in the emergency department is to always give you outstanding care and exceptional service. You may receive a survey by mail or e-mail in the next week regarding your experience in our ED. We would greatly appreciate you completing and returning the survey. Your feedback provides us with a way to recognize our staff who give very good care and it helps us learn how to improve when your experience was below our aspiration of excellence.     Sincerely,    James Angeles MD  Board Certified Emergency Physician

## 2022-01-14 ENCOUNTER — TELEPHONE (OUTPATIENT)
Dept: CARDIOLOGY | Facility: CLINIC | Age: 52
End: 2022-01-14
Payer: MEDICAID

## 2022-01-14 NOTE — TELEPHONE ENCOUNTER
----- Message from Jacqueline Pozo sent at 1/14/2022 11:59 AM CST -----  Contact: [phone] / Self  Type: Requesting to speak with nurse    Who Called: Pt  Regarding: medication advice    Would the patient rather a call back or a response via [facility]? Call back  Best Call Back Number: [phone]   Additional Information: n/a

## 2022-01-21 ENCOUNTER — OFFICE VISIT (OUTPATIENT)
Dept: FAMILY MEDICINE | Facility: HOSPITAL | Age: 52
End: 2022-01-21
Attending: FAMILY MEDICINE
Payer: MEDICAID

## 2022-01-21 VITALS
BODY MASS INDEX: 27.2 KG/M2 | WEIGHT: 200.81 LBS | HEART RATE: 95 BPM | HEIGHT: 72 IN | DIASTOLIC BLOOD PRESSURE: 76 MMHG | SYSTOLIC BLOOD PRESSURE: 120 MMHG

## 2022-01-21 DIAGNOSIS — Z11.4 ENCOUNTER FOR SCREENING FOR HIV: ICD-10-CM

## 2022-01-21 DIAGNOSIS — Z11.59 ENCOUNTER FOR HEPATITIS C SCREENING TEST FOR LOW RISK PATIENT: ICD-10-CM

## 2022-01-21 DIAGNOSIS — I73.9 PAD (PERIPHERAL ARTERY DISEASE): ICD-10-CM

## 2022-01-21 DIAGNOSIS — N20.0 KIDNEY STONE ON LEFT SIDE: Primary | ICD-10-CM

## 2022-01-21 DIAGNOSIS — F32.2 CURRENT SEVERE EPISODE OF MAJOR DEPRESSIVE DISORDER WITHOUT PSYCHOTIC FEATURES, UNSPECIFIED WHETHER RECURRENT: ICD-10-CM

## 2022-01-21 PROCEDURE — 99213 OFFICE O/P EST LOW 20 MIN: CPT | Performed by: STUDENT IN AN ORGANIZED HEALTH CARE EDUCATION/TRAINING PROGRAM

## 2022-01-21 RX ORDER — LIDOCAINE 50 MG/G
1 PATCH TOPICAL DAILY
Qty: 30 PATCH | Refills: 0 | Status: SHIPPED | OUTPATIENT
Start: 2022-01-21 | End: 2022-03-09

## 2022-01-21 RX ORDER — PROMETHAZINE HYDROCHLORIDE 25 MG/1
25 TABLET ORAL EVERY 6 HOURS PRN
COMMUNITY
Start: 2022-01-13 | End: 2022-03-09

## 2022-01-21 RX ORDER — HYDROCODONE BITARTRATE AND ACETAMINOPHEN 5; 325 MG/1; MG/1
1 TABLET ORAL EVERY 6 HOURS PRN
Qty: 12 TABLET | Refills: 0 | OUTPATIENT
Start: 2022-01-21 | End: 2022-02-06

## 2022-01-21 RX ORDER — CLOPIDOGREL BISULFATE 75 MG/1
75 TABLET ORAL DAILY
Qty: 90 TABLET | Refills: 3 | Status: SHIPPED | OUTPATIENT
Start: 2022-01-21 | End: 2022-06-06 | Stop reason: SDUPTHER

## 2022-01-21 NOTE — PROGRESS NOTES
PROGRESS NOTE  [facility]    Subjective:       Patient ID: Tee Nicole is a 51 y.o. male.    Chief Complaint: Nephrolithiasis      Tee Nicole is a 51 y.o. year old male with PMHx of MDD, Anxiety, PAD who presented to clinic today to follow up for his recent ED visit for asymptomatic hematuria. In the ED ct revealed a 4x7mm stone in the left ureter and he was prescribed a short course of norco and flomax to help pass the stone. He has begun to have increasing L flank pain and mild nausea. He has not taken his flomax because he was concerned it would interact with his xarelto because he thought it was a blood thinner. I explained that the flomax would not cause bleeding issue and that he should continue to avoid nsaids for pain due to bleeding risk. He will see urology 2/15. He had no other complaints today other than needing a refill on his plavix. He states that the lexapro has been helpful and his phq-9 and dag-7 are much improved today. Lab from our last visit were cancelled due to scheduling so will repeat them today      Review of Systems   Constitutional: Negative for activity change, appetite change, chills and unexpected weight change.   HENT: Negative for congestion, ear pain, facial swelling, hearing loss and rhinorrhea.    Eyes: Negative for discharge and redness.   Respiratory: Negative for cough and shortness of breath.    Cardiovascular: Negative for chest pain and leg swelling.   Gastrointestinal: Positive for nausea. Negative for abdominal distention and abdominal pain.   Genitourinary: Positive for dysuria, flank pain and hematuria.   Musculoskeletal: Negative for back pain and myalgias.   Skin: Negative.    Neurological: Negative for facial asymmetry, speech difficulty, weakness and headaches.   Psychiatric/Behavioral: Negative for agitation and confusion.       Objective:      Vitals:    01/21/22 0959   BP: 120/76   Pulse: 95     Physical Exam  Vitals and nursing note  reviewed.   Constitutional:       Appearance: Normal appearance.   HENT:      Head: Normocephalic and atraumatic.   Eyes:      Pupils: Pupils are equal, round, and reactive to light.   Cardiovascular:      Rate and Rhythm: Normal rate and regular rhythm.      Heart sounds: Normal heart sounds.   Pulmonary:      Breath sounds: Normal breath sounds.   Abdominal:      General: Abdomen is flat.      Palpations: Abdomen is soft.   Musculoskeletal:         General: No swelling or tenderness.      Cervical back: Normal range of motion.   Skin:     General: Skin is warm.   Neurological:      General: No focal deficit present.      Mental Status: He is alert.   Psychiatric:         Mood and Affect: Mood normal.         Assessment:       1. Kidney stone on left side    2. Encounter for hepatitis C screening test for low risk patient    3. Encounter for screening for HIV    4. Current severe episode of major depressive disorder without psychotic features, unspecified whether recurrent    5. PAD (peripheral artery disease)        Plan:       Kidney stone on left side - has not taken flomax yet, will hoepfully pass stone with flomax and proper hydration  -     HYDROcodone-acetaminophen (NORCO) 5-325 mg per tablet; Take 1 tablet by mouth every 6 (six) hours as needed for Pain.  Dispense: 12 tablet; Refill: 0  -     LIDOcaine (LIDODERM) 5 %; Place 1 patch onto the skin once daily. Remove & Discard patch within 12 hours or as directed by MD  Dispense: 30 patch; Refill: 0    Encounter for hepatitis C screening test for low risk patient  -     Hepatitis C Antibody; Future; Expected date: 01/21/2022    Encounter for screening for HIV  -     HIV 1/2 Ag/Ab (4th Gen); Future; Expected date: 01/21/2022    Current severe episode of major depressive disorder without psychotic features, unspecified whether recurrent - much improved at this time, currently on lexapro  -     TSH; Future; Expected date: 01/21/2022    PAD (peripheral artery  disease)  -     clopidogreL (PLAVIX) 75 mg tablet; Take 1 tablet (75 mg total) by mouth once daily.  Dispense: 90 tablet; Refill: 3      FU 1 year        Luis Portillo MD, MPH  [facility], PGY-1

## 2022-02-03 ENCOUNTER — PATIENT MESSAGE (OUTPATIENT)
Dept: UROLOGY | Facility: CLINIC | Age: 52
End: 2022-02-03
Payer: MEDICAID

## 2022-02-06 ENCOUNTER — HOSPITAL ENCOUNTER (EMERGENCY)
Facility: HOSPITAL | Age: 52
Discharge: HOME | End: 2022-02-06
Attending: EMERGENCY MEDICINE
Payer: MEDICAID

## 2022-02-06 VITALS
TEMPERATURE: 98 F | RESPIRATION RATE: 20 BRPM | OXYGEN SATURATION: 99 % | HEART RATE: 108 BPM | SYSTOLIC BLOOD PRESSURE: 130 MMHG | DIASTOLIC BLOOD PRESSURE: 83 MMHG

## 2022-02-06 DIAGNOSIS — N21.0 BLADDER STONE: ICD-10-CM

## 2022-02-06 DIAGNOSIS — N20.0 KIDNEY STONE: Primary | ICD-10-CM

## 2022-02-06 LAB
ANION GAP SERPL CALC-SCNC: 15 MMOL/L (ref 8–16)
BASOPHILS # BLD AUTO: 0.06 K/UL (ref 0–0.2)
BASOPHILS NFR BLD: 0.5 % (ref 0–1.9)
BILIRUB UR QL STRIP: NEGATIVE
BUN SERPL-MCNC: 11 MG/DL (ref 6–20)
CALCIUM SERPL-MCNC: 8.5 MG/DL (ref 8.7–10.5)
CHLORIDE SERPL-SCNC: 105 MMOL/L (ref 95–110)
CLARITY UR: CLEAR
CO2 SERPL-SCNC: 19 MMOL/L (ref 23–29)
COLOR UR: YELLOW
CREAT SERPL-MCNC: 0.9 MG/DL (ref 0.5–1.4)
DIFFERENTIAL METHOD: ABNORMAL
EOSINOPHIL # BLD AUTO: 0.3 K/UL (ref 0–0.5)
EOSINOPHIL NFR BLD: 2.2 % (ref 0–8)
ERYTHROCYTE [DISTWIDTH] IN BLOOD BY AUTOMATED COUNT: 13.6 % (ref 11.5–14.5)
EST. GFR  (AFRICAN AMERICAN): >60 ML/MIN/1.73 M^2
EST. GFR  (NON AFRICAN AMERICAN): >60 ML/MIN/1.73 M^2
GLUCOSE SERPL-MCNC: 125 MG/DL (ref 70–110)
GLUCOSE UR QL STRIP: NEGATIVE
HCT VFR BLD AUTO: 44.3 % (ref 40–54)
HGB BLD-MCNC: 15 G/DL (ref 14–18)
HGB UR QL STRIP: ABNORMAL
IMM GRANULOCYTES # BLD AUTO: 0.06 K/UL (ref 0–0.04)
IMM GRANULOCYTES NFR BLD AUTO: 0.5 % (ref 0–0.5)
KETONES UR QL STRIP: NEGATIVE
LEUKOCYTE ESTERASE UR QL STRIP: NEGATIVE
LYMPHOCYTES # BLD AUTO: 3.4 K/UL (ref 1–4.8)
LYMPHOCYTES NFR BLD: 29.6 % (ref 18–48)
MCH RBC QN AUTO: 30.4 PG (ref 27–31)
MCHC RBC AUTO-ENTMCNC: 33.9 G/DL (ref 32–36)
MCV RBC AUTO: 90 FL (ref 82–98)
MONOCYTES # BLD AUTO: 0.9 K/UL (ref 0.3–1)
MONOCYTES NFR BLD: 8 % (ref 4–15)
NEUTROPHILS # BLD AUTO: 6.8 K/UL (ref 1.8–7.7)
NEUTROPHILS NFR BLD: 59.2 % (ref 38–73)
NITRITE UR QL STRIP: NEGATIVE
NRBC BLD-RTO: 0 /100 WBC
PH UR STRIP: 6 [PH] (ref 5–8)
PLATELET # BLD AUTO: 264 K/UL (ref 150–450)
PMV BLD AUTO: 10.1 FL (ref 9.2–12.9)
POTASSIUM SERPL-SCNC: 3.8 MMOL/L (ref 3.5–5.1)
PROT UR QL STRIP: NEGATIVE
RBC # BLD AUTO: 4.94 M/UL (ref 4.6–6.2)
SODIUM SERPL-SCNC: 139 MMOL/L (ref 136–145)
SP GR UR STRIP: 1.01 (ref 1–1.03)
URN SPEC COLLECT METH UR: ABNORMAL
UROBILINOGEN UR STRIP-ACNC: NEGATIVE EU/DL
WBC # BLD AUTO: 11.56 K/UL (ref 3.9–12.7)

## 2022-02-06 PROCEDURE — 85025 COMPLETE CBC W/AUTO DIFF WBC: CPT | Performed by: EMERGENCY MEDICINE

## 2022-02-06 PROCEDURE — 96375 TX/PRO/DX INJ NEW DRUG ADDON: CPT

## 2022-02-06 PROCEDURE — 25000003 PHARM REV CODE 250: Performed by: EMERGENCY MEDICINE

## 2022-02-06 PROCEDURE — 63600175 PHARM REV CODE 636 W HCPCS: Performed by: EMERGENCY MEDICINE

## 2022-02-06 PROCEDURE — 81003 URINALYSIS AUTO W/O SCOPE: CPT | Performed by: EMERGENCY MEDICINE

## 2022-02-06 PROCEDURE — 96374 THER/PROPH/DIAG INJ IV PUSH: CPT

## 2022-02-06 PROCEDURE — 80048 BASIC METABOLIC PNL TOTAL CA: CPT | Performed by: EMERGENCY MEDICINE

## 2022-02-06 PROCEDURE — 99284 EMERGENCY DEPT VISIT MOD MDM: CPT | Mod: 25

## 2022-02-06 RX ORDER — ONDANSETRON 4 MG/1
4 TABLET, ORALLY DISINTEGRATING ORAL EVERY 6 HOURS PRN
Qty: 20 TABLET | Refills: 0 | Status: SHIPPED | OUTPATIENT
Start: 2022-02-06 | End: 2022-03-09

## 2022-02-06 RX ORDER — HYDROCODONE BITARTRATE AND ACETAMINOPHEN 5; 325 MG/1; MG/1
1 TABLET ORAL EVERY 6 HOURS PRN
Qty: 12 TABLET | Refills: 0 | Status: SHIPPED | OUTPATIENT
Start: 2022-02-06 | End: 2022-02-06 | Stop reason: SDUPTHER

## 2022-02-06 RX ORDER — HYDROCODONE BITARTRATE AND ACETAMINOPHEN 5; 325 MG/1; MG/1
1 TABLET ORAL
Status: COMPLETED | OUTPATIENT
Start: 2022-02-06 | End: 2022-02-06

## 2022-02-06 RX ORDER — TAMSULOSIN HYDROCHLORIDE 0.4 MG/1
0.4 CAPSULE ORAL
Status: COMPLETED | OUTPATIENT
Start: 2022-02-06 | End: 2022-02-06

## 2022-02-06 RX ORDER — TAMSULOSIN HYDROCHLORIDE 0.4 MG/1
0.4 CAPSULE ORAL DAILY
Qty: 10 CAPSULE | Refills: 0 | Status: SHIPPED | OUTPATIENT
Start: 2022-02-06 | End: 2022-03-09

## 2022-02-06 RX ORDER — KETOROLAC TROMETHAMINE 30 MG/ML
15 INJECTION, SOLUTION INTRAMUSCULAR; INTRAVENOUS
Status: COMPLETED | OUTPATIENT
Start: 2022-02-06 | End: 2022-02-06

## 2022-02-06 RX ORDER — PHENAZOPYRIDINE HYDROCHLORIDE 200 MG/1
200 TABLET, FILM COATED ORAL 3 TIMES DAILY
Qty: 6 TABLET | Refills: 0 | Status: SHIPPED | OUTPATIENT
Start: 2022-02-06 | End: 2022-02-08

## 2022-02-06 RX ORDER — HYDROCODONE BITARTRATE AND ACETAMINOPHEN 5; 325 MG/1; MG/1
1 TABLET ORAL EVERY 6 HOURS PRN
Qty: 12 TABLET | Refills: 0 | Status: SHIPPED | OUTPATIENT
Start: 2022-02-06 | End: 2022-02-09

## 2022-02-06 RX ORDER — ONDANSETRON 2 MG/ML
4 INJECTION INTRAMUSCULAR; INTRAVENOUS
Status: COMPLETED | OUTPATIENT
Start: 2022-02-06 | End: 2022-02-06

## 2022-02-06 RX ADMIN — TAMSULOSIN HYDROCHLORIDE 0.4 MG: 0.4 CAPSULE ORAL at 10:02

## 2022-02-06 RX ADMIN — ONDANSETRON 4 MG: 2 INJECTION INTRAMUSCULAR; INTRAVENOUS at 11:02

## 2022-02-06 RX ADMIN — KETOROLAC TROMETHAMINE 15 MG: 30 INJECTION, SOLUTION INTRAMUSCULAR at 11:02

## 2022-02-06 RX ADMIN — HYDROCODONE BITARTRATE AND ACETAMINOPHEN 1 TABLET: 5; 325 TABLET ORAL at 01:02

## 2022-02-06 NOTE — DISCHARGE INSTRUCTIONS
Additional instructions:  You have been seen today for a kidney stone. Follow up with urology. Take all your medications as prescribed. Return to the emergency department if you have increasing pain, nonstop vomiting, stop producing urine or any other concerns. Be sure to drink plenty of fluids to stay hydrated. Get plenty of rest. Please refer to additional educational material for further instructions.

## 2022-02-06 NOTE — ED PROVIDER NOTES
Chief Complaint: suprapubic pain, difficulty urinating     History of Present Illness:    Tee Nicole 51 y.o. with a  has a past medical history of Anticoagulant long-term use, Encounter for blood transfusion, and Kidney stone. who presents to the emergency department today with a complaint of suprapubic pain and difficulty urinating. Pt has been dealing with a kidney stone for a month now. Today he has pain in the suprapubic region and when he was urinating his stream cut off suddenly and he was having difficulty urinating. He still feels like he needs to urinate. He has had some nausea but no vomiting. No fevers. He took his last flomax tablet yesterday. He's been out of pain meds for some time. He has a appt with urology next week for his follow up appt.     ROS    Constitutional: No fever, no chills.  Eyes: No discharge. No pain.  HENT: No nasal drainage. No ear ache. No sore throat.  Cardiovascular: No chest pain, no palpitations.  Respiratory: No cough, no shortness of breath.  Gastrointestinal: No abdominal pain, no vomiting. No diarrhea.  Genitourinary: No urgency.  Musculoskeletal: No back pain.   Skin: No rashes, no lesions.  Neurological: No headache, no focal weakness.    Otherwise remaining ROS negative     The history is provided by the patient      ALLERGIES REVIEWED  MEDICATIONS REVIEWED  PMH/PSH/SOC/FH REVIEWED :  Tee Nicole  has a past medical history of Anticoagulant long-term use, Encounter for blood transfusion, and Kidney stone. and   has a past surgical history that includes Aortography with serialography (N/A, 11/4/2020) and Angiography of lower extremity (Bilateral, 12/7/2020). with  reports that he has quit smoking. His smoking use included cigarettes. He smoked 1.00 pack per day. He has never used smokeless tobacco. He reports current alcohol use. He reports that he does not use drugs. and a family history includes No Known Problems in his sister; Stroke in his father and  mother.      Nursing/Ancillary staff note reviewed.  VS reviewed         Physical Exam     /83   Pulse 108   Temp 97.8 °F (36.6 °C) (Oral)   Resp 20   SpO2 99%     Physical Exam    General Appearance: The patient is alert, has no immediate need for airway protection and no signs of toxicity. No acute distress. Lying in bed but able to sit up without difficulty.   HEENT: Eyes: Pupils equal and round no pallor or injection. Extra ocular movements intact. No drainage.       Mouth: Mucous membranes are moist. Oropharynx clear.   Neck:Neck is supple non-tender. No lymphadenopathy. No stridor.   Respiratory: There are no retractions, lungs are clear to auscultation. No wheezing, no crackles. Chest wall nontender to palpation.   Cardiovascular: Regular rate and rhythm. No murmurs, rubs or gallops.  Gastrointestinal:  Abdomen is soft and non-tender, no masses, bowel sounds normal. No guarding, no rebound.  No pulsatile mass.   Neurological: Alert and oriented x 4. CN II-XII grossly intact. No focal weakness. Strength intact 5/5 bilaterally in upper and lower extremities.   Skin: Warm and dry, no rashes.  Musculoskeletal: Extremities are non-tender, non-swollen and have full range of motion. Back NTTP along the midline.     DIFFERENTIAL DIAGNOSIS: After history and physical exam a differential diagnosis was considered, but was not limited to, urinary obstruction, UTI, musculoskeletal causes, kidney stone, pyelonephritis, shingles, and intra-abdominal causes such as diverticulitis and appendicitis, aortic dissection, abdominal aortic aneurysm, colitis, PE, pneumonia.           I independently interpreted the lab results and it showed the following: renal funciton normal, WBC normal, UA without signs of infection             Reviewed by myself, read by radiology.     CT Renal Stone Study ABD Pelvis WO   Final Result      Four nonobstructing right renal stones measuring up to 3 mm.  No hydronephrosis.      Interval  passage of previously identified 4 mm stone in the left proximal ureter with no evidence of hydronephrosis or hydroureter.  There is a new 4 mm calcification at the base of the bladder which may represent this previously identified 4 mm stone.      Additional findings as described above.         Electronically signed by: Roger Coleman MD   Date:    02/06/2022   Time:    11:14                  ED Course     Medications   tamsulosin 24 hr capsule 0.4 mg (0.4 mg Oral Given 2/6/22 1049)   ondansetron injection 4 mg (4 mg Intravenous Given 2/6/22 1120)   ketorolac injection 15 mg (15 mg Intravenous Given 2/6/22 1120)   HYDROcodone-acetaminophen 5-325 mg per tablet 1 tablet (1 tablet Oral Given 2/6/22 1351)         ED Course as of 02/06/22 1634   Sun Feb 06, 2022   1336 Pt has urinated. Discussed the results of the CT scan with him.  [JA]   1337 Leukocytes, UA: Negative [JA]   1337 NITRITE UA: Negative [JA]      ED Course User Index  [JA] Jamison Tran MD              Medical Decision Making:   History:   I obtained history from:  The patient  Old Medical Records: I decided to obtain old medical records.   Reviewed and summarized the old medical record and it showed pt was seen in the ED 1/12 for hematuria, found to have a kidney stone. Followed up with his PCP on 1/21, that chart notes a follow up appt with urology 2/15.     Initial management:  Tee Nicole 51 y.o. male who presents to the ED today for suprapubic pain. Known ureteral stone  The patient was seen and examined, see chart. The history and physical exam was obtained, see chart. The nursing notes and vital signs were reviewed, see chart.        Clinical Tests:   I have ordered and independently interpreted Lab Tests: Ordered and Reviewed  Radiological Study: Ordered and Reviewed      ED Management:  Tee Nicole  presents to the emergency Department today with suprapubic pain, his ureteral stone has passed into his bladder. He has no  obstruction seen on imaging. He has been able to urinate here in the ED. Pain improved. I will discharge him home with symptom control and have him follow up with his urologist as scheduled.   The pt is comfortable with this plan and comfortable going home at this time. After taking into careful account the historical factors and physical exam findings of the patient's presentation today, in conjunction with the empirical and objective data obtained on ED workup, no acute emergent medical condition requiring admission has been identified. The patient appears to be low risk for an emergent medical condition and I feel it is safe and appropriate at this time for the patient to be discharged to follow-up as detailed in their discharge instructions for reevaluation and possible continued outpatient workup and management. Regardless, an unremarkable evaluation in the ED does not preclude the development or presence of a serious or life threatening condition. As such, patient was instructed to return immediately for any worsening or change in current symptoms. Precautions for return discussed at length.  Discharge and follow-up instructions discussed with the patient who expressed understanding and willingness to comply with my recommendations.    Voice recognition software utilized in this note.              Impression      The primary encounter diagnosis was Kidney stone. A diagnosis of Bladder stone was also pertinent to this visit.                Discharge Medication List as of 2/6/2022  2:11 PM      START taking these medications    Details   ondansetron (ZOFRAN-ODT) 4 MG TbDL Take 1 tablet (4 mg total) by mouth every 6 (six) hours as needed (nausea or vomiting)., Starting Sun 2/6/2022, Normal      phenazopyridine (PYRIDIUM) 200 MG tablet Take 1 tablet (200 mg total) by mouth 3 (three) times daily. for 2 days, Starting Sun 2/6/2022, Until Tue 2/8/2022, Normal      tamsulosin (FLOMAX) 0.4 mg Cap Take 1 capsule (0.4 mg  total) by mouth once daily., Starting Sun 2/6/2022, Until Mon 2/6/2023, Normal              Follow-up Information     Urology.    Why: Keep your appt with Urology                                        Jamison Tran MD  02/06/22 9084

## 2022-03-09 ENCOUNTER — OFFICE VISIT (OUTPATIENT)
Dept: FAMILY MEDICINE | Facility: HOSPITAL | Age: 52
End: 2022-03-09
Attending: FAMILY MEDICINE
Payer: MEDICAID

## 2022-03-09 VITALS
HEART RATE: 99 BPM | BODY MASS INDEX: 27.41 KG/M2 | HEIGHT: 72 IN | SYSTOLIC BLOOD PRESSURE: 136 MMHG | DIASTOLIC BLOOD PRESSURE: 92 MMHG | WEIGHT: 202.38 LBS

## 2022-03-09 DIAGNOSIS — Z11.4 ENCOUNTER FOR SCREENING FOR HIV: ICD-10-CM

## 2022-03-09 DIAGNOSIS — Z11.59 ENCOUNTER FOR HEPATITIS C SCREENING TEST FOR LOW RISK PATIENT: ICD-10-CM

## 2022-03-09 DIAGNOSIS — G62.9 PERIPHERAL POLYNEUROPATHY: ICD-10-CM

## 2022-03-09 DIAGNOSIS — F32.2 CURRENT SEVERE EPISODE OF MAJOR DEPRESSIVE DISORDER WITHOUT PSYCHOTIC FEATURES, UNSPECIFIED WHETHER RECURRENT: ICD-10-CM

## 2022-03-09 DIAGNOSIS — M79.605 PAIN OF LEFT LOWER EXTREMITY: Primary | ICD-10-CM

## 2022-03-09 PROCEDURE — 99213 OFFICE O/P EST LOW 20 MIN: CPT | Performed by: STUDENT IN AN ORGANIZED HEALTH CARE EDUCATION/TRAINING PROGRAM

## 2022-03-09 RX ORDER — DICLOFENAC SODIUM 10 MG/G
2 GEL TOPICAL 4 TIMES DAILY PRN
Qty: 100 G | Refills: 1 | Status: SHIPPED | OUTPATIENT
Start: 2022-03-09 | End: 2022-04-18

## 2022-03-09 NOTE — PROGRESS NOTES
PROGRESS NOTE  [facility]    Subjective:       Patient ID: Tee Nicole is a 51 y.o. male.    Chief Complaint: Foot Injury (Right numbness)      Tee Nicole is a 51 y.o. year old male with PMHx of MDD, PAD who presented to clinic today for had right foot numbness and left groin pain and tingling and nocturnal bilateral leg pain.  He states that the numbness in his right foot and middle of his lips started in December of 2020 after his 1st set procedure with Cardiology for bilateral lower extremity claudication he then states that the left inguinal numbness and pain started in October of 2021 after his 2nd procedure with Cardiology for his claudication.  He he was worked up by both Cardiology and Neurology for DVT and for hernias in his left inguinal canal which were both negative.  He has tried NSAIDs and topical lidocaine patches for his groin pain without any help.  He describes his stool nocturnal leg pain as pressure and swelling and happens overnight.  The numbness does not cause significant distress however the the left inguinal numbness and pain does cause him concerned as he feels as though something will burst when straining.  He previously had been immobile between the months of October and December due to postop pain but then was able to roof an entire house once he was mobile.  His numbness in his right foot and left foot is intermittent and he has no identifiable triggers.  Of note he has also been significantly depressed and states he has not felt himself since his 1st procedure back in December 2020.      Review of Systems   Constitutional: Negative for activity change, appetite change, chills and unexpected weight change.   HENT: Negative for congestion, ear pain, facial swelling, hearing loss and rhinorrhea.    Eyes: Negative for discharge and redness.   Respiratory: Negative for cough and shortness of breath.    Cardiovascular: Negative for chest pain and leg swelling.    Gastrointestinal: Negative for abdominal distention and abdominal pain.   Genitourinary: Negative for dysuria and flank pain.   Musculoskeletal: Negative for back pain.   Skin: Negative.    Neurological: Positive for numbness. Negative for facial asymmetry, speech difficulty, weakness and headaches.   Psychiatric/Behavioral: Negative for agitation and confusion.       Objective:      Vitals:    03/09/22 1419   BP: (!) 136/92   Pulse: 99     Physical Exam  Vitals and nursing note reviewed.   Constitutional:       Appearance: Normal appearance.   HENT:      Head: Normocephalic and atraumatic.   Eyes:      Pupils: Pupils are equal, round, and reactive to light.   Cardiovascular:      Rate and Rhythm: Normal rate and regular rhythm.      Heart sounds: Normal heart sounds.   Pulmonary:      Breath sounds: Normal breath sounds.   Abdominal:      General: Abdomen is flat.      Palpations: Abdomen is soft.      Hernia: There is no hernia in the left inguinal area or right inguinal area.   Musculoskeletal:         General: No swelling or tenderness.      Cervical back: Normal range of motion.   Skin:     General: Skin is warm.   Neurological:      General: No focal deficit present.      Mental Status: He is alert.      Comments: Full sensation to monofilament test in both feet.  Full sensation to vibratory sense in all extremities.   Psychiatric:         Mood and Affect: Mood normal.         Assessment:       1. Pain of left lower extremity    2. Peripheral polyneuropathy    3. Encounter for screening for HIV    4. Encounter for hepatitis C screening test for low risk patient    5. Current severe episode of major depressive disorder without psychotic features, unspecified whether recurrent        Plan:       Pain and numbness is of unclear etiology will treat conservatively with pharyngeal also check B12 and folate given history of alcohol abuse.  Also suggested that pain could be musculoskeletal in etiology given his long  period of inability and suggested light exercise and stretches.  Also recommended that patient follow-up with Cardiology sooner than his scheduled 6 months given his symptoms.  Pain of left lower extremity  -     diclofenac sodium (VOLTAREN) 1 % Gel; Apply 2 g topically 4 (four) times daily as needed (pain).  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, PGY-1

## 2022-03-17 ENCOUNTER — TELEPHONE (OUTPATIENT)
Dept: CARDIOLOGY | Facility: CLINIC | Age: 52
End: 2022-03-17
Payer: MEDICAID

## 2022-03-17 NOTE — TELEPHONE ENCOUNTER
----- Message from Luzma Carcamo sent at 3/16/2022  5:55 PM CDT -----  Type:  Needs Medical Advice    Who Called: pt  Symptoms (please be specific): 6 month follow up     Would the patient rather a call back or a response via [employer]? call  Best Call Back Number: [phone]  Additional Information:

## 2022-04-05 ENCOUNTER — OFFICE VISIT (OUTPATIENT)
Dept: CARDIOLOGY | Facility: CLINIC | Age: 52
End: 2022-04-05
Payer: MEDICAID

## 2022-04-05 VITALS
WEIGHT: 196 LBS | BODY MASS INDEX: 26.55 KG/M2 | SYSTOLIC BLOOD PRESSURE: 133 MMHG | OXYGEN SATURATION: 97 % | HEART RATE: 104 BPM | HEIGHT: 72 IN | DIASTOLIC BLOOD PRESSURE: 88 MMHG

## 2022-04-05 DIAGNOSIS — I70.211 ATHEROSCLEROSIS OF NATIVE ARTERY OF RIGHT LOWER EXTREMITY WITH INTERMITTENT CLAUDICATION: ICD-10-CM

## 2022-04-05 DIAGNOSIS — I70.0 AORTIC OCCLUSION: ICD-10-CM

## 2022-04-05 DIAGNOSIS — T82.868D ARTERIAL STENT THROMBOSIS, SUBSEQUENT ENCOUNTER: ICD-10-CM

## 2022-04-05 DIAGNOSIS — I70.229 CRITICAL LOWER LIMB ISCHEMIA: ICD-10-CM

## 2022-04-05 DIAGNOSIS — I70.203 ATHEROSCLEROSIS OF ARTERY OF BOTH LOWER EXTREMITIES: ICD-10-CM

## 2022-04-05 DIAGNOSIS — Z87.891 FORMER TOBACCO USE: ICD-10-CM

## 2022-04-05 DIAGNOSIS — I73.9 PAD (PERIPHERAL ARTERY DISEASE): Primary | ICD-10-CM

## 2022-04-05 DIAGNOSIS — I74.09 LERICHE SYNDROME: ICD-10-CM

## 2022-04-05 PROCEDURE — 3075F SYST BP GE 130 - 139MM HG: CPT | Mod: CPTII,,, | Performed by: INTERNAL MEDICINE

## 2022-04-05 PROCEDURE — 1160F PR REVIEW ALL MEDS BY PRESCRIBER/CLIN PHARMACIST DOCUMENTED: ICD-10-PCS | Mod: CPTII,,, | Performed by: INTERNAL MEDICINE

## 2022-04-05 PROCEDURE — 1159F MED LIST DOCD IN RCRD: CPT | Mod: CPTII,,, | Performed by: INTERNAL MEDICINE

## 2022-04-05 PROCEDURE — 99999 PR PBB SHADOW E&M-EST. PATIENT-LVL III: CPT | Mod: PBBFAC,,, | Performed by: INTERNAL MEDICINE

## 2022-04-05 PROCEDURE — 3008F BODY MASS INDEX DOCD: CPT | Mod: CPTII,,, | Performed by: INTERNAL MEDICINE

## 2022-04-05 PROCEDURE — 1159F PR MEDICATION LIST DOCUMENTED IN MEDICAL RECORD: ICD-10-PCS | Mod: CPTII,,, | Performed by: INTERNAL MEDICINE

## 2022-04-05 PROCEDURE — 3079F DIAST BP 80-89 MM HG: CPT | Mod: CPTII,,, | Performed by: INTERNAL MEDICINE

## 2022-04-05 PROCEDURE — 1160F RVW MEDS BY RX/DR IN RCRD: CPT | Mod: CPTII,,, | Performed by: INTERNAL MEDICINE

## 2022-04-05 PROCEDURE — 3079F PR MOST RECENT DIASTOLIC BLOOD PRESSURE 80-89 MM HG: ICD-10-PCS | Mod: CPTII,,, | Performed by: INTERNAL MEDICINE

## 2022-04-05 PROCEDURE — 99213 OFFICE O/P EST LOW 20 MIN: CPT | Mod: PBBFAC,PO | Performed by: INTERNAL MEDICINE

## 2022-04-05 PROCEDURE — 99214 OFFICE O/P EST MOD 30 MIN: CPT | Mod: S$PBB,,, | Performed by: INTERNAL MEDICINE

## 2022-04-05 PROCEDURE — 99214 PR OFFICE/OUTPT VISIT, EST, LEVL IV, 30-39 MIN: ICD-10-PCS | Mod: S$PBB,,, | Performed by: INTERNAL MEDICINE

## 2022-04-05 PROCEDURE — 99999 PR PBB SHADOW E&M-EST. PATIENT-LVL III: ICD-10-PCS | Mod: PBBFAC,,, | Performed by: INTERNAL MEDICINE

## 2022-04-05 PROCEDURE — 3008F PR BODY MASS INDEX (BMI) DOCUMENTED: ICD-10-PCS | Mod: CPTII,,, | Performed by: INTERNAL MEDICINE

## 2022-04-05 PROCEDURE — 3075F PR MOST RECENT SYSTOLIC BLOOD PRESS GE 130-139MM HG: ICD-10-PCS | Mod: CPTII,,, | Performed by: INTERNAL MEDICINE

## 2022-04-05 RX ORDER — SODIUM CHLORIDE 9 MG/ML
INJECTION, SOLUTION INTRAVENOUS CONTINUOUS
Status: CANCELLED | OUTPATIENT
Start: 2022-04-05 | End: 2022-04-05

## 2022-04-05 RX ORDER — DIPHENHYDRAMINE HCL 25 MG
50 CAPSULE ORAL ONCE
Status: CANCELLED | OUTPATIENT
Start: 2022-04-05 | End: 2022-04-05

## 2022-04-05 NOTE — PROGRESS NOTES
"Subjective:    Patient ID:  Tee Nicole is a 51 y.o. male who presents for follow-up of Peripheral Arterial Disease      HPI    52 y/o male who presents for hospital f/u. He has a hx of PAD with aorto-iliac occlusion (Leriche's) s/p bilateral iliac stent placement, previous smoker (quit 2020) who presented initially to hospital for worsening bilateral claudication. Had previous claudication improved after peripheral intervention. Had developed again claudication of both lower extremities which started at hips and continued down legs with frequent, intermittent rest pain. Non non healing ulcers. Compliant with meds. On review of Epic, initially seen by Dr Rebollar for claudication and possible CLI of left foot 5th toe, had peripheral angiogram with distal aortic occlusion (thrombus) with bilateral iliac artery occlusion, referred to Vascular Surgery, had bilateral iliac artery intervention (Bilateral iliac kissing stents - bilateral 6 mm x 59 mm [place] VBX, then bilateral 8 mm x 60 mm Bard LifeStents placed more proximally on 12/07/2020 with Dr. Garcia), started on DOAC, DAPT, stopped ASA, unclear when DOAC was stopped, developed bilateral worsening claudication with intermittent rest pain, presented to ED and was admitted. Had peripheral angiogram with bilateral iliac artery occlusions, aspiration thrombectomy, bilateral PTA, bilateral BMS's placed distal to covered stents, confirmed with IVUS, and discharged home on Plavix/DOAC/statin. Had initially done well since discharge with no significant claudication last visit. Had been walking and attempting to exercise.   Since then developed acute RLE "numbness" from hip to foot with some numbness of left foot. After a few days presented to ED and had CTA with:  Aorto bi-iliac stent graft with occlusion of the right common iliac stent and reconstitution at the level of the right external iliac artery.  Left common iliac stent graft is narrowed but " patent.     Occlusion or severe stenosis of the right proximal peroneal and posterior tibial arteries, noting a 2 vessel runoff to the foot similar to 07/25/2021.     Three-vessel runoff to the left lower extremity with relatively diminished flow in the left peroneal artery when compared with 07/25/2021.  In the ED, RLE numbness resolved along with left foot issues. States that it did not return, however, had right calf cramping with walking <500 steps which had resolved post intervention.  Repeat peripheral angiogram with occluded LCIA and s/p bilateral kissing balloons with excellent results. Post intervention was complaining of groin pain at access site and would not allow staff to hold pressure. Hemodynamically stable and discharged home. Post discharge developed left testicular pain. States at some point had discoloration (ecchymosis?), now resolved. Pain had worsened and was tender to touch and provoked with coughing/sneezing. Had recent arterial doppler with:  Abnormal monophasic flow throughout the right lower extremity.  Findings potentially relating to vessel hardening related to atherosclerosis versus more central upstream stenosis.  Triphasic waveforms demonstrated on the left.     Reversed flow within the right posterior tibial artery, similar.     No abnormal velocity doubling to suggest hemodynamically significant stenosis.  Previous visit was complaining of left testicular pain and was seen by Urology with normal US and labs. Pain has significantly improved and feels it only when bending forward or sneezing and is mild. Leg symptoms/claudication had improved and he was able to walk from parking area to clinic previously with no claudication. Has continued to try staying more active.   Has been having progressive claudication which has affected his lifestyle. Had been having severe, Luca IIb claudication, which had been affecting his work. Tolerating meds well and compliant. Denies CP, SOB/JUAREZ,  orthopnea, PND, syncope, palps, LE edema. No non healing ulceration or signs of limb ischemia.    Review of Systems   Constitutional: Negative for malaise/fatigue.   HENT: Negative for congestion.    Eyes: Negative for blurred vision.   Cardiovascular: Positive for claudication. Negative for chest pain, cyanosis, dyspnea on exertion, irregular heartbeat, leg swelling, near-syncope, orthopnea, palpitations, paroxysmal nocturnal dyspnea and syncope.   Respiratory: Negative for shortness of breath.    Endocrine: Negative for polyuria.   Hematologic/Lymphatic: Negative for bleeding problem.   Skin: Negative for itching and rash.   Musculoskeletal: Negative for joint swelling, muscle cramps and muscle weakness.   Gastrointestinal: Negative for abdominal pain, hematemesis, hematochezia, melena, nausea and vomiting.   Genitourinary: Negative for dysuria and hematuria.   Neurological: Positive for weakness. Negative for dizziness, focal weakness, headaches, light-headedness and loss of balance.   Psychiatric/Behavioral: Negative for depression. The patient is not nervous/anxious.         Objective:    Physical Exam  Constitutional:       Appearance: He is well-developed.   HENT:      Head: Normocephalic and atraumatic.   Neck:      Vascular: No JVD.   Cardiovascular:      Rate and Rhythm: Normal rate and regular rhythm.      Pulses:           Carotid pulses are 2+ on the right side and 2+ on the left side.       Radial pulses are 2+ on the right side and 2+ on the left side.        Femoral pulses are 2+ on the right side and 2+ on the left side.     Heart sounds: Normal heart sounds.      Comments: Monophasic RPT/RDP  Biphasic LPT/LDP  Pulmonary:      Effort: Pulmonary effort is normal.      Breath sounds: Normal breath sounds.   Abdominal:      General: Bowel sounds are normal.      Palpations: Abdomen is soft.   Musculoskeletal:      Cervical back: Neck supple.   Skin:     General: Skin is warm and dry.   Neurological:       Mental Status: He is alert and oriented to person, place, and time.   Psychiatric:         Behavior: Behavior normal.         Thought Content: Thought content normal.           Assessment:       1. PAD (peripheral artery disease)    2. Atherosclerosis of native artery of right lower extremity with intermittent claudication    3. Atherosclerosis of artery of both lower extremities    4. Critical lower limb ischemia    5. Leriche syndrome    6. Aortic occlusion    7. Arterial stent thrombosis, subsequent encounter    8. Former tobacco use      52 y/o pt with hx and presentation as above. Doing well from a cardiac perspective and compensated from a HF perspective. Regarding PAD, has severe, lifestyle limiting Luca IIb claudication. Will plan for peripheral angiogram with possible intervention. Discussed plan with pt and he is in agreement. Cont Plavix/DOAC/statin. Walking program, med compliance. Discussed the etiology, evaluation, and management of PAD, CLI, PTA, tobacco abuse. Discussed the importance of med compliance, heart healthy diet, and regular exercise.     Plan:       -Peripheral angiogram + intervention  -RRA access with 4/5 slender, pigtail, PV cath  -Will likely use bilateral CFA access for iliac interventions  -Continue current medical management  -Consents at time of cath  -f/u in 1 month

## 2022-04-05 NOTE — H&P (VIEW-ONLY)
"Subjective:    Patient ID:  Tee Nicole is a 51 y.o. male who presents for follow-up of Peripheral Arterial Disease      HPI    52 y/o male who presents for hospital f/u. He has a hx of PAD with aorto-iliac occlusion (Leriche's) s/p bilateral iliac stent placement, previous smoker (quit 2020) who presented initially to hospital for worsening bilateral claudication. Had previous claudication improved after peripheral intervention. Had developed again claudication of both lower extremities which started at hips and continued down legs with frequent, intermittent rest pain. Non non healing ulcers. Compliant with meds. On review of Epic, initially seen by Dr Rebollar for claudication and possible CLI of left foot 5th toe, had peripheral angiogram with distal aortic occlusion (thrombus) with bilateral iliac artery occlusion, referred to Vascular Surgery, had bilateral iliac artery intervention (Bilateral iliac kissing stents - bilateral 6 mm x 59 mm [place] VBX, then bilateral 8 mm x 60 mm [employer] LifeStents placed more proximally on 12/07/2020 with Dr. Garcia), started on DOAC, DAPT, stopped ASA, unclear when DOAC was stopped, developed bilateral worsening claudication with intermittent rest pain, presented to ED and was admitted. Had peripheral angiogram with bilateral iliac artery occlusions, aspiration thrombectomy, bilateral PTA, bilateral BMS's placed distal to covered stents, confirmed with IVUS, and discharged home on Plavix/DOAC/statin. Had initially done well since discharge with no significant claudication last visit. Had been walking and attempting to exercise.   Since then developed acute RLE "numbness" from hip to foot with some numbness of left foot. After a few days presented to ED and had CTA with:  Aorto bi-iliac stent graft with occlusion of the right common iliac stent and reconstitution at the level of the right external iliac artery.  Left common iliac stent graft is narrowed but " patent.     Occlusion or severe stenosis of the right proximal peroneal and posterior tibial arteries, noting a 2 vessel runoff to the foot similar to 07/25/2021.     Three-vessel runoff to the left lower extremity with relatively diminished flow in the left peroneal artery when compared with 07/25/2021.  In the ED, RLE numbness resolved along with left foot issues. States that it did not return, however, had right calf cramping with walking <500 steps which had resolved post intervention.  Repeat peripheral angiogram with occluded LCIA and s/p bilateral kissing balloons with excellent results. Post intervention was complaining of groin pain at access site and would not allow staff to hold pressure. Hemodynamically stable and discharged home. Post discharge developed left testicular pain. States at some point had discoloration (ecchymosis?), now resolved. Pain had worsened and was tender to touch and provoked with coughing/sneezing. Had recent arterial doppler with:  Abnormal monophasic flow throughout the right lower extremity.  Findings potentially relating to vessel hardening related to atherosclerosis versus more central upstream stenosis.  Triphasic waveforms demonstrated on the left.     Reversed flow within the right posterior tibial artery, similar.     No abnormal velocity doubling to suggest hemodynamically significant stenosis.  Previous visit was complaining of left testicular pain and was seen by Urology with normal US and labs. Pain has significantly improved and feels it only when bending forward or sneezing and is mild. Leg symptoms/claudication had improved and he was able to walk from parking area to clinic previously with no claudication. Has continued to try staying more active.   Has been having progressive claudication which has affected his lifestyle. Had been having severe, Luca IIb claudication, which had been affecting his work. Tolerating meds well and compliant. Denies CP, SOB/JUAREZ,  orthopnea, PND, syncope, palps, LE edema. No non healing ulceration or signs of limb ischemia.    Review of Systems   Constitutional: Negative for malaise/fatigue.   HENT: Negative for congestion.    Eyes: Negative for blurred vision.   Cardiovascular: Positive for claudication. Negative for chest pain, cyanosis, dyspnea on exertion, irregular heartbeat, leg swelling, near-syncope, orthopnea, palpitations, paroxysmal nocturnal dyspnea and syncope.   Respiratory: Negative for shortness of breath.    Endocrine: Negative for polyuria.   Hematologic/Lymphatic: Negative for bleeding problem.   Skin: Negative for itching and rash.   Musculoskeletal: Negative for joint swelling, muscle cramps and muscle weakness.   Gastrointestinal: Negative for abdominal pain, hematemesis, hematochezia, melena, nausea and vomiting.   Genitourinary: Negative for dysuria and hematuria.   Neurological: Positive for weakness. Negative for dizziness, focal weakness, headaches, light-headedness and loss of balance.   Psychiatric/Behavioral: Negative for depression. The patient is not nervous/anxious.         Objective:    Physical Exam  Constitutional:       Appearance: He is well-developed.   HENT:      Head: Normocephalic and atraumatic.   Neck:      Vascular: No JVD.   Cardiovascular:      Rate and Rhythm: Normal rate and regular rhythm.      Pulses:           Carotid pulses are 2+ on the right side and 2+ on the left side.       Radial pulses are 2+ on the right side and 2+ on the left side.        Femoral pulses are 2+ on the right side and 2+ on the left side.     Heart sounds: Normal heart sounds.      Comments: Monophasic RPT/RDP  Biphasic LPT/LDP  Pulmonary:      Effort: Pulmonary effort is normal.      Breath sounds: Normal breath sounds.   Abdominal:      General: Bowel sounds are normal.      Palpations: Abdomen is soft.   Musculoskeletal:      Cervical back: Neck supple.   Skin:     General: Skin is warm and dry.   Neurological:       Mental Status: He is alert and oriented to person, place, and time.   Psychiatric:         Behavior: Behavior normal.         Thought Content: Thought content normal.           Assessment:       1. PAD (peripheral artery disease)    2. Atherosclerosis of native artery of right lower extremity with intermittent claudication    3. Atherosclerosis of artery of both lower extremities    4. Critical lower limb ischemia    5. Leriche syndrome    6. Aortic occlusion    7. Arterial stent thrombosis, subsequent encounter    8. Former tobacco use      50 y/o pt with hx and presentation as above. Doing well from a cardiac perspective and compensated from a HF perspective. Regarding PAD, has severe, lifestyle limiting Luca IIb claudication. Will plan for peripheral angiogram with possible intervention. Discussed plan with pt and he is in agreement. Cont Plavix/DOAC/statin. Walking program, med compliance. Discussed the etiology, evaluation, and management of PAD, CLI, PTA, tobacco abuse. Discussed the importance of med compliance, heart healthy diet, and regular exercise.     Plan:       -Peripheral angiogram + intervention  -RRA access with 4/5 slender, pigtail, PV cath  -Will likely use bilateral CFA access for iliac interventions  -Continue current medical management  -Consents at time of cath  -f/u in 1 month

## 2022-04-11 ENCOUNTER — TELEPHONE (OUTPATIENT)
Dept: FAMILY MEDICINE | Facility: HOSPITAL | Age: 52
End: 2022-04-11
Payer: MEDICAID

## 2022-04-12 NOTE — TELEPHONE ENCOUNTER
----- Message from Catarina Suggs sent at 4/11/2022  1:41 PM CDT -----  Patient called to cancel his appointment and wants to let know  Dr. Portillo  he is having a surgery on one artery around his waist line,

## 2022-04-18 ENCOUNTER — HOSPITAL ENCOUNTER (OUTPATIENT)
Facility: HOSPITAL | Age: 52
Discharge: HOME | End: 2022-04-18
Attending: INTERNAL MEDICINE | Admitting: INTERNAL MEDICINE
Payer: MEDICAID

## 2022-04-18 VITALS
HEIGHT: 72 IN | DIASTOLIC BLOOD PRESSURE: 83 MMHG | WEIGHT: 200 LBS | TEMPERATURE: 98 F | HEART RATE: 63 BPM | SYSTOLIC BLOOD PRESSURE: 148 MMHG | BODY MASS INDEX: 27.09 KG/M2 | RESPIRATION RATE: 17 BRPM | OXYGEN SATURATION: 97 %

## 2022-04-18 DIAGNOSIS — Z01.810 PRE-OPERATIVE CARDIOVASCULAR EXAMINATION: Primary | ICD-10-CM

## 2022-04-18 DIAGNOSIS — I70.229 CRITICAL LOWER LIMB ISCHEMIA: ICD-10-CM

## 2022-04-18 DIAGNOSIS — I74.09 LERICHE SYNDROME: ICD-10-CM

## 2022-04-18 DIAGNOSIS — I73.9 PAD (PERIPHERAL ARTERY DISEASE): ICD-10-CM

## 2022-04-18 LAB
ANION GAP SERPL CALC-SCNC: 11 MMOL/L (ref 8–16)
ANION GAP SERPL CALC-SCNC: 7 MMOL/L (ref 8–16)
BASOPHILS # BLD AUTO: 0.07 K/UL (ref 0–0.2)
BASOPHILS NFR BLD: 0.7 % (ref 0–1.9)
BUN SERPL-MCNC: 12 MG/DL (ref 6–20)
BUN SERPL-MCNC: 13 MG/DL (ref 6–20)
CALCIUM SERPL-MCNC: 8.3 MG/DL (ref 8.7–10.5)
CALCIUM SERPL-MCNC: 8.9 MG/DL (ref 8.7–10.5)
CHLORIDE SERPL-SCNC: 107 MMOL/L (ref 95–110)
CHLORIDE SERPL-SCNC: 108 MMOL/L (ref 95–110)
CO2 SERPL-SCNC: 22 MMOL/L (ref 23–29)
CO2 SERPL-SCNC: 23 MMOL/L (ref 23–29)
CREAT SERPL-MCNC: 0.8 MG/DL (ref 0.5–1.4)
CREAT SERPL-MCNC: 1 MG/DL (ref 0.5–1.4)
CTP QC/QA: YES
DIFFERENTIAL METHOD: NORMAL
EOSINOPHIL # BLD AUTO: 0.4 K/UL (ref 0–0.5)
EOSINOPHIL NFR BLD: 3.9 % (ref 0–8)
ERYTHROCYTE [DISTWIDTH] IN BLOOD BY AUTOMATED COUNT: 13.9 % (ref 11.5–14.5)
EST. GFR  (AFRICAN AMERICAN): >60 ML/MIN/1.73 M^2
EST. GFR  (AFRICAN AMERICAN): >60 ML/MIN/1.73 M^2
EST. GFR  (NON AFRICAN AMERICAN): >60 ML/MIN/1.73 M^2
EST. GFR  (NON AFRICAN AMERICAN): >60 ML/MIN/1.73 M^2
GLUCOSE SERPL-MCNC: 113 MG/DL (ref 70–110)
GLUCOSE SERPL-MCNC: 97 MG/DL (ref 70–110)
HCT VFR BLD AUTO: 48 % (ref 40–54)
HGB BLD-MCNC: 15.9 G/DL (ref 14–18)
IMM GRANULOCYTES # BLD AUTO: 0.03 K/UL (ref 0–0.04)
IMM GRANULOCYTES NFR BLD AUTO: 0.3 % (ref 0–0.5)
LYMPHOCYTES # BLD AUTO: 2.9 K/UL (ref 1–4.8)
LYMPHOCYTES NFR BLD: 27.4 % (ref 18–48)
MCH RBC QN AUTO: 30.1 PG (ref 27–31)
MCHC RBC AUTO-ENTMCNC: 33.1 G/DL (ref 32–36)
MCV RBC AUTO: 91 FL (ref 82–98)
MONOCYTES # BLD AUTO: 0.8 K/UL (ref 0.3–1)
MONOCYTES NFR BLD: 7.6 % (ref 4–15)
NEUTROPHILS # BLD AUTO: 6.4 K/UL (ref 1.8–7.7)
NEUTROPHILS NFR BLD: 60.1 % (ref 38–73)
NRBC BLD-RTO: 0 /100 WBC
PLATELET # BLD AUTO: 271 K/UL (ref 150–450)
PMV BLD AUTO: 10.2 FL (ref 9.2–12.9)
POC ACTIVATED CLOTTING TIME K: 225 SEC (ref 74–137)
POC ACTIVATED CLOTTING TIME K: 261 SEC (ref 74–137)
POTASSIUM SERPL-SCNC: 4.2 MMOL/L (ref 3.5–5.1)
POTASSIUM SERPL-SCNC: 4.4 MMOL/L (ref 3.5–5.1)
RBC # BLD AUTO: 5.29 M/UL (ref 4.6–6.2)
SAMPLE: ABNORMAL
SAMPLE: ABNORMAL
SARS-COV-2 AG RESP QL IA.RAPID: NEGATIVE
SODIUM SERPL-SCNC: 137 MMOL/L (ref 136–145)
SODIUM SERPL-SCNC: 141 MMOL/L (ref 136–145)
WBC # BLD AUTO: 10.59 K/UL (ref 3.9–12.7)

## 2022-04-18 PROCEDURE — 27201423 OPTIME MED/SURG SUP & DEVICES STERILE SUPPLY: Performed by: INTERNAL MEDICINE

## 2022-04-18 PROCEDURE — C1887 CATHETER, GUIDING: HCPCS | Performed by: INTERNAL MEDICINE

## 2022-04-18 PROCEDURE — 75625 CONTRAST EXAM ABDOMINL AORTA: CPT | Mod: 26,,, | Performed by: INTERNAL MEDICINE

## 2022-04-18 PROCEDURE — C1760 CLOSURE DEV, VASC: HCPCS | Performed by: INTERNAL MEDICINE

## 2022-04-18 PROCEDURE — 85347 COAGULATION TIME ACTIVATED: CPT | Performed by: INTERNAL MEDICINE

## 2022-04-18 PROCEDURE — 36415 COLL VENOUS BLD VENIPUNCTURE: CPT | Performed by: INTERNAL MEDICINE

## 2022-04-18 PROCEDURE — 99153 MOD SED SAME PHYS/QHP EA: CPT | Performed by: INTERNAL MEDICINE

## 2022-04-18 PROCEDURE — 37220 PR REVASCULARIZE ILIAC ARTERY,ANGIOPLASTY, INITIAL VESSEL: ICD-10-PCS | Mod: RT,,, | Performed by: INTERNAL MEDICINE

## 2022-04-18 PROCEDURE — 80048 BASIC METABOLIC PNL TOTAL CA: CPT | Performed by: INTERNAL MEDICINE

## 2022-04-18 PROCEDURE — 37220 HC ILIAC REVASC: CPT | Mod: RT | Performed by: INTERNAL MEDICINE

## 2022-04-18 PROCEDURE — C1725 CATH, TRANSLUMIN NON-LASER: HCPCS | Performed by: INTERNAL MEDICINE

## 2022-04-18 PROCEDURE — C1894 INTRO/SHEATH, NON-LASER: HCPCS | Performed by: INTERNAL MEDICINE

## 2022-04-18 PROCEDURE — 85025 COMPLETE CBC W/AUTO DIFF WBC: CPT | Performed by: INTERNAL MEDICINE

## 2022-04-18 PROCEDURE — 80048 BASIC METABOLIC PNL TOTAL CA: CPT | Mod: 91 | Performed by: INTERNAL MEDICINE

## 2022-04-18 PROCEDURE — 63600175 PHARM REV CODE 636 W HCPCS: Performed by: INTERNAL MEDICINE

## 2022-04-18 PROCEDURE — C1769 GUIDE WIRE: HCPCS | Performed by: INTERNAL MEDICINE

## 2022-04-18 PROCEDURE — 75625 PR  ANGIO AORTOGRAM ABD SERIAL: ICD-10-PCS | Mod: 26,,, | Performed by: INTERNAL MEDICINE

## 2022-04-18 PROCEDURE — 93010 EKG 12-LEAD: ICD-10-PCS | Mod: ,,, | Performed by: INTERNAL MEDICINE

## 2022-04-18 PROCEDURE — 99152 MOD SED SAME PHYS/QHP 5/>YRS: CPT | Performed by: INTERNAL MEDICINE

## 2022-04-18 PROCEDURE — C1753 CATH, INTRAVAS ULTRASOUND: HCPCS | Performed by: INTERNAL MEDICINE

## 2022-04-18 PROCEDURE — 75716 PR  ANGIO EXTERMITY BILAT: ICD-10-PCS | Mod: 26,59,, | Performed by: INTERNAL MEDICINE

## 2022-04-18 PROCEDURE — 75625 CONTRAST EXAM ABDOMINL AORTA: CPT | Performed by: INTERNAL MEDICINE

## 2022-04-18 PROCEDURE — 93010 ELECTROCARDIOGRAM REPORT: CPT | Mod: ,,, | Performed by: INTERNAL MEDICINE

## 2022-04-18 PROCEDURE — 75716 ARTERY X-RAYS ARMS/LEGS: CPT | Mod: 59 | Performed by: INTERNAL MEDICINE

## 2022-04-18 PROCEDURE — 93005 ELECTROCARDIOGRAM TRACING: CPT

## 2022-04-18 PROCEDURE — 25500020 PHARM REV CODE 255: Performed by: INTERNAL MEDICINE

## 2022-04-18 PROCEDURE — 75716 ARTERY X-RAYS ARMS/LEGS: CPT | Mod: 26,59,, | Performed by: INTERNAL MEDICINE

## 2022-04-18 PROCEDURE — 25000003 PHARM REV CODE 250: Performed by: INTERNAL MEDICINE

## 2022-04-18 PROCEDURE — 37220 PR REVASCULARIZE ILIAC ARTERY,ANGIOPLASTY, INITIAL VESSEL: CPT | Mod: RT,,, | Performed by: INTERNAL MEDICINE

## 2022-04-18 RX ORDER — IODIXANOL 320 MG/ML
INJECTION, SOLUTION INTRAVASCULAR
Status: DISCONTINUED | OUTPATIENT
Start: 2022-04-18 | End: 2022-04-18 | Stop reason: HOSPADM

## 2022-04-18 RX ORDER — FENTANYL CITRATE 50 UG/ML
INJECTION, SOLUTION INTRAMUSCULAR; INTRAVENOUS
Status: DISCONTINUED | OUTPATIENT
Start: 2022-04-18 | End: 2022-04-18 | Stop reason: HOSPADM

## 2022-04-18 RX ORDER — HEPARIN SODIUM 1000 [USP'U]/ML
INJECTION, SOLUTION INTRAVENOUS; SUBCUTANEOUS
Status: DISCONTINUED | OUTPATIENT
Start: 2022-04-18 | End: 2022-04-18 | Stop reason: HOSPADM

## 2022-04-18 RX ORDER — MIDAZOLAM HYDROCHLORIDE 1 MG/ML
INJECTION INTRAMUSCULAR; INTRAVENOUS
Status: DISCONTINUED | OUTPATIENT
Start: 2022-04-18 | End: 2022-04-18 | Stop reason: HOSPADM

## 2022-04-18 RX ORDER — ACETAMINOPHEN 325 MG/1
650 TABLET ORAL EVERY 4 HOURS PRN
Status: DISCONTINUED | OUTPATIENT
Start: 2022-04-18 | End: 2022-04-18 | Stop reason: HOSPADM

## 2022-04-18 RX ORDER — ONDANSETRON 4 MG/1
8 TABLET, ORALLY DISINTEGRATING ORAL EVERY 8 HOURS PRN
Status: DISCONTINUED | OUTPATIENT
Start: 2022-04-18 | End: 2022-04-18 | Stop reason: HOSPADM

## 2022-04-18 RX ORDER — DIPHENHYDRAMINE HYDROCHLORIDE 50 MG/ML
INJECTION INTRAMUSCULAR; INTRAVENOUS
Status: DISCONTINUED | OUTPATIENT
Start: 2022-04-18 | End: 2022-04-18 | Stop reason: HOSPADM

## 2022-04-18 RX ORDER — LIDOCAINE HYDROCHLORIDE 20 MG/ML
INJECTION, SOLUTION EPIDURAL; INFILTRATION; INTRACAUDAL; PERINEURAL
Status: DISCONTINUED | OUTPATIENT
Start: 2022-04-18 | End: 2022-04-18 | Stop reason: HOSPADM

## 2022-04-18 RX ORDER — SODIUM CHLORIDE 9 MG/ML
INJECTION, SOLUTION INTRAVENOUS CONTINUOUS
Status: ACTIVE | OUTPATIENT
Start: 2022-04-18 | End: 2022-04-18

## 2022-04-18 RX ORDER — VERAPAMIL HYDROCHLORIDE 2.5 MG/ML
INJECTION, SOLUTION INTRAVENOUS
Status: DISCONTINUED | OUTPATIENT
Start: 2022-04-18 | End: 2022-04-18 | Stop reason: HOSPADM

## 2022-04-18 RX ORDER — HEPARIN SODIUM 200 [USP'U]/100ML
INJECTION, SOLUTION INTRAVENOUS
Status: DISCONTINUED | OUTPATIENT
Start: 2022-04-18 | End: 2022-04-18 | Stop reason: HOSPADM

## 2022-04-18 RX ORDER — VANCOMYCIN HYDROCHLORIDE 1 G/20ML
INJECTION, POWDER, LYOPHILIZED, FOR SOLUTION INTRAVENOUS
Status: DISCONTINUED | OUTPATIENT
Start: 2022-04-18 | End: 2022-04-18 | Stop reason: HOSPADM

## 2022-04-18 NOTE — Clinical Note
dry, intact, no bleeding and no hematoma. Access sites to the right and left groin and to the right wrist.

## 2022-04-18 NOTE — PLAN OF CARE
Patient transfered to cath lab recovery [place] 3 via stretcher with side rails up x2. Pt drowsy but able to follow commands. Pt is stable when connecting to cardiac monitors. VSS. NADN.    Left and Right groin sites with gauze and tegaderm in place are CDI. No redness, bruising, or hematoma noted around site.     R radial site with vascband in place with 12cc of air. No redness, bruising, or hematoma noted. Dopplered harinder pedal pulses. +2 harinder radial pulses. Skin is normal in color, warm to touch, < 3 sec cap refill.    Fall risk precautions given and patient acknowledges. AIDET completed to pt. This RN to update pt's son and sister via phone. Will continue to monitor patient for safety and needs.

## 2022-04-18 NOTE — PLAN OF CARE
"Pt drank a few sips of orange juice. Reports he is "overall feeling great" and has decreased anxiety from this AM. Sites are CDI. Continuing to monitor and remove air from radial band.   "

## 2022-04-18 NOTE — HOSPITAL COURSE
Hospital Course:   Patient presented for outpatient peripheral angiogram which went without complication. Peripheral angiogram revealed severe B/L common iliac stenosis.   S/P B/L Iliac PTA with 7.0 x80 kissing balloons, IVUS. Pigtail with B/L LE post intervention runoff. See full cath report in Epic for details. Hemostasis of patient's R CFA and L CFA, R Radial access sites were achieved with Perclosure and radial band. Patient was monitored per post-cath protocol, and his R and L groin and R radial access sites were c/d/i with no hematoma. Patient was able to ambulate without difficulty. He was feeling well and anticipating discharge home today.     Outpatient Plan:  - There were no medication changes  - Continue Xarelto and Plavix

## 2022-04-18 NOTE — PLAN OF CARE
Remaining air removed from R radial vasc band. Gauze and tegaderm dressing applied. Site is CDI. No bleeding or hematoma noted around site. Site and surrounding areas soft. +2 harinder radial pulses palpated. Skin normal in color, warm to touch, < 3 sec cap refill.     Pt stood at side of bed to use urinal, no difficulty. Will continue to monitor pt.

## 2022-04-18 NOTE — PLAN OF CARE
Pt arrived with family from home, ambulates without assistance. Patient lying in bed with no complaints of pain or distress noted. Monitors applied. VSS, AAOx4. NADN. Yellow non-skid socks placed on patient. Fall risk reviewed with patient. EKG is pending. Labs drawn, pending results.    MD consent pending, placed in chart at bedside. Procedure and recovery process explained to patient and all questions answered. Patient verbalized understanding, denies questions. Will continue to monitor for safety and needs.

## 2022-04-18 NOTE — PLAN OF CARE
Patient discharged to home as ordered after 3 hour recovery per Dr. Terry. Pt is AAOx4, VSS, denies any pain. Dr. Terry at bedside to discuss case. All discharge instructions and printed materials reviewed and given to patient. Patient instructed on follow-up appointment as ordered. Patient verbalized understanding and agreement with all discharge instructions given.     R radial gauze and tegaderm dressing c.d.i. No bleeding or hematoma noted around site.   L groin gauze and tegaderm dressing c.d.i. No bleeding or hematoma noted around site.  R groin gauze and tegaderm dressing c.d.i. No bleeding or hematoma noted around site.    Sites and surrounding areas are soft. Palpated +2 harinder radial pulses. Dopplered harinder pedal pulses.     Pt voided without difficulty ~600mL clear yellow urine. Pt denies n/v. Pt got dressed and moving around without difficulty and no distress noted. Pt in w/c. Left wrist 20 gauge iv dc'd as ordered with tip intact. Gauze and koban dressing applied c.d.i.  Pt discharged home via wheelchair to private vehicle by pt's son.

## 2022-04-18 NOTE — Clinical Note
An angiography of the  bilateral lower extremity was performed with the catheter and power injected with 70 mL contrast at at 7 mL/s.

## 2022-04-18 NOTE — Clinical Note
220 ml of contrast were injected throughout the case. 80 mL of contrast was the total wasted during the case. 300 mL was the total amount used during the case.

## 2022-04-18 NOTE — DISCHARGE INSTRUCTIONS
Discharge Instructions:    Do not drive a car, operate heavy equipment, care for a young child, etc for the next 12-24 hours.   Avoid drinking alcohol for 24 hours.  Do not make any important decisions for 24 hours.  Drink fluids to keep hydrated. Resume your usual diet as tolerated.   Rest for today then activity as tolerated.   Do not lift anything over 5 pounds for the first 3 days after procedure.    Remove dressings x3 tomorrow Tuesday 4/19/2022 3:30PM then may shower with warm soapy water. Do not scrub site. Pat dry.   May apply bandaid for 2 days. No tub baths.  Do not submerge wound in water for 3 days.     Call MD for any unrelieved pain, excessive nausea or vomiting, redness around site, bleeding, or pus or foul smelling drainage, or any other questions or concerns. [phone]. CALL 911 and Go to the ER for any difficulty breathing or chest pain.    If site swells or bleeds, hold direct pressure to area for 10 full minutes and have someone bring you to the ER.      Follow any additional instructions given to you by MD. Call MD to schedule a follow up appointment, or follow up as instructed.    Understanding Transradial Cardiac Catheterization      Cardiac catheterization (cardiac cath) is a common, non-surgical procedure. During the procedure, your doctor will insert a long, thin tube (catheter) into an artery and move it up into your heart. Transradial means the catheter is inserted into an artery in the wrist (the radial artery). This procedure can be used to diagnose and treat certain heart problems.  Why do I need a transradial cardiac cath?  You may need a cardiac cath if signs indicate a problem with your heart. These may include:  Symptoms of chest pain, tightness, or heaviness (known as angina). This is a common symptom of blocked heart arteries, known as coronary artery disease.  Symptoms of weakness, dizziness, trouble breathing, or swollen legs or feet. These may be symptoms of a problem with  a heart valve or the heart muscle.  Other test results show heart problems. Tests may include stress tests, heart scans, and echocardiography.  During a cardiac cath, your doctor can see the condition of the coronary arteries and heart valves. He or she can also check how well the heart pumps and the flow of blood through the heart. Your doctor can also measure pressures and take blood samples. And, if needed, he or she can open blocked arteries. This can help reduce symptoms of angina.  Cardiac cath is often done using a catheter inserted into an artery in the groin. During transradial cardiac cath, the catheter is inserted into an artery in the wrist. This can mean less bleeding and a faster recovery. Some people may have blockages in the groin arteries as well as in the heart arteries, making it difficult to reach the heart. The transradial approach can be used to get around this problem.   What happens during a transradial cardiac cath?  The procedure is done in the hospital or a surgery center. First, an IV line is put in your arm or hand to deliver fluids and medicines. You will likely be given medicine to relax you and make you drowsy. When the procedure begins:  You lie on an X-ray table.  The skin over the insertion site in your wrist is numbed.  The doctor makes a tiny puncture or incision into the artery in the wrist. He or she then inserts a catheter and threads it through the blood vessel into your heart.    The doctor may inject a contrast fluid through the catheter into the arteries. This fluid makes the arteries show up better on X-rays.  Tests may be done to check the condition of your heart and arteries. If needed, the doctor can clear blockages in the arteries or do other repairs.  When the doctor is finished, he or she will remove the catheter and put direct pressure on the site to prevent bleeding.  You will stay for a time to recover, and then go home.  What are the risks of transradial cardiac  cath?  These include:  Bleeding, bruising, infection, or blood clots  Damage to the radial artery that may cause injury to the hand  Allergic reaction to the contrast fluid  Abnormal heartbeat (arrhythmia)  Damage to blood vessels or tissues  Kidney damage or failure  The need for emergency heart surgery  Heart attack, stroke, or death  Date Last Reviewed: 5/1/2016 © 2000-2017 [employer]. [address] [place] [address]. All rights reserved. This information is not intended as a substitute for professional medical care. Always follow your healthcare professional's instructions.     Peripheral Angioplasty  Peripheral angioplasty is a procedure that helps open blockages in peripheral arteries. These vessels carry blood to your lower body, legs, and arms.  Talk with your healthcare provider about the risks and complications of angioplasty.   Before the procedure  Recommendations of what to do include:   Tell your healthcare provider about all medicines you take including over-the-counter medicines, herbal supplements, and any allergies you may have, especially to iodine.   Follow any directions youre given for not eating or drinking before the procedure.  Arrange for a family member or friend to drive you home.  During the procedure    You may get medicine through an IV (intravenous) line to relax you. An injection will numb the site your healthcare provider will use to perform the procedure. The site used is generally an artery in the groin although the wrist or arm may be used. The healthcare provider makes a tiny skin cut (incision) near an artery in your groin.  Your healthcare provider puts a thin, flexible tube (catheter) through the incision. He or she then threads the catheter into the affected artery while using X-ray monitoring.  Contrast dye is injected into the catheter. X-rays (angiography) are taken.  A tiny balloon is pushed through the catheter to the blockage. Your  healthcare provider inflates and deflates the balloon a few times. This compresses the plaque. A small metal or mesh tube (stent) may be put in the artery to help keep it open. The balloon and catheter are then taken out.  After the procedure  Youll be taken to a recovery area. Pressure is put on the insertion site for about 30 to 45 minutes. Your healthcare provider will tell you how long to lie down and keep the insertion site still. You will go home that day or spend the night in the facility. You will be given aftercare instructions for when you go home.   Call your healthcare provider  Call your healthcare provider right away or get immediate medical attention if:  You notice a lump or bleeding at the site where the catheter was inserted  You feel increasing pain at the insertion site  You become lightheaded or dizzy  You have leg pain or numbness  You have a leg that turns blue or feels cold  You develop a fever greater than 101.5°F (38.6°C).  You develop a skin rash  You cannot urinate after the procedure  You have chest pain or shortness of breath   Date Last Reviewed: 5/1/2016  © 4726-5459 [employer]. [address] [place] [address]. All rights reserved. This information is not intended as a substitute for professional medical care. Always follow your healthcare professional's instructions.       Bleeding or Hematoma After Cardiac Catheterization  You recently had cardiac catheterization. A catheter was put into your body through a puncture site in your groin or arm. You now have bleeding from this site. When bleeding occurs, it may drip or spurt from the site. Or it may collect in a lump (hematoma) under the skin. In the emergency department, pressure will be put on the site to stop bleeding. You will be sent home when the bleeding stops. To prevent repeat bleeding, take some precautions at home. If the bleeding starts again, follow the advice below.      Home care  For the next  "48 hours:  Don't do any strenuous activity.  Don't climb stairs.  Don't lift anything heavy.  If the puncture site is in your arm or wrist, don't lie on that arm.  If your puncture site is in the groin, don't strain at bowel movements.  Don't scrub the site when bathing. It is fine to get it wet after your healthcare provider says it is OK, but don't scrub it or massage it.  If bleeding happens again, call 911. Take the following steps to stop the bleeding until help arrives:  Lie on your back.  Place a clean cloth or gauze pad over the puncture site. Then hold firm pressure right on the site. Or have someone else apply firm pressure using the gauze pad or washcloth.  Keep your arm straight and raised above the level of your heart if the site is in your arm or wrist. If the site is in your groin, have someone else hold pressure on the site. If you dont have someone to do this, put a 5-pound bag of rice or flour on the site and apply pressure with your hand over the bag.  Don't press too hard! If you press too hard, your leg and foot (or arm and hand) will not get blood flow and the skin under your toenails or fingernails may turn white. The skin should look pink, like the toenails on your other foot. When you (or someone) presses on the toenail it will turn white, but when you stop pressing on the nail it will turn pink again. This is called "capillary refill." If there is someone with you, he or she can check it.  If your toes, foot, or leg start feeling numb, tingly, or cold, ease up on the pressure, because you are probably pressing too hard.  As soon as emergency care arrives, they will take over your care.  Follow-up care  Follow up with your healthcare provider, or as advised. Ask your healthcare provider for a contact number to call.  Call 911  Call 911 if any of these occur:  Chest pain or pressure  Any bleeding from the site  Feeling weak or faint  Trouble breathing  Lump (hematoma) is quickly getting " larger  Coolness, numbness, tingling, or skin color changes in the leg or arm with the puncture site  When to seek medical advice  Call your healthcare provider right away if any of these occur:  Increased pain, redness, swelling, or drainage from the puncture site  Nausea or vomiting  Date Last Reviewed: 1/11/2016 © 2000-2017 [employer]. [address] [place] [address]. All rights reserved. This information is not intended as a substitute for professional medical care. Always follow your healthcare professional's instructions.       Discharge Instructions for Cardiac Catheterization  Cardiac catheterization is a procedure to look for blocked areas in the blood vessels that send blood to the heart. A thin, flexible tube (catheter) is put in a blood vessel in your groin or arm. The healthcare provider injects contrast fluid into your blood, which then flows to your heart. X-rays pictures are taken of your heart. Your provider will review the results with you. Be sure to ask any questions you have before you leave. This sheet will help you take care of yourself at home.  Home care  Only do light and easy activities for the next 2 to 3 days. Ask for help with chores and errands while you recover. Have someone drive you to your appointments.  Don't lift anything heavy for a while. Your healthcare team will tell you when it's safe to lift again.  Ask your healthcare team when you can expect to return to work. Unless your job involves lifting, you may be able to return to your normal activities within a couple of days.  Take your medicines as directed. Don't skip doses.  Drink 6 to 8 glasses of water a day. This is to help flush the contrast dye out of your body. Call your healthcare team if your urine has any change in color.  Take your temperature each day for 7 days. If you feel cold and clammy or start sweating, take your temperature right away and call your healthcare team.  Check your  incisions every day for signs of infection. These include redness, swelling, and drainage. It is normal to have a small bruise or bump where the catheter was inserted. A bruise that is getting larger is not normal and should be reported to your healthcare team. If you see blood forming in the incision, call your healthcare team. Go to the emergency department if you have uncontrolled bleeding from the artery site. This is especially true if you take medicines that make it difficult for your blood to clot. Examples are aspirin, clopidogrel, and warfarin.  Eat a healthy diet. Make sure it is low in fat, salt, and cholesterol. Ask your healthcare team for diet information.  Stop smoking. Enroll in a stop-smoking program or ask your healthcare team for help. Stop-smoking programs can be life saving.  Exercise as your healthcare team tells you to. Your healthcare team may recommend you start a cardiac rehabilitation program. Cardiac rehab is an exercise program in which trained healthcare staff watch your progress and stress on your heart while you exercise. Ask your team how to enroll.  Don't swim or take baths until your healthcare team says its OK. You can shower the day after the procedure. Keep the site clean and dry. This keeps the incision from getting wet and infected until the skin and artery can heal.  Follow-up care  Make a follow-up appointment as advised by our staff. It's common to have a follow-up appointment 2 to 4 weeks after an angioplasty or coronary stent procedure.  Make a yearly appointment, too. This is to make sure you are still doing well and not having any new symptoms.  Don't wait for a follow-up appointment if your medicines aren't working or you are having heart-related symptoms.  When to seek medical care  Call your healthcare provider right away if you have any of the following:  Chest pain  Constant or increasing pain or numbness in your leg  Fever of 100.4°F (38.0°C) or higher, or as  directed by your healthcare provider  Symptoms of infection. These include redness, swelling, drainage, or warmth at the incision site.  Shortness of breath  A leg that feels cold or appears blue  Bleeding, bruising, or a lot of swelling where the catheter was inserted  Blood in your urine  Black or tarry stools  Any unusual bleeding   Date Last Reviewed: 10/1/2016  © 5622-4498 [employer]. [address] [place] [address]. All rights reserved. This information is not intended as a substitute for professional medical care. Always follow your healthcare professional's instructions.

## 2022-04-18 NOTE — DISCHARGE SUMMARY
[facility] ([facility])  Cardiology  Discharge Summary      Patient Name: Tee Nicole  MRN: [id number]  Admission Date: 4/18/2022  Hospital Length of Stay: 0 days  Discharge Date and Time:  04/18/2022 5:00 PM  Attending Physician: Raymond Terry MD    Discharging Provider: Luis Lucia MD  Primary Care Physician: Luis Portillo MD    HPI:   No notes on file    Procedure(s) (LRB):  PTA, PERIPHERAL VESSEL (N/A)  Angiogram, Abdominal Aorta W/ Extremity Runoff  PTA, Iliac Artery  ULTRASOUND, INTRAVASCULAR (N/A)     Indwelling Lines/Drains at time of discharge:  Lines/Drains/Airways     None                 Hospital Course:  Hospital Course:   Patient presented for outpatient peripheral angiogram which went without complication. Peripheral angiogram revealed severe B/L common iliac stenosis.   S/P B/L Iliac PTA with 7.0 x80 kissing balloons, IVUS. Pigtail with B/L LE post intervention runoff. See full cath report in Epic for details. Hemostasis of patient's R CFA and L CFA, R Radial access sites were achieved with Perclosure and radial band. Patient was monitored per post-cath protocol, and his R and L groin and R radial access sites were c/d/i with no hematoma. Patient was able to ambulate without difficulty. He was feeling well and anticipating discharge home today.     Outpatient Plan:  - There were no medication changes  - Continue Xarelto and Plavix         Goals of Care Treatment Preferences:  Code Status: Full Code      Consults:     Significant Diagnostic Studies: Labs:   BMP:   Recent Labs   Lab 04/18/22  0941 04/18/22  1538   * 97    137   K 4.2 4.4    107   CO2 22* 23   BUN 13 12   CREATININE 1.0 0.8   CALCIUM 8.9 8.3*   , CMP   Recent Labs   Lab 04/18/22  0941 04/18/22  1538    137   K 4.2 4.4    107   CO2 22* 23   * 97   BUN 13 12   CREATININE 1.0 0.8   CALCIUM 8.9 8.3*   ANIONGAP 11 7*   ESTGFRAFRICA >60 >60   EGFRNONAA >60 >60   , CBC   Recent Labs   Lab  04/18/22  0941   WBC 10.59   HGB 15.9   HCT 48.0      , INR   Lab Results   Component Value Date    INR 1.1 09/03/2021    INR 1.0 07/25/2021    INR 1.0 10/04/2020   , Lipid Panel   Lab Results   Component Value Date    CHOL 204 (H) 07/25/2021    HDL 26 (L) 07/25/2021    LDLCALC 132.8 07/25/2021    TRIG 226 (H) 07/25/2021    CHOLHDL 12.7 (L) 07/25/2021   , Troponin No results for input(s): TROPONINI in the last 168 hours., A1C: No results for input(s): HGBA1C in the last 4320 hours. and All labs within the past 24 hours have been reviewed  Cardiac Graphics: Echocardiogram: 2D echo with color flow doppler: No results found for this or any previous visit.    Pending Diagnostic Studies:     None          There are no hospital problems to display for this patient.    No new Assessment & Plan notes have been filed under this hospital service since the last note was generated.  Service: Cardiology      Discharged Condition: good    Disposition: Home or Self Care    Follow Up:    Patient Instructions:      CBC W/ AUTO DIFFERENTIAL   Standing Status: Future Standing Exp. Date: 06/05/23     BASIC METABOLIC PANEL   Standing Status: Future Standing Exp. Date: 06/05/23     Diet Cardiac     Medications:  Reconciled Home Medications:      Medication List      CONTINUE taking these medications    atorvastatin 80 MG tablet  Commonly known as: LIPITOR  Take 1 tablet (80 mg total) by mouth every evening.     clopidogreL 75 mg tablet  Commonly known as: PLAVIX  Take 1 tablet (75 mg total) by mouth once daily.     EScitalopram oxalate 10 MG tablet  Commonly known as: LEXAPRO  Take 1 tablet (10 mg total) by mouth once daily.     rivaroxaban 20 mg Tab  Commonly known as: XARELTO  Take 1 tablet (20 mg total) by mouth before dinner.        STOP taking these medications    diclofenac sodium 1 % Gel  Commonly known as: VOLTAREN     ketoconazole 2 % cream  Commonly known as: NIZORAL            Time spent on the discharge of patient: 25  minutes    Luis Lucia MD  Cardiology  [facility] ([facility])

## 2022-04-18 NOTE — Clinical Note
An angiography of the  abdominal aorta was performed with the catheter and power injected with 40 mL contrast at at 20 mL/s.

## 2022-04-19 ENCOUNTER — TELEPHONE (OUTPATIENT)
Dept: CARDIOLOGY | Facility: CLINIC | Age: 52
End: 2022-04-19
Payer: MEDICAID

## 2022-04-19 ENCOUNTER — NURSE TRIAGE (OUTPATIENT)
Dept: ADMINISTRATIVE | Facility: CLINIC | Age: 52
End: 2022-04-19
Payer: MEDICAID

## 2022-04-19 NOTE — TELEPHONE ENCOUNTER
[place]    PCP:  Dr. Luis Portillo    Pt is trying to see when he is supposed to resume his meds.  Pt is S/P Cardiac Catheterization yesterday.  He needs to know when to resume the Plavix and Xarelto.  NOC contacted Cardiologist office and they will contact pt with further direction on when to resume his meds.  Pt contacted and advised that Cardiology will call him back and to contact NOC if he hasn't spoken with them within 1 hr.  Pt then hung up on NOC.  Message routed high priority to Cardiology staff.    Reason for Disposition   Caller has URGENT medicine question about med that PCP or specialist prescribed and triager unable to answer question    Additional Information   Negative: Intentional drug overdose and suicidal thoughts or ideas   Negative: MORE THAN A DOUBLE DOSE of a prescription or over-the-counter (OTC) drug   Negative: DOUBLE DOSE (an extra dose or lesser amount) of prescription drug and any symptoms (e.g., dizziness, nausea, pain, sleepiness)   Negative: DOUBLE DOSE (an extra dose or lesser amount) of over-the-counter (OTC) drug and any symptoms (e.g., dizziness, nausea, pain, sleepiness)   Negative: Took another person's prescription drug   Negative: DOUBLE DOSE (an extra dose or lesser amount) of prescription drug and NO symptoms (Exception: a double dose of antibiotics)   Negative: Diabetes drug error or overdose (e.g., took wrong type of insulin or took extra dose)   Negative: Caller has medication question about med not prescribed by PCP and triager unable to answer question (e.g., compatibility with other med, storage)   Negative: Prescription refill request for ESSENTIAL medicine (i.e., likelihood of harm to patient if not taken) and triager unable to refill per department policy   Negative: Prescription not at pharmacy and was prescribed by PCP recently (Exception: triager has access to EMR and prescription is recorded there. Go to Home Care and confirm for pharmacy.)   Negative:  Pharmacy calling with prescription question and triager unable to answer question    Protocols used: MEDICATION QUESTION CALL-A-OH

## 2022-04-19 NOTE — TELEPHONE ENCOUNTER
----- Message from Karen Cervantes sent at 4/19/2022 12:19 PM CDT -----  Regarding: questions  Contact: [phone]  Pt Questions    Questions:Calling to speak with the dr about what medication he should start taking today   Call Back number: [phone]

## 2022-04-19 NOTE — TELEPHONE ENCOUNTER
----- Message from French Saleem sent at 4/19/2022  4:07 PM CDT -----  Type:  Needs Medical Advice    Who Called:  Nurse on call   Symptoms (please be specific):  would like to get a  call back to discuss pt med info    Pt wants to know when he should resume taking   clopidogreL (PLAVIX) 75 mg tablet  rivaroxaban (XARELTO) 20 mg Tab  Would the patient rather a call back or a response via [facility]?  Call   Best Call Back Number:  [phone]  Additional Information:

## 2022-04-25 ENCOUNTER — TELEPHONE (OUTPATIENT)
Dept: CARDIOLOGY | Facility: CLINIC | Age: 52
End: 2022-04-25
Payer: MEDICAID

## 2022-04-25 NOTE — TELEPHONE ENCOUNTER
----- Message from Belinda Hernandez sent at 4/25/2022  1:58 PM CDT -----  Type:  Sooner Apoointment Request    Caller is requesting a sooner appointment.   Name of Caller:Tee ch  When is the first available appointment?06/26/22  Symptoms:hospital follow up  Would the patient rather a call back or a response via [employer]? Call back  Best Call Back Number:[phone]  Additional Information: Patient requesting call back for sooner appointment within next 2 weeks.

## 2022-05-03 ENCOUNTER — OFFICE VISIT (OUTPATIENT)
Dept: CARDIOLOGY | Facility: CLINIC | Age: 52
End: 2022-05-03
Payer: MEDICAID

## 2022-05-03 VITALS
OXYGEN SATURATION: 97 % | HEART RATE: 88 BPM | WEIGHT: 191.38 LBS | SYSTOLIC BLOOD PRESSURE: 124 MMHG | BODY MASS INDEX: 25.92 KG/M2 | DIASTOLIC BLOOD PRESSURE: 80 MMHG | HEIGHT: 72 IN

## 2022-05-03 DIAGNOSIS — I70.229 CRITICAL LOWER LIMB ISCHEMIA: ICD-10-CM

## 2022-05-03 DIAGNOSIS — I74.09 LERICHE SYNDROME: ICD-10-CM

## 2022-05-03 DIAGNOSIS — I70.0 AORTIC OCCLUSION: ICD-10-CM

## 2022-05-03 DIAGNOSIS — T82.868D ARTERIAL STENT THROMBOSIS, SUBSEQUENT ENCOUNTER: ICD-10-CM

## 2022-05-03 DIAGNOSIS — I70.211 ATHEROSCLEROSIS OF NATIVE ARTERY OF RIGHT LOWER EXTREMITY WITH INTERMITTENT CLAUDICATION: ICD-10-CM

## 2022-05-03 DIAGNOSIS — I70.203 ATHEROSCLEROSIS OF ARTERY OF BOTH LOWER EXTREMITIES: ICD-10-CM

## 2022-05-03 DIAGNOSIS — Z87.891 FORMER TOBACCO USE: ICD-10-CM

## 2022-05-03 DIAGNOSIS — I73.9 PAD (PERIPHERAL ARTERY DISEASE): Primary | ICD-10-CM

## 2022-05-03 PROCEDURE — 3008F BODY MASS INDEX DOCD: CPT | Mod: CPTII,,, | Performed by: INTERNAL MEDICINE

## 2022-05-03 PROCEDURE — 3074F PR MOST RECENT SYSTOLIC BLOOD PRESSURE < 130 MM HG: ICD-10-PCS | Mod: CPTII,,, | Performed by: INTERNAL MEDICINE

## 2022-05-03 PROCEDURE — 99214 PR OFFICE/OUTPT VISIT, EST, LEVL IV, 30-39 MIN: ICD-10-PCS | Mod: S$PBB,,, | Performed by: INTERNAL MEDICINE

## 2022-05-03 PROCEDURE — 3074F SYST BP LT 130 MM HG: CPT | Mod: CPTII,,, | Performed by: INTERNAL MEDICINE

## 2022-05-03 PROCEDURE — 99213 OFFICE O/P EST LOW 20 MIN: CPT | Mod: PBBFAC,PO | Performed by: INTERNAL MEDICINE

## 2022-05-03 PROCEDURE — 99214 OFFICE O/P EST MOD 30 MIN: CPT | Mod: S$PBB,,, | Performed by: INTERNAL MEDICINE

## 2022-05-03 PROCEDURE — 1160F PR REVIEW ALL MEDS BY PRESCRIBER/CLIN PHARMACIST DOCUMENTED: ICD-10-PCS | Mod: CPTII,,, | Performed by: INTERNAL MEDICINE

## 2022-05-03 PROCEDURE — 3079F DIAST BP 80-89 MM HG: CPT | Mod: CPTII,,, | Performed by: INTERNAL MEDICINE

## 2022-05-03 PROCEDURE — 1159F MED LIST DOCD IN RCRD: CPT | Mod: CPTII,,, | Performed by: INTERNAL MEDICINE

## 2022-05-03 PROCEDURE — 1160F RVW MEDS BY RX/DR IN RCRD: CPT | Mod: CPTII,,, | Performed by: INTERNAL MEDICINE

## 2022-05-03 PROCEDURE — 1159F PR MEDICATION LIST DOCUMENTED IN MEDICAL RECORD: ICD-10-PCS | Mod: CPTII,,, | Performed by: INTERNAL MEDICINE

## 2022-05-03 PROCEDURE — 99999 PR PBB SHADOW E&M-EST. PATIENT-LVL III: ICD-10-PCS | Mod: PBBFAC,,, | Performed by: INTERNAL MEDICINE

## 2022-05-03 PROCEDURE — 99999 PR PBB SHADOW E&M-EST. PATIENT-LVL III: CPT | Mod: PBBFAC,,, | Performed by: INTERNAL MEDICINE

## 2022-05-03 PROCEDURE — 3008F PR BODY MASS INDEX (BMI) DOCUMENTED: ICD-10-PCS | Mod: CPTII,,, | Performed by: INTERNAL MEDICINE

## 2022-05-03 PROCEDURE — 3079F PR MOST RECENT DIASTOLIC BLOOD PRESSURE 80-89 MM HG: ICD-10-PCS | Mod: CPTII,,, | Performed by: INTERNAL MEDICINE

## 2022-05-03 RX ORDER — ATORVASTATIN CALCIUM 80 MG/1
80 TABLET, FILM COATED ORAL NIGHTLY
Qty: 90 TABLET | Refills: 3 | OUTPATIENT
Start: 2022-05-03 | End: 2022-06-26

## 2022-05-03 NOTE — PROGRESS NOTES
"Subjective:    Patient ID:  Tee Nicole is a 51 y.o. male who presents for follow-up of Peripheral Arterial Disease      HPI    50 y/o male who presents for hospital f/u. He has a hx of PAD with aorto-iliac occlusion (Leriche's) s/p bilateral iliac stent placement, previous smoker (quit 2020) who presented initially to hospital for worsening bilateral claudication. Had previous claudication improved after peripheral intervention. Had developed again claudication of both lower extremities which started at hips and continued down legs with frequent, intermittent rest pain. Non non healing ulcers. Compliant with meds. On review of Epic, initially seen by Dr Rebollar for claudication and possible CLI of left foot 5th toe, had peripheral angiogram with distal aortic occlusion (thrombus) with bilateral iliac artery occlusion, referred to Vascular Surgery, had bilateral iliac artery intervention (Bilateral iliac kissing stents - bilateral 6 mm x 59 mm Dolores VBX, then bilateral 8 mm x 60 mm Bard LifeStents placed more proximally on 12/07/2020 with Dr. Garcia), started on DOAC, DAPT, stopped ASA, unclear when DOAC was stopped, developed bilateral worsening claudication with intermittent rest pain, presented to ED and was admitted. Had peripheral angiogram with bilateral iliac artery occlusions, aspiration thrombectomy, bilateral PTA, bilateral BMS's placed distal to covered stents, confirmed with IVUS, and discharged home on Plavix/DOAC/statin. Had initially done well since discharge with no significant claudication last visit. Had been walking and attempting to exercise.   Since then developed acute RLE "numbness" from hip to foot with some numbness of left foot. After a few days presented to ED and had CTA with:  Aorto bi-iliac stent graft with occlusion of the right common iliac stent and reconstitution at the level of the right external iliac artery.  Left common iliac stent graft is narrowed but " patent.     Occlusion or severe stenosis of the right proximal peroneal and posterior tibial arteries, noting a 2 vessel runoff to the foot similar to 07/25/2021.     Three-vessel runoff to the left lower extremity with relatively diminished flow in the left peroneal artery when compared with 07/25/2021.  In the ED, RLE numbness resolved along with left foot issues. States that it did not return, however, had right calf cramping with walking <500 steps which had resolved post intervention.  Repeat peripheral angiogram with occluded LCIA and s/p bilateral kissing balloons with excellent results. Post intervention was complaining of groin pain at access site and would not allow staff to hold pressure. Hemodynamically stable and discharged home. Post discharge developed left testicular pain. States at some point had discoloration (ecchymosis?), now resolved. Pain had worsened and was tender to touch and provoked with coughing/sneezing. Had previous arterial doppler with:  Abnormal monophasic flow throughout the right lower extremity.  Findings potentially relating to vessel hardening related to atherosclerosis versus more central upstream stenosis.  Triphasic waveforms demonstrated on the left.     Reversed flow within the right posterior tibial artery, similar.     No abnormal velocity doubling to suggest hemodynamically significant stenosis.  Previous visit was complaining of left testicular pain and was seen by Urology with normal US and labs. Pain has significantly improved and feels it only when bending forward or sneezing and is mild. Leg symptoms/claudication had improved and he was able to walk from parking area to clinic previously with no claudication. Has continued to try staying more active.   Has been having progressive claudication which has affected his lifestyle. Had been having severe, Luca IIb claudication, which had been affecting his work.     Had recent peripheral intervention:  · Severe  bilateral PAD  · On the right there is a JADIEL  (ISR), 1 vessel runoff to the foot via AT into a DP  · On the left there is severedistal JADIEL stenosis (ISR) with 1 vessel runoff to the foot via PT into a plantar arch  · Successful IVUS guided PTA with 7.0 balloon to RCIA and LCIA with final kissing ballooons with excellent results. IVUS eval after first PTA with possible wiring through stent struts with subsequent repositioing of RCIA wire and re-PTA with excellent final result  · Walking program  · Serial arterial doppler at 1, 3, 6, 12 months  · Cont Plavix/DOAC  · Procedure performed for severe, lifestyle limiting Luca IIb claudication on maximal medical therapy    Claudication now completely resolved post intervention. Has been walking regularly. Tolerating meds well and compliant. Denies CP, SOB/JUAREZ, orthopnea, PND, syncope, palps, LE edema. No non healing ulceration or signs of limb ischemia. Did have an episode of bleeding from his rectum 2 days ago.     Review of Systems   Constitutional: Negative for malaise/fatigue.   HENT: Negative for congestion.    Eyes: Negative for blurred vision.   Cardiovascular: Negative for chest pain, claudication, cyanosis, dyspnea on exertion, irregular heartbeat, leg swelling, near-syncope, orthopnea, palpitations, paroxysmal nocturnal dyspnea and syncope.   Respiratory: Negative for shortness of breath.    Endocrine: Negative for polyuria.   Hematologic/Lymphatic: Negative for bleeding problem.   Skin: Negative for itching and rash.   Musculoskeletal: Negative for joint swelling, muscle cramps and muscle weakness.   Gastrointestinal: Negative for abdominal pain, hematemesis, hematochezia, melena, nausea and vomiting.   Genitourinary: Negative for dysuria and hematuria.   Neurological: Negative for dizziness, focal weakness, headaches, light-headedness, loss of balance and weakness.   Psychiatric/Behavioral: Negative for depression. The patient is not nervous/anxious.          Objective:    Physical Exam  Constitutional:       Appearance: He is well-developed.   HENT:      Head: Normocephalic and atraumatic.   Neck:      Vascular: No JVD.   Cardiovascular:      Rate and Rhythm: Normal rate and regular rhythm.      Pulses:           Carotid pulses are 2+ on the right side and 2+ on the left side.       Radial pulses are 2+ on the right side and 2+ on the left side.        Femoral pulses are 2+ on the right side and 2+ on the left side.       Posterior tibial pulses are 1+ on the right side and 1+ on the left side.      Heart sounds: Normal heart sounds.   Pulmonary:      Effort: Pulmonary effort is normal.      Breath sounds: Normal breath sounds.   Abdominal:      General: Bowel sounds are normal.      Palpations: Abdomen is soft.   Musculoskeletal:      Cervical back: Neck supple.   Skin:     General: Skin is warm and dry.   Neurological:      Mental Status: He is alert and oriented to person, place, and time.   Psychiatric:         Behavior: Behavior normal.         Thought Content: Thought content normal.           Assessment:       1. PAD (peripheral artery disease)    2. Critical lower limb ischemia    3. Leriche syndrome    4. Atherosclerosis of native artery of right lower extremity with intermittent claudication    5. Atherosclerosis of artery of both lower extremities    6. Aortic occlusion    7. Arterial stent thrombosis, subsequent encounter    8. Former tobacco use      50 y/o pt with hx and presentation as above. Doing well from a cardiac perspective and compensated from a HF perspective. Regarding PAD, now with resolution of claudication post intervention. Appears to have a antonette-rectal blister that ruptured - needs to f/u with PCP +/- GI. Cont Plavix/DOAC/statin. Walking program, med compliance. Discussed the etiology, evaluation, and management of PAD, CLI, PTA, tobacco abuse. Discussed the importance of med compliance, heart healthy diet, and regular exercise.      Plan:       -Continue current medical management  -f/u in 3 months with arterial doppler before

## 2022-06-06 DIAGNOSIS — I73.9 PAD (PERIPHERAL ARTERY DISEASE): ICD-10-CM

## 2022-06-06 RX ORDER — CLOPIDOGREL BISULFATE 75 MG/1
75 TABLET ORAL DAILY
Qty: 90 TABLET | Refills: 3 | OUTPATIENT
Start: 2022-06-06 | End: 2022-06-26

## 2022-06-26 ENCOUNTER — HOSPITAL ENCOUNTER (EMERGENCY)
Facility: HOSPITAL | Age: 52
Discharge: HOME | End: 2022-06-26
Attending: EMERGENCY MEDICINE
Payer: MEDICAID

## 2022-06-26 VITALS
RESPIRATION RATE: 18 BRPM | WEIGHT: 195 LBS | SYSTOLIC BLOOD PRESSURE: 140 MMHG | OXYGEN SATURATION: 96 % | TEMPERATURE: 98 F | HEART RATE: 87 BPM | BODY MASS INDEX: 26.45 KG/M2 | DIASTOLIC BLOOD PRESSURE: 86 MMHG

## 2022-06-26 DIAGNOSIS — Z76.0 MEDICATION REFILL: Primary | ICD-10-CM

## 2022-06-26 PROCEDURE — 99284 EMERGENCY DEPT VISIT MOD MDM: CPT

## 2022-06-26 RX ORDER — CLOPIDOGREL BISULFATE 75 MG/1
75 TABLET ORAL DAILY
Qty: 30 TABLET | Refills: 0 | Status: SHIPPED | OUTPATIENT
Start: 2022-06-26 | End: 2022-07-22 | Stop reason: SDUPTHER

## 2022-06-26 RX ORDER — ATORVASTATIN CALCIUM 80 MG/1
80 TABLET, FILM COATED ORAL DAILY
Qty: 30 TABLET | Refills: 0 | Status: SHIPPED | OUTPATIENT
Start: 2022-06-26 | End: 2022-07-22 | Stop reason: SDUPTHER

## 2022-06-26 NOTE — ED PROVIDER NOTES
Encounter Date: 6/26/2022       History     Chief Complaint   Patient presents with    Medication Refill     Pt states he lost his medications. He had a flat tire last night and isn't sure where his medicine bag went. Pt also states he lost his cell phone. Pt is in need of Atorvastatin, Xarelto and Plavix. Pt has missed his morning doses.      51-year-old male presents to ED requesting medication refill.  Patient reports he believes he lost his medication bag while attempting to change tire on his vehicle yesterday.  He has requesting refill of his home atorvastatin, Xarelto and Plavix.  He is otherwise denying any current symptoms or complaints.  No chest pain, cough, shortness of breath, leg pain.  No other acute complaints at this time.    The history is provided by the patient.     Review of patient's allergies indicates:   Allergen Reactions    Pcn [penicillins] Anaphylaxis     Past Medical History:   Diagnosis Date    Anticoagulant long-term use     Encounter for blood transfusion     Kidney stone      Past Surgical History:   Procedure Laterality Date    ANGIOGRAPHY OF LOWER EXTREMITY Bilateral 12/7/2020    Procedure: Angiogram Extremity Unilateral;  Surgeon: Terrance Garcia MD;  Location: [facility] OR [address] [place] [address];  Service: Vascular;  Laterality: Bilateral;  Iliac, PTA, and stenting.  7.4 min   1675.32 mGy  274.28 Gycm2    AORTOGRAPHY WITH SERIALOGRAPHY N/A 11/4/2020    Procedure: AORTOGRAM, WITH SERIALOGRAPHY;  Surgeon: Luis Rebollar III, MD;  Location: [facility] CATH LAB;  Service: Cardiology;  Laterality: N/A;    PERCUTANEOUS TRANSLUMINAL ANGIOPLASTY (PTA) OF PERIPHERAL VESSEL N/A 4/18/2022    Procedure: PTA, PERIPHERAL VESSEL;  Surgeon: Raymond Terry MD;  Location: [facility] CATH LAB/EP;  Service: Cardiology;  Laterality: N/A;     Family History   Problem Relation Age of Onset    Stroke Mother     Stroke Father     No Known Problems Sister      Social History     Tobacco Use    Smoking status: Former  Smoker     Packs/day: 1.00     Types: Cigarettes    Smokeless tobacco: Never Used   Substance Use Topics    Alcohol use: Yes     Comment: occasional    Drug use: No     Review of Systems   Respiratory: Negative for cough and shortness of breath.    Cardiovascular: Negative for chest pain and palpitations.   Musculoskeletal: Negative for arthralgias and gait problem.       Physical Exam     Initial Vitals [06/26/22 1037]   BP Pulse Resp Temp SpO2   (!) 140/86 87 18 97.9 °F (36.6 °C) 96 %      MAP       --         Physical Exam    Nursing note and vitals reviewed.  Constitutional: Vital signs are normal. He appears well-developed and well-nourished. He is cooperative. He does not have a sickly appearance. He does not appear ill. No distress.   HENT:   Head: Normocephalic and atraumatic.   Eyes: EOM are normal.   Neck:   Normal range of motion.  Musculoskeletal:      Cervical back: Normal range of motion.     Neurological: He is alert and oriented to person, place, and time. GCS eye subscore is 4. GCS verbal subscore is 5. GCS motor subscore is 6.   Psychiatric: He has a normal mood and affect. His speech is normal and behavior is normal.         ED Course   Procedures  Labs Reviewed - No data to display       Imaging Results    None          Medications - No data to display  Medical Decision Making:   Initial Assessment:   Patient presents requesting medication refill after accidentally losing his medications yesterday.  Denying any current symptoms or complaints.  Vitals grossly unremarkable.  Patient otherwise well-appearing.  Differential Diagnosis:   Medication refill  ED Management:  Patient was given refill for his home Xarelto, Plavix and atorvastatin.  Encouraged have close follow-up with his PCP.                      Clinical Impression:   Final diagnoses:  [Z76.0] Medication refill (Primary)          ED Disposition Condition    Discharge Stable        ED Prescriptions     Medication Sig Dispense Start  Date End Date Auth. Provider    rivaroxaban (XARELTO) 20 mg Tab Take 1 tablet (20 mg total) by mouth daily with dinner or evening meal. 30 tablet 6/26/2022  Champ Montejo PA-C    clopidogreL (PLAVIX) 75 mg tablet Take 1 tablet (75 mg total) by mouth once daily. 30 tablet 6/26/2022 6/26/2023 Champ Montejo PA-C    atorvastatin (LIPITOR) 80 MG tablet Take 1 tablet (80 mg total) by mouth once daily. 30 tablet 6/26/2022 6/26/2023 Champ Montejo PA-C        Follow-up Information     Follow up With Specialties Details Why Contact Info    Luis Portillo MD Family Medicine   [address], [facility] [address]  [facility] [address] [phone]             Champ Montejo PA-C  06/26/22 1449

## 2022-06-26 NOTE — DISCHARGE INSTRUCTIONS

## 2022-06-27 ENCOUNTER — PATIENT OUTREACH (OUTPATIENT)
Dept: EMERGENCY MEDICINE | Facility: HOSPITAL | Age: 52
End: 2022-06-27
Payer: MEDICAID

## 2022-07-22 ENCOUNTER — OFFICE VISIT (OUTPATIENT)
Dept: FAMILY MEDICINE | Facility: HOSPITAL | Age: 52
End: 2022-07-22
Attending: FAMILY MEDICINE
Payer: MEDICAID

## 2022-07-22 VITALS
HEIGHT: 72 IN | HEART RATE: 91 BPM | SYSTOLIC BLOOD PRESSURE: 153 MMHG | DIASTOLIC BLOOD PRESSURE: 96 MMHG | BODY MASS INDEX: 27.44 KG/M2 | WEIGHT: 202.63 LBS

## 2022-07-22 DIAGNOSIS — I73.9 PAD (PERIPHERAL ARTERY DISEASE): Primary | ICD-10-CM

## 2022-07-22 DIAGNOSIS — Z11.4 ENCOUNTER FOR SCREENING FOR HIV: ICD-10-CM

## 2022-07-22 DIAGNOSIS — Z11.59 ENCOUNTER FOR HEPATITIS C SCREENING TEST FOR LOW RISK PATIENT: ICD-10-CM

## 2022-07-22 DIAGNOSIS — D22.9 BENIGN NEVUS OF SKIN: ICD-10-CM

## 2022-07-22 DIAGNOSIS — G60.9 IDIOPATHIC PERIPHERAL NEUROPATHY: ICD-10-CM

## 2022-07-22 PROCEDURE — 99213 OFFICE O/P EST LOW 20 MIN: CPT | Performed by: STUDENT IN AN ORGANIZED HEALTH CARE EDUCATION/TRAINING PROGRAM

## 2022-07-22 RX ORDER — CLOPIDOGREL BISULFATE 75 MG/1
75 TABLET ORAL DAILY
Qty: 30 TABLET | Refills: 0 | Status: SHIPPED | OUTPATIENT
Start: 2022-07-22 | End: 2022-08-25 | Stop reason: SDUPTHER

## 2022-07-22 RX ORDER — ATORVASTATIN CALCIUM 80 MG/1
80 TABLET, FILM COATED ORAL DAILY
Qty: 30 TABLET | Refills: 0 | Status: SHIPPED | OUTPATIENT
Start: 2022-07-22 | End: 2022-08-25 | Stop reason: SDUPTHER

## 2022-07-22 NOTE — PROGRESS NOTES
PROGRESS NOTE  [facility] FAMILY MEDIC[facility]    Subjective:       Patient ID: Tee Nicole is a 51 y.o. male.    Chief Complaint: Skin Tags    50 yo male with pmh of severe PAD and claudication s/p multiple revascularization procedures who presents to clinic for follow up and for skin finding on his abdomen. He is also in need of refills of his plavix, lipitor, and xarelto since loosing his medications. His PAD has been well controlled on current regimen after he got an emergency refill in the ED. His only concern is a small quarter sized area of numbness on his R foot pad. He states that he first noticed it when he first had symptoms of claudication. He does not endorse any pain nor has noticed an increase in size of this area. Discussed with him that this may have been due to nerve damage due to poor profusion from his PAD or potentially a complication of one of his procedures. Will continue to watch and wait for now as it has not grown in size and his foot appears well profused at this time.    In regards to his skin finding, he presents with a small skin colored nevus on his R mid abdomen. He states that it has been present for years and has not changed in size appreciably since it has appeared. It is not painful nor itchy and it does not change color or bleed easily. He is mostly concern by its cosmetic appearance. There is very low suspicion of this being a cancerous finding. He is interested in removal of the nevus in clinic today.     Review of Systems   Constitutional: Negative for activity change, appetite change, chills and unexpected weight change.   HENT: Negative for congestion, ear pain, facial swelling, hearing loss and rhinorrhea.    Eyes: Negative for discharge and redness.   Respiratory: Negative for cough and shortness of breath.    Cardiovascular: Negative for chest pain and leg swelling.   Gastrointestinal: Negative for abdominal distention and abdominal pain.   Genitourinary: Negative for dysuria  and flank pain.   Musculoskeletal: Negative.  Negative for back pain and myalgias.   Skin: Negative.    Neurological: Positive for numbness. Negative for facial asymmetry, speech difficulty, weakness and headaches.   Psychiatric/Behavioral: Negative for agitation and confusion.       Objective:      Vitals:    07/22/22 1109   BP: (!) 153/96   Pulse: 91     Physical Exam  Vitals and nursing note reviewed.   Constitutional:       Appearance: Normal appearance.   HENT:      Head: Normocephalic and atraumatic.   Eyes:      Pupils: Pupils are equal, round, and reactive to light.   Cardiovascular:      Rate and Rhythm: Normal rate and regular rhythm.      Pulses:           Dorsalis pedis pulses are 2+ on the right side and 2+ on the left side.      Heart sounds: Normal heart sounds.   Pulmonary:      Breath sounds: Normal breath sounds.   Abdominal:      General: Abdomen is flat.      Palpations: Abdomen is soft.   Musculoskeletal:         General: No swelling or tenderness.      Cervical back: Normal range of motion.        Feet:    Feet:      Right foot:      Skin integrity: Skin integrity normal.      Left foot:      Skin integrity: Skin integrity normal.      Comments: Monofiliment test negative bilaterally except at focal area of numbness   Skin:     General: Skin is warm.          Neurological:      General: No focal deficit present.      Mental Status: He is alert.   Psychiatric:         Mood and Affect: Mood normal.         Excision of Lesion    Date/Time: 7/28/2022 4:27 PM  Performed by: Luis Portillo MD  Authorized by: Ellen Viera MD     Consent Done?:  Yes (Verbal)  Timeout: prior to procedure the correct patient, procedure, and site was verified    Prep: patient was prepped and draped in usual sterile fashion    Local anesthesia used?: Yes    Anesthesia:  Local infiltration  Local anesthetic:  Lidocaine 1% with epinephrine  : Skin Nevus.  Body area:  Abdomen  Laterality:  Right  Position:  Supine    Patient was prepped and draped in the normal sterile fashion.  Anesthesia:  Local infiltration  Local anesthetic:  Lidocaine 1% with epinephrine  Excision type:  Skin  Malignancy:  Benign  Excision size (cm):  5  Scalpel size:  11  Incision type:  Elliptical  Specimens?: Yes     Specimens submitted to pathology.  Wound closure:  Simple  Wound repair size (cm):  5  Sutures:  Other (comments) (6 6-0 nylon stiches)  Sterile dressings:  Tegaderm and gauze  Post-op diagnosis:  Same as pre-op diagnosis.   Patient tolerated the procedure well with no immediate complications.   Post-operative instructions were provided for the patient.        Assessment:       52 yo M with pmh of PAD well controlled on current regimen who came to clinic for f/u and benign nevus removal without complication    1. PAD (peripheral artery disease)    2. BMI 27.0-27.9,adult    3. Idiopathic peripheral neuropathy    4. Encounter for screening for HIV    5. Encounter for hepatitis C screening test for low risk patient    6. Benign nevus of skin        Plan:       PAD (peripheral artery disease)  -     clopidogreL (PLAVIX) 75 mg tablet; Take 1 tablet (75 mg total) by mouth once daily.  Dispense: 30 tablet; Refill: 0  -     atorvastatin (LIPITOR) 80 MG tablet; Take 1 tablet (80 mg total) by mouth once daily.  Dispense: 30 tablet; Refill: 0  -     rivaroxaban (XARELTO) 20 mg Tab; Take 1 tablet (20 mg total) by mouth daily with dinner or evening meal.  Dispense: 30 tablet; Refill: 0    BMI 27.0-27.9,adult  -     Lipid Panel; Future; Expected date: 07/22/2022  -     Hemoglobin A1C; Future; Expected date: 07/22/2022    Idiopathic peripheral neuropathy  -     TSH; Future; Expected date: 07/22/2022  -     Vitamin B12; Future; Expected date: 07/22/2022  -     Folate; Future; Expected date: 07/22/2022    Encounter for screening for HIV  -     HIV 1/2 Ag/Ab (4th Gen); Future; Expected date: 07/22/2022    Encounter for hepatitis C screening test for low risk  patient  -     Hepatitis C Antibody; Future; Expected date: 07/22/2022    Benign nevus of skin  -     Specimen to Pathology Dermatology and skin neoplasms  -     Excision of Lesion    Other orders  -     Cancel: Intermediate Joint Aspiration/Injection        Return to clinic in 4-7 days for suture removal         Luis Portillo MD, MPH  [facility], PGY-1     This note was partially created using [employer] Voice Recognition software. Typographical and content errors may occur with this process. While efforts are made to detect and correct such errors, in some cases errors will persist. For this reason, wording in this document should be considered in the proper context and not strictly verbatim.

## 2022-07-22 NOTE — PROGRESS NOTES
Excision of Lesion    Date/Time: 7/22/2022 1:53 PM  Performed by: Luis Portillo MD  Authorized by: Ellen Viera MD     Consent Done?:  Yes (Written)  Local anesthesia used?: Yes    Anesthesia:  Local infiltration  Local anesthetic:  Lidocaine 1% with epinephrine  Anesthesia:  Local infiltration  Local anesthetic:  Lidocaine 1% with epinephrine

## 2022-07-25 ENCOUNTER — LAB VISIT (OUTPATIENT)
Dept: LAB | Facility: HOSPITAL | Age: 52
End: 2022-07-25
Attending: STUDENT IN AN ORGANIZED HEALTH CARE EDUCATION/TRAINING PROGRAM
Payer: MEDICAID

## 2022-07-25 DIAGNOSIS — G60.9 IDIOPATHIC PERIPHERAL NEUROPATHY: ICD-10-CM

## 2022-07-25 DIAGNOSIS — Z11.4 ENCOUNTER FOR SCREENING FOR HIV: ICD-10-CM

## 2022-07-25 DIAGNOSIS — Z11.59 ENCOUNTER FOR HEPATITIS C SCREENING TEST FOR LOW RISK PATIENT: ICD-10-CM

## 2022-07-25 LAB
CHOLEST SERPL-MCNC: 184 MG/DL (ref 120–199)
CHOLEST/HDLC SERPL: 4.7 {RATIO} (ref 2–5)
ESTIMATED AVG GLUCOSE: 140 MG/DL (ref 68–131)
FOLATE SERPL-MCNC: 4.7 NG/ML (ref 4–24)
HBA1C MFR BLD: 6.5 % (ref 4–5.6)
HDLC SERPL-MCNC: 39 MG/DL (ref 40–75)
HDLC SERPL: 21.2 % (ref 20–50)
LDLC SERPL CALC-MCNC: 114.4 MG/DL (ref 63–159)
NONHDLC SERPL-MCNC: 145 MG/DL
TRIGL SERPL-MCNC: 153 MG/DL (ref 30–150)
TSH SERPL DL<=0.005 MIU/L-ACNC: 1.06 UIU/ML (ref 0.4–4)
VIT B12 SERPL-MCNC: 254 PG/ML (ref 210–950)

## 2022-07-25 PROCEDURE — 80061 LIPID PANEL: CPT | Performed by: STUDENT IN AN ORGANIZED HEALTH CARE EDUCATION/TRAINING PROGRAM

## 2022-07-25 PROCEDURE — 82607 VITAMIN B-12: CPT | Performed by: STUDENT IN AN ORGANIZED HEALTH CARE EDUCATION/TRAINING PROGRAM

## 2022-07-25 PROCEDURE — 83036 HEMOGLOBIN GLYCOSYLATED A1C: CPT | Performed by: STUDENT IN AN ORGANIZED HEALTH CARE EDUCATION/TRAINING PROGRAM

## 2022-07-25 PROCEDURE — 82746 ASSAY OF FOLIC ACID SERUM: CPT | Performed by: STUDENT IN AN ORGANIZED HEALTH CARE EDUCATION/TRAINING PROGRAM

## 2022-07-25 PROCEDURE — 87389 HIV-1 AG W/HIV-1&-2 AB AG IA: CPT | Performed by: STUDENT IN AN ORGANIZED HEALTH CARE EDUCATION/TRAINING PROGRAM

## 2022-07-25 PROCEDURE — 86803 HEPATITIS C AB TEST: CPT | Performed by: STUDENT IN AN ORGANIZED HEALTH CARE EDUCATION/TRAINING PROGRAM

## 2022-07-25 PROCEDURE — 36415 COLL VENOUS BLD VENIPUNCTURE: CPT | Performed by: STUDENT IN AN ORGANIZED HEALTH CARE EDUCATION/TRAINING PROGRAM

## 2022-07-25 PROCEDURE — 84443 ASSAY THYROID STIM HORMONE: CPT | Performed by: STUDENT IN AN ORGANIZED HEALTH CARE EDUCATION/TRAINING PROGRAM

## 2022-07-26 ENCOUNTER — TELEPHONE (OUTPATIENT)
Dept: PULMONOLOGY | Facility: HOSPITAL | Age: 52
End: 2022-07-26
Payer: MEDICAID

## 2022-07-26 DIAGNOSIS — E11.9 TYPE 2 DIABETES MELLITUS WITHOUT COMPLICATION, WITHOUT LONG-TERM CURRENT USE OF INSULIN: Primary | ICD-10-CM

## 2022-07-26 LAB
HCV AB SERPL QL IA: NEGATIVE
HIV 1+2 AB+HIV1 P24 AG SERPL QL IA: NEGATIVE

## 2022-07-26 RX ORDER — METFORMIN HYDROCHLORIDE 500 MG/1
500 TABLET ORAL
Qty: 90 TABLET | Refills: 3 | Status: SHIPPED | OUTPATIENT
Start: 2022-07-26 | End: 2023-10-06 | Stop reason: ALTCHOICE

## 2022-07-26 NOTE — TELEPHONE ENCOUNTER
Called pt to inform him of his lab values and Hgb A1c of 6.5 Will start metformin and recheck values in 3 months.    Type 2 diabetes mellitus without complication, without long-term current use of insulin  -     metFORMIN (GLUCOPHAGE) 500 MG tablet; Take 1 tablet (500 mg total) by mouth daily with breakfast.  Dispense: 90 tablet; Refill: 3

## 2022-08-02 ENCOUNTER — OFFICE VISIT (OUTPATIENT)
Dept: CARDIOLOGY | Facility: CLINIC | Age: 52
End: 2022-08-02
Payer: MEDICAID

## 2022-08-02 VITALS
HEART RATE: 94 BPM | OXYGEN SATURATION: 97 % | HEIGHT: 72 IN | WEIGHT: 200.06 LBS | BODY MASS INDEX: 27.1 KG/M2 | DIASTOLIC BLOOD PRESSURE: 78 MMHG | SYSTOLIC BLOOD PRESSURE: 114 MMHG

## 2022-08-02 DIAGNOSIS — I70.229 CRITICAL LOWER LIMB ISCHEMIA: ICD-10-CM

## 2022-08-02 DIAGNOSIS — I70.211 ATHEROSCLEROSIS OF NATIVE ARTERY OF RIGHT LOWER EXTREMITY WITH INTERMITTENT CLAUDICATION: ICD-10-CM

## 2022-08-02 DIAGNOSIS — Z87.891 FORMER TOBACCO USE: ICD-10-CM

## 2022-08-02 DIAGNOSIS — I73.9 PAD (PERIPHERAL ARTERY DISEASE): Primary | ICD-10-CM

## 2022-08-02 DIAGNOSIS — I74.09 LERICHE SYNDROME: ICD-10-CM

## 2022-08-02 DIAGNOSIS — T82.868D ARTERIAL STENT THROMBOSIS, SUBSEQUENT ENCOUNTER: ICD-10-CM

## 2022-08-02 DIAGNOSIS — I70.203 ATHEROSCLEROSIS OF ARTERY OF BOTH LOWER EXTREMITIES: ICD-10-CM

## 2022-08-02 DIAGNOSIS — I70.0 AORTIC OCCLUSION: ICD-10-CM

## 2022-08-02 PROCEDURE — 3074F PR MOST RECENT SYSTOLIC BLOOD PRESSURE < 130 MM HG: ICD-10-PCS | Mod: CPTII,,, | Performed by: INTERNAL MEDICINE

## 2022-08-02 PROCEDURE — 3008F PR BODY MASS INDEX (BMI) DOCUMENTED: ICD-10-PCS | Mod: CPTII,,, | Performed by: INTERNAL MEDICINE

## 2022-08-02 PROCEDURE — 99999 PR PBB SHADOW E&M-EST. PATIENT-LVL III: ICD-10-PCS | Mod: PBBFAC,,, | Performed by: INTERNAL MEDICINE

## 2022-08-02 PROCEDURE — 3008F BODY MASS INDEX DOCD: CPT | Mod: CPTII,,, | Performed by: INTERNAL MEDICINE

## 2022-08-02 PROCEDURE — 1159F PR MEDICATION LIST DOCUMENTED IN MEDICAL RECORD: ICD-10-PCS | Mod: CPTII,,, | Performed by: INTERNAL MEDICINE

## 2022-08-02 PROCEDURE — 99214 OFFICE O/P EST MOD 30 MIN: CPT | Mod: S$PBB,,, | Performed by: INTERNAL MEDICINE

## 2022-08-02 PROCEDURE — 3044F PR MOST RECENT HEMOGLOBIN A1C LEVEL <7.0%: ICD-10-PCS | Mod: CPTII,,, | Performed by: INTERNAL MEDICINE

## 2022-08-02 PROCEDURE — 3078F DIAST BP <80 MM HG: CPT | Mod: CPTII,,, | Performed by: INTERNAL MEDICINE

## 2022-08-02 PROCEDURE — 1160F PR REVIEW ALL MEDS BY PRESCRIBER/CLIN PHARMACIST DOCUMENTED: ICD-10-PCS | Mod: CPTII,,, | Performed by: INTERNAL MEDICINE

## 2022-08-02 PROCEDURE — 99999 PR PBB SHADOW E&M-EST. PATIENT-LVL III: CPT | Mod: PBBFAC,,, | Performed by: INTERNAL MEDICINE

## 2022-08-02 PROCEDURE — 3044F HG A1C LEVEL LT 7.0%: CPT | Mod: CPTII,,, | Performed by: INTERNAL MEDICINE

## 2022-08-02 PROCEDURE — 3078F PR MOST RECENT DIASTOLIC BLOOD PRESSURE < 80 MM HG: ICD-10-PCS | Mod: CPTII,,, | Performed by: INTERNAL MEDICINE

## 2022-08-02 PROCEDURE — 3074F SYST BP LT 130 MM HG: CPT | Mod: CPTII,,, | Performed by: INTERNAL MEDICINE

## 2022-08-02 PROCEDURE — 99214 PR OFFICE/OUTPT VISIT, EST, LEVL IV, 30-39 MIN: ICD-10-PCS | Mod: S$PBB,,, | Performed by: INTERNAL MEDICINE

## 2022-08-02 PROCEDURE — 1160F RVW MEDS BY RX/DR IN RCRD: CPT | Mod: CPTII,,, | Performed by: INTERNAL MEDICINE

## 2022-08-02 PROCEDURE — 99213 OFFICE O/P EST LOW 20 MIN: CPT | Mod: PBBFAC,PO | Performed by: INTERNAL MEDICINE

## 2022-08-02 PROCEDURE — 1159F MED LIST DOCD IN RCRD: CPT | Mod: CPTII,,, | Performed by: INTERNAL MEDICINE

## 2022-08-02 NOTE — PROGRESS NOTES
"Subjective:    Patient ID:  Tee Nicole is a 51 y.o. male who presents for follow-up of Peripheral Arterial Disease      HPI    50 y/o male who presents for f/u. He has a hx of PAD with aorto-iliac occlusion (Leriche's) s/p bilateral iliac stent placement, previous smoker (quit 2020) who presented initially to hospital for worsening bilateral claudication. Had previous claudication improved after peripheral intervention. Had developed again claudication of both lower extremities which started at hips and continued down legs with frequent, intermittent rest pain. Non non healing ulcers. Compliant with meds. On review of Epic, initially seen by Dr Rebollar for claudication and possible CLI of left foot 5th toe, had peripheral angiogram with distal aortic occlusion (thrombus) with bilateral iliac artery occlusion, referred to Vascular Surgery, had bilateral iliac artery intervention (Bilateral iliac kissing stents - bilateral 6 mm x 59 mm Coyle VBX, then bilateral 8 mm x 60 mm Bard LifeStents placed more proximally on 12/07/2020 with Dr. Garcia), started on DOAC, DAPT, stopped ASA, unclear when DOAC was stopped, developed bilateral worsening claudication with intermittent rest pain, presented to ED and was admitted. Had peripheral angiogram with bilateral iliac artery occlusions, aspiration thrombectomy, bilateral PTA, bilateral BMS's placed distal to covered stents, confirmed with IVUS, and discharged home on Plavix/DOAC/statin. Had initially done well since discharge with no significant claudication last visit. Had been walking and attempting to exercise.   Since then developed acute RLE "numbness" from hip to foot with some numbness of left foot. After a few days presented to ED and had CTA with:  Aorto bi-iliac stent graft with occlusion of the right common iliac stent and reconstitution at the level of the right external iliac artery.  Left common iliac stent graft is narrowed but patent.     Occlusion or " severe stenosis of the right proximal peroneal and posterior tibial arteries, noting a 2 vessel runoff to the foot similar to 07/25/2021.     Three-vessel runoff to the left lower extremity with relatively diminished flow in the left peroneal artery when compared with 07/25/2021.  In the ED, RLE numbness resolved along with left foot issues. States that it did not return, however, had right calf cramping with walking <500 steps which had resolved post intervention.  Repeat peripheral angiogram with occluded LCIA and s/p bilateral kissing balloons with excellent results. Post intervention was complaining of groin pain at access site and would not allow staff to hold pressure. Hemodynamically stable and discharged home. Post discharge developed left testicular pain. States at some point had discoloration (ecchymosis?), now resolved. Pain had worsened and was tender to touch and provoked with coughing/sneezing. Had previous arterial doppler with:  Abnormal monophasic flow throughout the right lower extremity.  Findings potentially relating to vessel hardening related to atherosclerosis versus more central upstream stenosis.  Triphasic waveforms demonstrated on the left.     Reversed flow within the right posterior tibial artery, similar.     No abnormal velocity doubling to suggest hemodynamically significant stenosis.  Previous visit was complaining of left testicular pain and was seen by Urology with normal US and labs. Pain has significantly improved and feels it only when bending forward or sneezing and is mild. Leg symptoms/claudication had improved and he was able to walk from parking area to clinic previously with no claudication. Has continued to try staying more active.   Has been having progressive claudication which has affected his lifestyle. Had been having severe, Luca IIb claudication, which had been affecting his work.     Had recent peripheral intervention:  · Severe bilateral PAD  · On the right  there is a JADIEL  (ISR), 1 vessel runoff to the foot via AT into a DP  · On the left there is severedistal JADIEL stenosis (ISR) with 1 vessel runoff to the foot via PT into a plantar arch  · Successful IVUS guided PTA with 7.0 balloon to RCIA and LCIA with final kissing ballooons with excellent results. IVUS eval after first PTA with possible wiring through stent struts with subsequent repositioing of RCIA wire and re-PTA with excellent final result  · Walking program  · Serial arterial doppler at 1, 3, 6, 12 months  · Cont Plavix/DOAC  · Procedure performed for severe, lifestyle limiting Luca IIb claudication on maximal medical therapy    Claudication now completely resolved post intervention. Has been walking regularly. Tolerating meds well and compliant. Denies CP, SOB/JUAREZ, orthopnea, PND, syncope, palps, LE edema. No non healing ulceration or signs of limb ischemia.     Review of Systems   Constitutional: Negative for malaise/fatigue.   HENT: Negative for congestion.    Eyes: Negative for blurred vision.   Cardiovascular: Negative for chest pain, claudication, cyanosis, dyspnea on exertion, irregular heartbeat, leg swelling, near-syncope, orthopnea, palpitations, paroxysmal nocturnal dyspnea and syncope.   Respiratory: Negative for shortness of breath.    Endocrine: Negative for polyuria.   Hematologic/Lymphatic: Negative for bleeding problem.   Skin: Negative for itching and rash.   Musculoskeletal: Negative for joint swelling, muscle cramps and muscle weakness.   Gastrointestinal: Negative for abdominal pain, hematemesis, hematochezia, melena, nausea and vomiting.   Genitourinary: Negative for dysuria and hematuria.   Neurological: Negative for dizziness, focal weakness, headaches, light-headedness, loss of balance and weakness.   Psychiatric/Behavioral: Negative for depression. The patient is not nervous/anxious.         Objective:    Physical Exam  Constitutional:       Appearance: He is well-developed.    HENT:      Head: Normocephalic and atraumatic.   Neck:      Vascular: No JVD.   Cardiovascular:      Rate and Rhythm: Normal rate and regular rhythm.      Pulses:           Carotid pulses are 2+ on the right side and 2+ on the left side.       Radial pulses are 2+ on the right side and 2+ on the left side.        Femoral pulses are 2+ on the right side and 2+ on the left side.       Posterior tibial pulses are 1+ on the right side and 1+ on the left side.      Heart sounds: Normal heart sounds.   Pulmonary:      Effort: Pulmonary effort is normal.      Breath sounds: Normal breath sounds.   Abdominal:      General: Bowel sounds are normal.      Palpations: Abdomen is soft.   Musculoskeletal:      Cervical back: Neck supple.   Skin:     General: Skin is warm and dry.   Neurological:      Mental Status: He is alert and oriented to person, place, and time.   Psychiatric:         Behavior: Behavior normal.         Thought Content: Thought content normal.           Assessment:       1. PAD (peripheral artery disease)    2. Critical lower limb ischemia    3. Leriche syndrome    4. Atherosclerosis of artery of both lower extremities    5. Atherosclerosis of native artery of right lower extremity with intermittent claudication    6. Aortic occlusion    7. Arterial stent thrombosis, subsequent encounter    8. Former tobacco use      52 y/o pt with hx and presentation as above. Doing well from a cardiac perspective and compensated from a HF perspective. Regarding PAD, now with resolution of claudication post intervention. Cont Plavix/DOAC/statin. Walking program, med compliance. Discussed the etiology, evaluation, and management of PAD, CLI, PTA, tobacco abuse. Discussed the importance of med compliance, heart healthy diet, and regular exercise.     Plan:       -Continue current medical management  -f/u in 8 months

## 2022-08-12 NOTE — PROGRESS NOTES
I assume primary medical responsibility for this patient. I have reviewed the procedure note with the resident and agree that the care is reasonable and necessary. This service has been performed by a resident with the presence of a teaching physician during the entire procedure. If necessary, an addendum of additional findings or evaluation beyond the resident documentation will be noted below.     Ellen Viera MD

## 2022-08-25 ENCOUNTER — TELEPHONE (OUTPATIENT)
Dept: FAMILY MEDICINE | Facility: HOSPITAL | Age: 52
End: 2022-08-25
Payer: MEDICAID

## 2022-08-25 DIAGNOSIS — I73.9 PAD (PERIPHERAL ARTERY DISEASE): ICD-10-CM

## 2022-08-25 RX ORDER — ATORVASTATIN CALCIUM 80 MG/1
80 TABLET, FILM COATED ORAL DAILY
Qty: 30 TABLET | Refills: 0 | Status: CANCELLED | OUTPATIENT
Start: 2022-08-25 | End: 2023-08-25

## 2022-08-25 RX ORDER — RIVAROXABAN 20 MG/1
20 TABLET, FILM COATED ORAL
Qty: 30 TABLET | Refills: 0 | Status: CANCELLED | OUTPATIENT
Start: 2022-08-25

## 2022-08-25 RX ORDER — CLOPIDOGREL BISULFATE 75 MG/1
75 TABLET ORAL DAILY
Qty: 30 TABLET | Refills: 0 | Status: CANCELLED | OUTPATIENT
Start: 2022-08-25 | End: 2023-08-25

## 2022-08-25 NOTE — TELEPHONE ENCOUNTER
----- Message from Catarina Suggs sent at 8/25/2022 11:50 AM CDT -----  Adilene from Dr. Terry office's requested a refill on the following medicines,      rivaroxaban (XARELTO) 20 mg Tab  clopidogreL (PLAVIX) 75 mg tablet  atorvastatin (LIPITOR) 80 MG tablet    Adilene would like to speak with Dr. Portillo or the nurse, she says Dr. Terry can send the order if is ok with Adilene Olvera [id number]

## 2023-03-17 ENCOUNTER — TELEPHONE (OUTPATIENT)
Dept: CARDIOLOGY | Facility: CLINIC | Age: 53
End: 2023-03-17
Payer: MEDICAID

## 2023-03-17 NOTE — TELEPHONE ENCOUNTER
----- Message from Carolyn Medina sent at 3/17/2023  3:56 PM CDT -----  Type:  Needs Medical Advice    Who Called:  Pt  Would the patient rather a call back or a response via [facility]?  call  Best Call Back Number: [phone]  Additional Information:  Pt would like a call back for a sooner appt than what was offered for April.  Pt states he needs a 6 month F/U and was told he neede to be seen in March.

## 2023-03-28 ENCOUNTER — TELEPHONE (OUTPATIENT)
Dept: CARDIOLOGY | Facility: CLINIC | Age: 53
End: 2023-03-28
Payer: MEDICAID

## 2023-03-28 NOTE — TELEPHONE ENCOUNTER
----- Message from Endy Ballard sent at 3/28/2023  3:26 PM CDT -----  Contact: Pt  .Type:  Needs Medical Advice    Who Called: Pt  Would the patient rather a call back or a response via [facility]? call  Best Call Back Number: [phone]  Additional Information:    Pt stated he was told to call the office if his pain continued.

## 2023-04-24 ENCOUNTER — HOSPITAL ENCOUNTER (EMERGENCY)
Facility: HOSPITAL | Age: 53
Discharge: HOME | End: 2023-04-24
Attending: EMERGENCY MEDICINE
Payer: MEDICAID

## 2023-04-24 VITALS
OXYGEN SATURATION: 97 % | BODY MASS INDEX: 27.12 KG/M2 | DIASTOLIC BLOOD PRESSURE: 99 MMHG | HEART RATE: 74 BPM | TEMPERATURE: 98 F | SYSTOLIC BLOOD PRESSURE: 161 MMHG | RESPIRATION RATE: 20 BRPM | WEIGHT: 200 LBS

## 2023-04-24 DIAGNOSIS — R60.9 DEPENDENT EDEMA: Primary | ICD-10-CM

## 2023-04-24 DIAGNOSIS — I73.9 PAD (PERIPHERAL ARTERY DISEASE): ICD-10-CM

## 2023-04-24 DIAGNOSIS — R07.89 CHEST DISCOMFORT: ICD-10-CM

## 2023-04-24 LAB
ALBUMIN SERPL BCP-MCNC: 3.7 G/DL (ref 3.5–5.2)
ALP SERPL-CCNC: 147 U/L (ref 55–135)
ALT SERPL W/O P-5'-P-CCNC: 38 U/L (ref 10–44)
ANION GAP SERPL CALC-SCNC: 12 MMOL/L (ref 8–16)
AST SERPL-CCNC: 24 U/L (ref 10–40)
BASOPHILS # BLD AUTO: 0.06 K/UL (ref 0–0.2)
BASOPHILS NFR BLD: 0.6 % (ref 0–1.9)
BILIRUB SERPL-MCNC: 0.5 MG/DL (ref 0.1–1)
BNP SERPL-MCNC: <10 PG/ML (ref 0–99)
BUN SERPL-MCNC: 7 MG/DL (ref 6–20)
CALCIUM SERPL-MCNC: 8.8 MG/DL (ref 8.7–10.5)
CHLORIDE SERPL-SCNC: 102 MMOL/L (ref 95–110)
CO2 SERPL-SCNC: 22 MMOL/L (ref 23–29)
CREAT SERPL-MCNC: 1 MG/DL (ref 0.5–1.4)
DIFFERENTIAL METHOD: ABNORMAL
EOSINOPHIL # BLD AUTO: 0.2 K/UL (ref 0–0.5)
EOSINOPHIL NFR BLD: 1.9 % (ref 0–8)
ERYTHROCYTE [DISTWIDTH] IN BLOOD BY AUTOMATED COUNT: 14.8 % (ref 11.5–14.5)
EST. GFR  (NO RACE VARIABLE): >60 ML/MIN/1.73 M^2
GLUCOSE SERPL-MCNC: 133 MG/DL (ref 70–110)
HCT VFR BLD AUTO: 50.1 % (ref 40–54)
HGB BLD-MCNC: 16.8 G/DL (ref 14–18)
IMM GRANULOCYTES # BLD AUTO: 0.03 K/UL (ref 0–0.04)
IMM GRANULOCYTES NFR BLD AUTO: 0.3 % (ref 0–0.5)
LYMPHOCYTES # BLD AUTO: 2.3 K/UL (ref 1–4.8)
LYMPHOCYTES NFR BLD: 21.8 % (ref 18–48)
MCH RBC QN AUTO: 30.6 PG (ref 27–31)
MCHC RBC AUTO-ENTMCNC: 33.5 G/DL (ref 32–36)
MCV RBC AUTO: 91 FL (ref 82–98)
MONOCYTES # BLD AUTO: 0.9 K/UL (ref 0.3–1)
MONOCYTES NFR BLD: 8.3 % (ref 4–15)
NEUTROPHILS # BLD AUTO: 7.1 K/UL (ref 1.8–7.7)
NEUTROPHILS NFR BLD: 67.1 % (ref 38–73)
NRBC BLD-RTO: 0 /100 WBC
PLATELET # BLD AUTO: 284 K/UL (ref 150–450)
PMV BLD AUTO: 9.6 FL (ref 9.2–12.9)
POTASSIUM SERPL-SCNC: 4.1 MMOL/L (ref 3.5–5.1)
PROT SERPL-MCNC: 7.1 G/DL (ref 6–8.4)
RBC # BLD AUTO: 5.49 M/UL (ref 4.6–6.2)
SODIUM SERPL-SCNC: 136 MMOL/L (ref 136–145)
TROPONIN I SERPL DL<=0.01 NG/ML-MCNC: 0.01 NG/ML (ref 0–0.03)
TROPONIN I SERPL DL<=0.01 NG/ML-MCNC: <0.006 NG/ML (ref 0–0.03)
WBC # BLD AUTO: 10.54 K/UL (ref 3.9–12.7)

## 2023-04-24 PROCEDURE — 83880 ASSAY OF NATRIURETIC PEPTIDE: CPT | Performed by: NURSE PRACTITIONER

## 2023-04-24 PROCEDURE — 93005 ELECTROCARDIOGRAM TRACING: CPT

## 2023-04-24 PROCEDURE — 84484 ASSAY OF TROPONIN QUANT: CPT | Performed by: NURSE PRACTITIONER

## 2023-04-24 PROCEDURE — 85025 COMPLETE CBC W/AUTO DIFF WBC: CPT | Performed by: NURSE PRACTITIONER

## 2023-04-24 PROCEDURE — 80053 COMPREHEN METABOLIC PANEL: CPT | Performed by: NURSE PRACTITIONER

## 2023-04-24 PROCEDURE — 96374 THER/PROPH/DIAG INJ IV PUSH: CPT

## 2023-04-24 PROCEDURE — 99285 EMERGENCY DEPT VISIT HI MDM: CPT | Mod: 25

## 2023-04-24 PROCEDURE — 63600175 PHARM REV CODE 636 W HCPCS: Performed by: EMERGENCY MEDICINE

## 2023-04-24 PROCEDURE — 93010 ELECTROCARDIOGRAM REPORT: CPT | Mod: ,,, | Performed by: INTERNAL MEDICINE

## 2023-04-24 PROCEDURE — 84484 ASSAY OF TROPONIN QUANT: CPT | Mod: 91 | Performed by: EMERGENCY MEDICINE

## 2023-04-24 PROCEDURE — 25000003 PHARM REV CODE 250: Performed by: EMERGENCY MEDICINE

## 2023-04-24 PROCEDURE — 93010 EKG 12-LEAD: ICD-10-PCS | Mod: ,,, | Performed by: INTERNAL MEDICINE

## 2023-04-24 RX ORDER — ASPIRIN 325 MG
325 TABLET ORAL
Status: COMPLETED | OUTPATIENT
Start: 2023-04-24 | End: 2023-04-24

## 2023-04-24 RX ORDER — KETOROLAC TROMETHAMINE 30 MG/ML
15 INJECTION, SOLUTION INTRAMUSCULAR; INTRAVENOUS
Status: COMPLETED | OUTPATIENT
Start: 2023-04-24 | End: 2023-04-24

## 2023-04-24 RX ADMIN — KETOROLAC TROMETHAMINE 15 MG: 30 INJECTION, SOLUTION INTRAMUSCULAR; INTRAVENOUS at 08:04

## 2023-04-24 RX ADMIN — ASPIRIN 325 MG ORAL TABLET 325 MG: 325 PILL ORAL at 05:04

## 2023-04-24 NOTE — FIRST PROVIDER EVALUATION
Emergency Department TeleTriage Encounter Note      CHIEF COMPLAINT    Chief Complaint   Patient presents with    Leg Pain     Right lower leg/ calf pain with bilateral feet swelling that began on fri, pt has hx of PAD, with previous stent placement, walks with steady gait, denies SOB        VITAL SIGNS   Initial Vitals [04/24/23 1400]   BP Pulse Resp Temp SpO2   (!) 168/81 105 20 98.1 °F (36.7 °C) 96 %      MAP       --            ALLERGIES    Review of patient's allergies indicates:   Allergen Reactions    Pcn [penicillins] Anaphylaxis       PROVIDER TRIAGE NOTE  This is a teletriage evaluation of a 52 y.o. male presenting to the ED complaining of RLE pain and bilateral foot swelling since Friday. Denies trauma, CP and SOB. Pmhx of PAD but has never had foot swelling.     Well-appearing, ambulatory, no resp distress.     Initial orders will be placed and care will be transferred to an alternate provider when patient is roomed for a full evaluation. Any additional orders and the final disposition will be determined by that provider.         ORDERS  Labs Reviewed - No data to display    ED Orders (720h ago, onward)      Start Ordered     Status Ordering Provider    04/24/23 1436 04/24/23 1436  CBC auto differential  STAT         Ordered BROOKLYN RUSSELL.    04/24/23 1436 04/24/23 1436  Insert Saline lock IV  Once         Ordered BROOKLYN RUSSELL N.    04/24/23 1436 04/24/23 1436  Comprehensive metabolic panel  STAT         Ordered BROOKLYN RUSSELL              Virtual Visit Note: The provider triage portion of this emergency department evaluation and documentation was performed via [employer], a HIPAA-compliant telemedicine application, in concert with a tele-presenter in the room. A face to face patient evaluation with one of my colleagues will occur once the patient is placed in an emergency department room.      DISCLAIMER: This note was prepared with M*Modal voice recognition  transcription software. Garbled syntax, mangled pronouns, and other bizarre constructions may be attributed to that software system.

## 2023-04-24 NOTE — ED PROVIDER NOTES
Encounter Date: 4/24/2023       History     Chief Complaint   Patient presents with    Leg Pain     Right lower leg/ calf pain with bilateral feet swelling that began on fri, pt has hx of PAD, with previous stent placement, walks with steady gait, denies SOB      53 y/o M presents to the ER c/o right lower leg and foot pain, B/L LE swelling, and chest discomfort. All of these symptoms began a few days ago. Patient has h/o PAD and stenting. He is compliant with his plavix and xarelto. States his lower leg swelling gets better overnight and returns throughout the day. Denies any SOB, but notes intermittent chest discomfort the last few days. Denies any exertional component. No other symptoms reported at this time.     Review of patient's allergies indicates:   Allergen Reactions    Pcn [penicillins] Anaphylaxis     Past Medical History:   Diagnosis Date    Anticoagulant long-term use     Encounter for blood transfusion     Kidney stone      Past Surgical History:   Procedure Laterality Date    ANGIOGRAPHY OF LOWER EXTREMITY Bilateral 12/7/2020    Procedure: Angiogram Extremity Unilateral;  Surgeon: Terrance Garcia MD;  Location: [facility] OR [address] [place] [address];  Service: Vascular;  Laterality: Bilateral;  Iliac, PTA, and stenting.  7.4 min   1675.32 mGy  274.28 Gycm2    AORTOGRAPHY WITH SERIALOGRAPHY N/A 11/4/2020    Procedure: AORTOGRAM, WITH SERIALOGRAPHY;  Surgeon: Luis Rebollar III, MD;  Location: [facility] CATH LAB;  Service: Cardiology;  Laterality: N/A;    PERCUTANEOUS TRANSLUMINAL ANGIOPLASTY (PTA) OF PERIPHERAL VESSEL N/A 4/18/2022    Procedure: PTA, PERIPHERAL VESSEL;  Surgeon: Raymond Terry MD;  Location: [facility] CATH LAB/EP;  Service: Cardiology;  Laterality: N/A;     Family History   Problem Relation Age of Onset    Stroke Mother     Stroke Father     No Known Problems Sister      Social History     Tobacco Use    Smoking status: Former     Packs/day: 1.00     Types: Cigarettes    Smokeless tobacco: Never   Substance  Use Topics    Alcohol use: Yes     Comment: occasional    Drug use: No     Review of Systems   Constitutional:  Negative for chills and fever.   HENT:  Negative for congestion.    Respiratory:  Negative for cough and shortness of breath.    Cardiovascular:  Positive for chest pain.   Gastrointestinal:  Negative for abdominal pain.   Musculoskeletal:  Negative for back pain.   Neurological:  Negative for light-headedness and headaches.     Physical Exam     Initial Vitals [04/24/23 1400]   BP Pulse Resp Temp SpO2   (!) 168/81 105 20 98.1 °F (36.7 °C) 96 %      MAP       --         Physical Exam    Nursing note and vitals reviewed.  Constitutional: He appears well-developed and well-nourished. No distress.   HENT:   Head: Normocephalic and atraumatic.   Eyes: Conjunctivae and EOM are normal. Pupils are equal, round, and reactive to light.   Neck: Neck supple. No tracheal deviation present.   Normal range of motion.  Cardiovascular:  Normal rate and intact distal pulses.           Pulmonary/Chest: No respiratory distress.   Musculoskeletal:         General: Edema (1+ pitting edema to B/L LE, pretibial) present. No tenderness. Normal range of motion.      Cervical back: Normal range of motion and neck supple.     Neurological: He is alert and oriented to person, place, and time. He has normal strength. No cranial nerve deficit. GCS score is 15. GCS eye subscore is 4. GCS verbal subscore is 5. GCS motor subscore is 6.   Skin: Skin is warm and dry.   Lower extremities are warm, CR intact, 2+ DP pulses B/L       ED Course   Procedures  Labs Reviewed   CBC W/ AUTO DIFFERENTIAL - Abnormal; Notable for the following components:       Result Value    RDW 14.8 (*)     All other components within normal limits   COMPREHENSIVE METABOLIC PANEL - Abnormal; Notable for the following components:    CO2 22 (*)     Glucose 133 (*)     Alkaline Phosphatase 147 (*)     All other components within normal limits   TROPONIN I   B-TYPE  NATRIURETIC PEPTIDE   TROPONIN I   B-TYPE NATRIURETIC PEPTIDE   TROPONIN I     EKG Readings: (Independently Interpreted)   Initial Reading: No STEMI. Previous EKG: Compared with most recent EKG Previous EKG Date: 4/18/2022 (Minimal change). Rhythm: Normal Sinus Rhythm. Heart Rate: 84. Ectopy: No Ectopy. ST Segments: Normal ST Segments. Axis: Normal.   EKG independently interpreted by me pending cardiology reivew:          X-Rays:   Independently Interpreted Readings:   Other Readings:  CXR independently interpreted by me pending radiology review: No acute infiltrate, no pneumothorax, no effusion   Imaging Results              US Lower Extremity Arteries Right (Final result)  Result time 04/24/23 20:45:12      Final result by Javier Prescott DO (04/24/23 20:45:12)                   Impression:      1. No sonographic evidence of high-grade stenosis or occlusion.  2. Reversed flow in the PTA.  3.  Monophasic waveforms throughout the right lower extremity arteries compatible with proximal stenosis or vessel hardening from atherosclerotic disease.      Electronically signed by: Javier Prescott  Date:    04/24/2023  Time:    20:45               Narrative:    EXAMINATION:  US LOWER EXTREMITY ARTERIES RIGHT    CLINICAL HISTORY:  Peripheral vascular disease, unspecified    TECHNIQUE:  Ultrasound arterial lower extremity bilateral.    COMPARISON:  Arterial ultrasound from 11/11/2021.    FINDINGS:  Known aorto bi-iliac stent is not able to be visualized on this study due to overlying bowel gas.    Right lower extremity.    CFA: 81 cm/sec, monophasic.    DFA: 63 cm/sec, monophasic.    Prox SFA: 68 cm/sec, monophasic.    Mid SFA: 46 cm/sec, monophasic.    Dist SFA: 48 cm/sec, monophasic.    Prox pop A: 42 cm/sec, monophasic.    Distal pop A: 37 cm/sec, monophasic.    VERONICA: 43, 35, 35 (proximal/mid/distal) cm/sec, monophasic.  Collateral vessels visualized in the midportion.    PTA: Reversal of flow in the PTA.  13, 19, 26  (proximal/mid/distal) cm/sec, monophasic.  Collateral vessels visualized in the distal portion.                                       X-Ray Chest AP Portable (Final result)  Result time 04/24/23 18:14:20      Final result by Javier Prescott DO (04/24/23 18:14:20)                   Impression:      No acute abnormality.      Electronically signed by: Javier Prescott  Date:    04/24/2023  Time:    18:14               Narrative:    EXAMINATION:  XR CHEST AP PORTABLE    CLINICAL HISTORY:  Other chest pain    TECHNIQUE:  Single frontal view of the chest was performed.    COMPARISON:  11/06/2013.    FINDINGS:  The lungs are well expanded and clear. No focal opacities are seen. The pleural spaces are clear.    The cardiac silhouette is unremarkable.    The visualized osseous structures are unremarkable.                                      Medications   aspirin tablet 325 mg (325 mg Oral Given 4/24/23 1746)   ketorolac injection 15 mg (15 mg Intravenous Given 4/24/23 2033)     Medical Decision Making:   History:   Old Medical Records: I decided to obtain old medical records.  Old Records Summarized: records from clinic visits.       <> Summary of Records: Reviewed most recent cardiology note documenting baseline medications and PMH  Initial Assessment:   53 y/o M presents to the ER c/o B/L LE edema, RLE pain, chest discomfort  Differential Diagnosis:   ACS, dissection, PNA, PTX, musculoskeletal, GERD, dependent edema, PAD   Independently Interpreted Test(s):   I have ordered and independently interpreted X-rays - see prior notes.  I have ordered and independently interpreted EKG Reading(s) - see prior notes  Clinical Tests:   Lab Tests: Reviewed       <> Summary of Lab: CBC without leukocytosis, normal H/H' CMP with normal renal and liver function tests, normal electrolytes, trop negative x 2 and downtrending, bnp negative   ED Management:  Patient given some toradol. US, CXR, and labs benign. Informed of results as well  as plan to discharge with instructions on home mgmt, f/u with PCP and/or cardiology, strict return precautions given. VSS, patient reports resolution of his chest discomfort at this time.                          Clinical Impression:   Final diagnoses:  [I73.9] PAD (peripheral artery disease)  [R07.89] Chest discomfort  [R60.9] Dependent edema (Primary)        ED Disposition Condition    Discharge Stable          ED Prescriptions    None       Follow-up Information       Follow up With Specialties Details Why Contact Info    Raymond Terry MD Interventional Cardiology, Cardiology Schedule an appointment as soon as possible for a visit   [address]  [address]  [place] [id number] [phone]      Luis Portillo MD Family Medicine Schedule an appointment as soon as possible for a visit   [address]  [facility] [id number] [phone]               Farooq Benitez MD  04/24/23 2370

## 2023-04-25 NOTE — ED NOTES
Assumed care from MARK Juarez.     Patient identifiers for Tee Nicole checked and correct.    Review of patient's allergies indicates:  Review of patient's allergies indicates:   Allergen Reactions    Pcn [penicillins] Anaphylaxis     Pt appears to be resting in room, AAOx4. Respirations even and unlabored. Updated patient on POC.  Pt on continuous cm/NiBP/SpO2.  Bed locked in low position with side rails up x2, call light within reach.  No complaints at this time.  Will continue to monitor.

## 2023-04-25 NOTE — DISCHARGE INSTRUCTIONS
Please call your cardiologist in the morning for a follow up appointment for further evaluation and management. Please return with any new or worsening symptoms.

## 2023-06-06 ENCOUNTER — OFFICE VISIT (OUTPATIENT)
Dept: CARDIOLOGY | Facility: CLINIC | Age: 53
End: 2023-06-06
Payer: MEDICAID

## 2023-06-06 VITALS
SYSTOLIC BLOOD PRESSURE: 147 MMHG | DIASTOLIC BLOOD PRESSURE: 91 MMHG | HEART RATE: 116 BPM | HEIGHT: 72 IN | BODY MASS INDEX: 28.93 KG/M2 | OXYGEN SATURATION: 98 % | WEIGHT: 213.63 LBS

## 2023-06-06 DIAGNOSIS — I70.211 ATHEROSCLEROSIS OF NATIVE ARTERY OF RIGHT LOWER EXTREMITY WITH INTERMITTENT CLAUDICATION: ICD-10-CM

## 2023-06-06 DIAGNOSIS — T82.868D ARTERIAL STENT THROMBOSIS, SUBSEQUENT ENCOUNTER: ICD-10-CM

## 2023-06-06 DIAGNOSIS — R00.0 TACHYCARDIA: ICD-10-CM

## 2023-06-06 DIAGNOSIS — I74.09 LERICHE SYNDROME: ICD-10-CM

## 2023-06-06 DIAGNOSIS — I70.229 CRITICAL LOWER LIMB ISCHEMIA: ICD-10-CM

## 2023-06-06 DIAGNOSIS — Z87.891 FORMER TOBACCO USE: ICD-10-CM

## 2023-06-06 DIAGNOSIS — I73.9 PAD (PERIPHERAL ARTERY DISEASE): Primary | ICD-10-CM

## 2023-06-06 DIAGNOSIS — I70.0 AORTIC OCCLUSION: ICD-10-CM

## 2023-06-06 DIAGNOSIS — R60.1 ANASARCA: ICD-10-CM

## 2023-06-06 DIAGNOSIS — I70.203 ATHEROSCLEROSIS OF ARTERY OF BOTH LOWER EXTREMITIES: ICD-10-CM

## 2023-06-06 PROCEDURE — 3077F PR MOST RECENT SYSTOLIC BLOOD PRESSURE >= 140 MM HG: ICD-10-PCS | Mod: CPTII,,, | Performed by: INTERNAL MEDICINE

## 2023-06-06 PROCEDURE — 99214 PR OFFICE/OUTPT VISIT, EST, LEVL IV, 30-39 MIN: ICD-10-PCS | Mod: S$PBB,,, | Performed by: INTERNAL MEDICINE

## 2023-06-06 PROCEDURE — 99999 PR PBB SHADOW E&M-EST. PATIENT-LVL III: CPT | Mod: PBBFAC,,, | Performed by: INTERNAL MEDICINE

## 2023-06-06 PROCEDURE — 3008F PR BODY MASS INDEX (BMI) DOCUMENTED: ICD-10-PCS | Mod: CPTII,,, | Performed by: INTERNAL MEDICINE

## 2023-06-06 PROCEDURE — 99999 PR PBB SHADOW E&M-EST. PATIENT-LVL III: ICD-10-PCS | Mod: PBBFAC,,, | Performed by: INTERNAL MEDICINE

## 2023-06-06 PROCEDURE — 99213 OFFICE O/P EST LOW 20 MIN: CPT | Mod: PBBFAC,PO | Performed by: INTERNAL MEDICINE

## 2023-06-06 PROCEDURE — 93005 ELECTROCARDIOGRAM TRACING: CPT | Mod: PBBFAC,PO | Performed by: INTERNAL MEDICINE

## 2023-06-06 PROCEDURE — 3080F DIAST BP >= 90 MM HG: CPT | Mod: CPTII,,, | Performed by: INTERNAL MEDICINE

## 2023-06-06 PROCEDURE — 99214 OFFICE O/P EST MOD 30 MIN: CPT | Mod: S$PBB,,, | Performed by: INTERNAL MEDICINE

## 2023-06-06 PROCEDURE — 1160F PR REVIEW ALL MEDS BY PRESCRIBER/CLIN PHARMACIST DOCUMENTED: ICD-10-PCS | Mod: CPTII,,, | Performed by: INTERNAL MEDICINE

## 2023-06-06 PROCEDURE — 1159F MED LIST DOCD IN RCRD: CPT | Mod: CPTII,,, | Performed by: INTERNAL MEDICINE

## 2023-06-06 PROCEDURE — 3077F SYST BP >= 140 MM HG: CPT | Mod: CPTII,,, | Performed by: INTERNAL MEDICINE

## 2023-06-06 PROCEDURE — 3008F BODY MASS INDEX DOCD: CPT | Mod: CPTII,,, | Performed by: INTERNAL MEDICINE

## 2023-06-06 PROCEDURE — 93010 ELECTROCARDIOGRAM REPORT: CPT | Mod: S$PBB,,, | Performed by: INTERNAL MEDICINE

## 2023-06-06 PROCEDURE — 1160F RVW MEDS BY RX/DR IN RCRD: CPT | Mod: CPTII,,, | Performed by: INTERNAL MEDICINE

## 2023-06-06 PROCEDURE — 3080F PR MOST RECENT DIASTOLIC BLOOD PRESSURE >= 90 MM HG: ICD-10-PCS | Mod: CPTII,,, | Performed by: INTERNAL MEDICINE

## 2023-06-06 PROCEDURE — 1159F PR MEDICATION LIST DOCUMENTED IN MEDICAL RECORD: ICD-10-PCS | Mod: CPTII,,, | Performed by: INTERNAL MEDICINE

## 2023-06-06 PROCEDURE — 93010 EKG 12-LEAD: ICD-10-PCS | Mod: S$PBB,,, | Performed by: INTERNAL MEDICINE

## 2023-06-06 RX ORDER — EZETIMIBE 10 MG/1
10 TABLET ORAL DAILY
Qty: 90 TABLET | Refills: 3 | Status: SHIPPED | OUTPATIENT
Start: 2023-06-06 | End: 2023-09-12 | Stop reason: SDUPTHER

## 2023-06-06 NOTE — PROGRESS NOTES
"Subjective:    Patient ID:  Tee Nicole is a 52 y.o. male who presents for follow-up of Peripheral Arterial Disease      HPI  51 y/o male who presents for f/u. He has a hx of PAD with aorto-iliac occlusion (Leriche's) s/p bilateral iliac stent placement, previous smoker (quit 2020) who presented initially to hospital for worsening bilateral claudication. Had previous claudication improved after peripheral intervention. Had developed again claudication of both lower extremities which started at hips and continued down legs with frequent, intermittent rest pain. Non non healing ulcers. Compliant with meds. On review of Epic, initially seen by Dr Rebollar for claudication and possible CLI of left foot 5th toe, had peripheral angiogram with distal aortic occlusion (thrombus) with bilateral iliac artery occlusion, referred to Vascular Surgery, had bilateral iliac artery intervention (Bilateral iliac kissing stents - bilateral 6 mm x 59 mm [place] VBX, then bilateral 8 mm x 60 mm Bard LifeStents placed more proximally on 12/07/2020 with Dr. Garcia), started on DOAC, DAPT, stopped ASA, unclear when DOAC was stopped, developed bilateral worsening claudication with intermittent rest pain, presented to ED and was admitted. Had peripheral angiogram with bilateral iliac artery occlusions, aspiration thrombectomy, bilateral PTA, bilateral BMS's placed distal to covered stents, confirmed with IVUS, and discharged home on Plavix/DOAC/statin. Had initially done well since discharge with no significant claudication last visit. Had been walking and attempting to exercise.   Since then developed acute RLE "numbness" from hip to foot with some numbness of left foot. After a few days presented to ED and had CTA with:  Aorto bi-iliac stent graft with occlusion of the right common iliac stent and reconstitution at the level of the right external iliac artery.  Left common iliac stent graft is narrowed but patent.     Occlusion or " severe stenosis of the right proximal peroneal and posterior tibial arteries, noting a 2 vessel runoff to the foot similar to 07/25/2021.     Three-vessel runoff to the left lower extremity with relatively diminished flow in the left peroneal artery when compared with 07/25/2021.  In the ED, RLE numbness resolved along with left foot issues. States that it did not return, however, had right calf cramping with walking <500 steps which had resolved post intervention.  Repeat peripheral angiogram with occluded LCIA and s/p bilateral kissing balloons with excellent results. Post intervention was complaining of groin pain at access site and would not allow staff to hold pressure. Hemodynamically stable and discharged home. Post discharge developed left testicular pain. States at some point had discoloration (ecchymosis?), now resolved. Pain had worsened and was tender to touch and provoked with coughing/sneezing. Had previous arterial doppler with:  Abnormal monophasic flow throughout the right lower extremity.  Findings potentially relating to vessel hardening related to atherosclerosis versus more central upstream stenosis.  Triphasic waveforms demonstrated on the left.     Reversed flow within the right posterior tibial artery, similar.     No abnormal velocity doubling to suggest hemodynamically significant stenosis.  Previous visit was complaining of left testicular pain and was seen by Urology with normal US and labs. Pain has significantly improved and feels it only when bending forward or sneezing and is mild. Leg symptoms/claudication had improved and he was able to walk from parking area to clinic previously with no claudication. Has continued to try staying more active.   Has been having progressive claudication which has affected his lifestyle. Had been having severe, Luca IIb claudication, which had been affecting his work.     Had recent peripheral intervention:  Severe bilateral PAD  On the right there  is a JADIEL  (ISR), 1 vessel runoff to the foot via AT into a DP  On the left there is severedistal JADIEL stenosis (ISR) with 1 vessel runoff to the foot via PT into a plantar arch  Successful IVUS guided PTA with 7.0 balloon to RCIA and LCIA with final kissing ballooons with excellent results. IVUS eval after first PTA with possible wiring through stent struts with subsequent repositioing of RCIA wire and re-PTA with excellent final result  Walking program  Serial arterial doppler at 1, 3, 6, 12 months  Cont Plavix/DOAC  Procedure performed for severe, lifestyle limiting Luca IIb claudication on maximal medical therapy    Claudication now completely resolved post intervention. Has been walking regularly. Tolerating meds well and compliant. Denies CP, SOB/JUAREZ, orthopnea, PND, syncope, palps, LE edema. No non healing ulceration or signs of limb ischemia.     6/6/2023:  Since last visit presented to ED 4/24 for generalized body edema with mostly bilateral LE edema and increased abdominal girth. Negative cardiac w/u and labs acceptable except for elevated alk phos. Symptoms began abruptly about 1 week before presentation and resolved spontaneously within a week of ED presentation. No recurrence of bilateral LE edema, however, increased abd girth remains. States he was eating a lot of TV dinners before ED presentation. Has stopped taking atorvastatin 2/2 severe myalgias which improved quickly after D/C. No leg claudication, signs of limb ischemia or non healing ulceration. BP elevated in ED and today in clinic.     Review of Systems   Constitutional: Negative for malaise/fatigue.   HENT:  Negative for congestion.    Eyes:  Negative for blurred vision.   Cardiovascular:  Negative for chest pain, claudication, cyanosis, dyspnea on exertion, irregular heartbeat, leg swelling, near-syncope, orthopnea, palpitations, paroxysmal nocturnal dyspnea and syncope.   Respiratory:  Negative for shortness of breath.    Endocrine:  Negative for polyuria.   Hematologic/Lymphatic: Negative for bleeding problem.   Skin:  Negative for itching and rash.   Musculoskeletal:  Negative for joint swelling, muscle cramps and muscle weakness.   Gastrointestinal:  Positive for bloating. Negative for abdominal pain, hematemesis, hematochezia, melena, nausea and vomiting.   Genitourinary:  Negative for dysuria and hematuria.   Neurological:  Negative for dizziness, focal weakness, headaches, light-headedness, loss of balance and weakness.   Psychiatric/Behavioral:  Negative for depression. The patient is not nervous/anxious.       Objective:    Physical Exam  Constitutional:       Appearance: He is well-developed.   HENT:      Head: Normocephalic and atraumatic.   Neck:      Vascular: No JVD.   Cardiovascular:      Rate and Rhythm: Normal rate and regular rhythm.      Pulses:           Carotid pulses are 2+ on the right side and 2+ on the left side.       Radial pulses are 2+ on the right side and 2+ on the left side.        Femoral pulses are 2+ on the right side and 2+ on the left side.       Posterior tibial pulses are 1+ on the right side and 1+ on the left side.      Heart sounds: Normal heart sounds.   Pulmonary:      Effort: Pulmonary effort is normal.      Breath sounds: Normal breath sounds.   Abdominal:      General: Bowel sounds are normal.      Palpations: Abdomen is soft.   Musculoskeletal:      Cervical back: Neck supple.   Skin:     General: Skin is warm and dry.   Neurological:      Mental Status: He is alert and oriented to person, place, and time.   Psychiatric:         Behavior: Behavior normal.         Thought Content: Thought content normal.         Assessment:       1. PAD (peripheral artery disease)    2. Leriche syndrome    3. Critical lower limb ischemia    4. Atherosclerosis of native artery of right lower extremity with intermittent claudication    5. Atherosclerosis of artery of both lower extremities    6. Aortic occlusion    7.  Former tobacco use    8. Arterial stent thrombosis, subsequent encounter    9. Tachycardia    10. Anasarca      53 y/o pt with hx and presentation as above. Doing well from a cardiac perspective and compensated from a HF perspective. Regarding PAD, now with resolution of claudication post intervention. Cont Plavix/DOAC. Start Xetia. Will initiate cardiac virgen for ED symptoms including 2DE and Holter. ECG today for tachycardia. BP log BID. Walking program, med compliance. Discussed the etiology, evaluation, and management of PAD, CLI, PTA, tobacco abuse. Discussed the importance of med compliance, heart healthy diet, and regular exercise.     Plan:       -2DE  -Holter x 48 hours  -BP log BID  -f/u in 1 month

## 2023-06-21 ENCOUNTER — HOSPITAL ENCOUNTER (OUTPATIENT)
Dept: CARDIOLOGY | Facility: HOSPITAL | Age: 53
Discharge: HOME | End: 2023-06-21
Attending: INTERNAL MEDICINE
Payer: MEDICAID

## 2023-06-21 VITALS — WEIGHT: 213 LBS | BODY MASS INDEX: 28.85 KG/M2 | HEIGHT: 72 IN

## 2023-06-21 DIAGNOSIS — R00.0 TACHYCARDIA: ICD-10-CM

## 2023-06-21 DIAGNOSIS — R60.1 ANASARCA: ICD-10-CM

## 2023-06-21 PROCEDURE — 93306 TTE W/DOPPLER COMPLETE: CPT | Mod: 26,,, | Performed by: INTERNAL MEDICINE

## 2023-06-21 PROCEDURE — 93225 XTRNL ECG REC<48 HRS REC: CPT

## 2023-06-21 PROCEDURE — 93306 TTE W/DOPPLER COMPLETE: CPT

## 2023-06-21 PROCEDURE — 93227 XTRNL ECG REC<48 HR R&I: CPT | Mod: ,,, | Performed by: INTERNAL MEDICINE

## 2023-06-21 PROCEDURE — 93227 HOLTER MONITOR - 48 HOUR (CUPID ONLY): ICD-10-PCS | Mod: ,,, | Performed by: INTERNAL MEDICINE

## 2023-06-21 PROCEDURE — 93306 ECHO (CUPID ONLY): ICD-10-PCS | Mod: 26,,, | Performed by: INTERNAL MEDICINE

## 2023-06-23 LAB
ASCENDING AORTA: 2.73 CM
AV INDEX (PROSTH): 0.85
AV MEAN GRADIENT: 4 MMHG
AV PEAK GRADIENT: 6 MMHG
AV VALVE AREA: 2.95 CM2
AV VELOCITY RATIO: 0.84
BSA FOR ECHO PROCEDURE: 2.22 M2
CV ECHO LV RWT: 0.39 CM
DOP CALC AO PEAK VEL: 1.2 M/S
DOP CALC AO VTI: 21.7 CM
DOP CALC LVOT AREA: 3.5 CM2
DOP CALC LVOT DIAMETER: 2.1 CM
DOP CALC LVOT PEAK VEL: 1.01 M/S
DOP CALC LVOT STROKE VOLUME: 64.04 CM3
DOP CALCLVOT PEAK VEL VTI: 18.5 CM
E WAVE DECELERATION TIME: 194.32 MSEC
E/A RATIO: 0.81
ECHO LV POSTERIOR WALL: 0.85 CM (ref 0.6–1.1)
EJECTION FRACTION: 55 %
FRACTIONAL SHORTENING: 52 % (ref 28–44)
INTERVENTRICULAR SEPTUM: 0.8 CM (ref 0.6–1.1)
IVRT: 105.61 MSEC
LA MAJOR: 4.71 CM
LA MINOR: 4.67 CM
LA WIDTH: 3.2 CM
LEFT ATRIUM SIZE: 3.35 CM
LEFT ATRIUM VOLUME INDEX MOD: 13.8 ML/M2
LEFT ATRIUM VOLUME INDEX: 19.5 ML/M2
LEFT ATRIUM VOLUME MOD: 30.17 CM3
LEFT ATRIUM VOLUME: 42.73 CM3
LEFT INTERNAL DIMENSION IN SYSTOLE: 2.1 CM (ref 2.1–4)
LEFT VENTRICLE DIASTOLIC VOLUME INDEX: 35.35 ML/M2
LEFT VENTRICLE DIASTOLIC VOLUME: 77.42 ML
LEFT VENTRICLE MASS INDEX: 52 G/M2
LEFT VENTRICLE SYSTOLIC VOLUME INDEX: 13.6 ML/M2
LEFT VENTRICLE SYSTOLIC VOLUME: 29.88 ML
LEFT VENTRICULAR INTERNAL DIMENSION IN DIASTOLE: 4.4 CM (ref 3.5–6)
LEFT VENTRICULAR MASS: 113.97 G
LVOT MG: 2.49 MMHG
LVOT MV: 0.76 CM/S
MV PEAK A VEL: 0.86 M/S
MV PEAK E VEL: 0.7 M/S
MV STENOSIS PRESSURE HALF TIME: 56.35 MS
MV VALVE AREA P 1/2 METHOD: 3.9 CM2
OHS LV EJECTION FRACTION SIMPSONS BIPLANE MOD: 7 %
PULM VEIN S/D RATIO: 1.75
PV PEAK D VEL: 0.16 M/S
PV PEAK S VEL: 0.28 M/S
RA MAJOR: 4.49 CM
RA PRESSURE: 3 MMHG
RA WIDTH: 3 CM
RIGHT VENTRICULAR END-DIASTOLIC DIMENSION: 2.65 CM
STJ: 2.77 CM

## 2023-06-26 LAB
OHS CV EVENT MONITOR DAY: 0
OHS CV HOLTER LENGTH DECIMAL HOURS: 48
OHS CV HOLTER LENGTH HOURS: 48
OHS CV HOLTER LENGTH MINUTES: 0
OHS CV HOLTER SINUS AVERAGE HR: 93
OHS CV HOLTER SINUS MAX HR: 138
OHS CV HOLTER SINUS MIN HR: 59

## 2023-08-29 ENCOUNTER — TELEPHONE (OUTPATIENT)
Dept: CARDIOLOGY | Facility: CLINIC | Age: 53
End: 2023-08-29
Payer: MEDICAID

## 2023-08-29 NOTE — TELEPHONE ENCOUNTER
I  reached out to patient ,    And we schedule him an appointment to See .    Appointment also mailed to patient home.    Patient confirmed appointment too.      Nai Araujo (rita).                   ----- Message from Brittani Neely sent at 8/29/2023 11:33 AM CDT -----  Regarding: Pt called to speak to the nurse to schedule a follow up appt and pt states that he is doing great post surgery  Appointment Access Request:    Pt called to speak to the nurse to schedule a follow up appt and pt states that he is doing great post surgery    Pt can be reached at [phone]

## 2023-09-12 ENCOUNTER — LAB VISIT (OUTPATIENT)
Dept: LAB | Facility: HOSPITAL | Age: 53
End: 2023-09-12
Attending: INTERNAL MEDICINE
Payer: MEDICAID

## 2023-09-12 ENCOUNTER — OFFICE VISIT (OUTPATIENT)
Dept: CARDIOLOGY | Facility: CLINIC | Age: 53
End: 2023-09-12
Payer: MEDICAID

## 2023-09-12 VITALS
OXYGEN SATURATION: 95 % | DIASTOLIC BLOOD PRESSURE: 84 MMHG | HEART RATE: 111 BPM | HEIGHT: 72 IN | WEIGHT: 208.75 LBS | SYSTOLIC BLOOD PRESSURE: 127 MMHG | BODY MASS INDEX: 28.27 KG/M2

## 2023-09-12 DIAGNOSIS — I70.0 AORTIC OCCLUSION: ICD-10-CM

## 2023-09-12 DIAGNOSIS — R60.0 BILATERAL LEG EDEMA: ICD-10-CM

## 2023-09-12 DIAGNOSIS — R06.02 SOB (SHORTNESS OF BREATH): ICD-10-CM

## 2023-09-12 DIAGNOSIS — I70.229 CRITICAL LOWER LIMB ISCHEMIA: ICD-10-CM

## 2023-09-12 DIAGNOSIS — I74.09 LERICHE SYNDROME: ICD-10-CM

## 2023-09-12 DIAGNOSIS — I70.203 ATHEROSCLEROSIS OF ARTERY OF BOTH LOWER EXTREMITIES: ICD-10-CM

## 2023-09-12 DIAGNOSIS — R00.0 TACHYCARDIA: ICD-10-CM

## 2023-09-12 DIAGNOSIS — Z87.891 FORMER TOBACCO USE: ICD-10-CM

## 2023-09-12 DIAGNOSIS — I70.211 ATHEROSCLEROSIS OF NATIVE ARTERY OF RIGHT LOWER EXTREMITY WITH INTERMITTENT CLAUDICATION: ICD-10-CM

## 2023-09-12 DIAGNOSIS — T82.868D ARTERIAL STENT THROMBOSIS, SUBSEQUENT ENCOUNTER: ICD-10-CM

## 2023-09-12 DIAGNOSIS — I73.9 PAD (PERIPHERAL ARTERY DISEASE): Primary | ICD-10-CM

## 2023-09-12 LAB
ALBUMIN SERPL BCP-MCNC: 3.9 G/DL (ref 3.5–5.2)
ALP SERPL-CCNC: 125 U/L (ref 55–135)
ALT SERPL W/O P-5'-P-CCNC: 25 U/L (ref 10–44)
ANION GAP SERPL CALC-SCNC: 13 MMOL/L (ref 8–16)
AST SERPL-CCNC: 18 U/L (ref 10–40)
BASOPHILS # BLD AUTO: 0.11 K/UL (ref 0–0.2)
BASOPHILS NFR BLD: 0.8 % (ref 0–1.9)
BILIRUB SERPL-MCNC: 0.5 MG/DL (ref 0.1–1)
BUN SERPL-MCNC: 6 MG/DL (ref 6–20)
CALCIUM SERPL-MCNC: 9.1 MG/DL (ref 8.7–10.5)
CHLORIDE SERPL-SCNC: 105 MMOL/L (ref 95–110)
CO2 SERPL-SCNC: 25 MMOL/L (ref 23–29)
CREAT SERPL-MCNC: 1.1 MG/DL (ref 0.5–1.4)
DIFFERENTIAL METHOD: ABNORMAL
EOSINOPHIL # BLD AUTO: 0.4 K/UL (ref 0–0.5)
EOSINOPHIL NFR BLD: 2.6 % (ref 0–8)
ERYTHROCYTE [DISTWIDTH] IN BLOOD BY AUTOMATED COUNT: 15.9 % (ref 11.5–14.5)
EST. GFR  (NO RACE VARIABLE): >60 ML/MIN/1.73 M^2
GLUCOSE SERPL-MCNC: 155 MG/DL (ref 70–110)
HCT VFR BLD AUTO: 53.7 % (ref 40–54)
HGB BLD-MCNC: 17.5 G/DL (ref 14–18)
IMM GRANULOCYTES # BLD AUTO: 0.04 K/UL (ref 0–0.04)
IMM GRANULOCYTES NFR BLD AUTO: 0.3 % (ref 0–0.5)
LYMPHOCYTES # BLD AUTO: 3.4 K/UL (ref 1–4.8)
LYMPHOCYTES NFR BLD: 25.4 % (ref 18–48)
MCH RBC QN AUTO: 30.8 PG (ref 27–31)
MCHC RBC AUTO-ENTMCNC: 32.6 G/DL (ref 32–36)
MCV RBC AUTO: 95 FL (ref 82–98)
MONOCYTES # BLD AUTO: 0.8 K/UL (ref 0.3–1)
MONOCYTES NFR BLD: 6.1 % (ref 4–15)
NEUTROPHILS # BLD AUTO: 8.7 K/UL (ref 1.8–7.7)
NEUTROPHILS NFR BLD: 64.8 % (ref 38–73)
NRBC BLD-RTO: 0 /100 WBC
PLATELET # BLD AUTO: 299 K/UL (ref 150–450)
PMV BLD AUTO: 9.8 FL (ref 9.2–12.9)
POTASSIUM SERPL-SCNC: 5 MMOL/L (ref 3.5–5.1)
PROT SERPL-MCNC: 7.7 G/DL (ref 6–8.4)
RBC # BLD AUTO: 5.68 M/UL (ref 4.6–6.2)
SODIUM SERPL-SCNC: 143 MMOL/L (ref 136–145)
WBC # BLD AUTO: 13.37 K/UL (ref 3.9–12.7)

## 2023-09-12 PROCEDURE — 80053 COMPREHEN METABOLIC PANEL: CPT | Performed by: INTERNAL MEDICINE

## 2023-09-12 PROCEDURE — 36415 COLL VENOUS BLD VENIPUNCTURE: CPT | Performed by: INTERNAL MEDICINE

## 2023-09-12 PROCEDURE — 99999 PR PBB SHADOW E&M-EST. PATIENT-LVL III: ICD-10-PCS | Mod: PBBFAC,,, | Performed by: INTERNAL MEDICINE

## 2023-09-12 PROCEDURE — 3008F BODY MASS INDEX DOCD: CPT | Mod: CPTII,,, | Performed by: INTERNAL MEDICINE

## 2023-09-12 PROCEDURE — 1159F PR MEDICATION LIST DOCUMENTED IN MEDICAL RECORD: ICD-10-PCS | Mod: CPTII,,, | Performed by: INTERNAL MEDICINE

## 2023-09-12 PROCEDURE — 99214 PR OFFICE/OUTPT VISIT, EST, LEVL IV, 30-39 MIN: ICD-10-PCS | Mod: S$PBB,,, | Performed by: INTERNAL MEDICINE

## 2023-09-12 PROCEDURE — 3074F PR MOST RECENT SYSTOLIC BLOOD PRESSURE < 130 MM HG: ICD-10-PCS | Mod: CPTII,,, | Performed by: INTERNAL MEDICINE

## 2023-09-12 PROCEDURE — 1160F PR REVIEW ALL MEDS BY PRESCRIBER/CLIN PHARMACIST DOCUMENTED: ICD-10-PCS | Mod: CPTII,,, | Performed by: INTERNAL MEDICINE

## 2023-09-12 PROCEDURE — 1159F MED LIST DOCD IN RCRD: CPT | Mod: CPTII,,, | Performed by: INTERNAL MEDICINE

## 2023-09-12 PROCEDURE — 3079F PR MOST RECENT DIASTOLIC BLOOD PRESSURE 80-89 MM HG: ICD-10-PCS | Mod: CPTII,,, | Performed by: INTERNAL MEDICINE

## 2023-09-12 PROCEDURE — 99214 OFFICE O/P EST MOD 30 MIN: CPT | Mod: S$PBB,,, | Performed by: INTERNAL MEDICINE

## 2023-09-12 PROCEDURE — 3008F PR BODY MASS INDEX (BMI) DOCUMENTED: ICD-10-PCS | Mod: CPTII,,, | Performed by: INTERNAL MEDICINE

## 2023-09-12 PROCEDURE — 99999 PR PBB SHADOW E&M-EST. PATIENT-LVL III: CPT | Mod: PBBFAC,,, | Performed by: INTERNAL MEDICINE

## 2023-09-12 PROCEDURE — 3079F DIAST BP 80-89 MM HG: CPT | Mod: CPTII,,, | Performed by: INTERNAL MEDICINE

## 2023-09-12 PROCEDURE — 3074F SYST BP LT 130 MM HG: CPT | Mod: CPTII,,, | Performed by: INTERNAL MEDICINE

## 2023-09-12 PROCEDURE — 85025 COMPLETE CBC W/AUTO DIFF WBC: CPT | Performed by: INTERNAL MEDICINE

## 2023-09-12 PROCEDURE — 1160F RVW MEDS BY RX/DR IN RCRD: CPT | Mod: CPTII,,, | Performed by: INTERNAL MEDICINE

## 2023-09-12 PROCEDURE — 99213 OFFICE O/P EST LOW 20 MIN: CPT | Mod: PBBFAC,PO | Performed by: INTERNAL MEDICINE

## 2023-09-12 RX ORDER — EZETIMIBE 10 MG/1
10 TABLET ORAL DAILY
Qty: 90 TABLET | Refills: 3 | Status: SHIPPED | OUTPATIENT
Start: 2023-09-12 | End: 2024-09-11

## 2023-09-12 NOTE — PROGRESS NOTES
"Subjective:    Patient ID:  Tee Nicoel is a 52 y.o. male who presents for follow-up of Leg Swelling      HPI    51 y/o male who presents for f/u. He has a hx of PAD with aorto-iliac occlusion (Leriche's) s/p bilateral iliac stent placement, previous smoker (quit 2020) who presented initially to hospital for worsening bilateral claudication. Had previous claudication improved after peripheral intervention. Had developed again claudication of both lower extremities which started at hips and continued down legs with frequent, intermittent rest pain. Non non healing ulcers. Compliant with meds. On review of Epic, initially seen by Dr Rebollar for claudication and possible CLI of left foot 5th toe, had peripheral angiogram with distal aortic occlusion (thrombus) with bilateral iliac artery occlusion, referred to Vascular Surgery, had bilateral iliac artery intervention (Bilateral iliac kissing stents - bilateral 6 mm x 59 mm [place] VBX, then bilateral 8 mm x 60 mm Bard LifeStents placed more proximally on 12/07/2020 with Dr. Garcia), started on DOAC, DAPT, stopped ASA, unclear when DOAC was stopped, developed bilateral worsening claudication with intermittent rest pain, presented to ED and was admitted. Had peripheral angiogram with bilateral iliac artery occlusions, aspiration thrombectomy, bilateral PTA, bilateral BMS's placed distal to covered stents, confirmed with IVUS, and discharged home on Plavix/DOAC/statin. Had initially done well since discharge with no significant claudication last visit. Had been walking and attempting to exercise.   Since then developed acute RLE "numbness" from hip to foot with some numbness of left foot. After a few days presented to ED and had CTA with:  Aorto bi-iliac stent graft with occlusion of the right common iliac stent and reconstitution at the level of the right external iliac artery.  Left common iliac stent graft is narrowed but patent.     Occlusion or severe stenosis of " the right proximal peroneal and posterior tibial arteries, noting a 2 vessel runoff to the foot similar to 07/25/2021.     Three-vessel runoff to the left lower extremity with relatively diminished flow in the left peroneal artery when compared with 07/25/2021.  In the ED, RLE numbness resolved along with left foot issues. States that it did not return, however, had right calf cramping with walking <500 steps which had resolved post intervention.  Repeat peripheral angiogram with occluded LCIA and s/p bilateral kissing balloons with excellent results. Post intervention was complaining of groin pain at access site and would not allow staff to hold pressure. Hemodynamically stable and discharged home. Post discharge developed left testicular pain. States at some point had discoloration (ecchymosis?), now resolved. Pain had worsened and was tender to touch and provoked with coughing/sneezing. Had previous arterial doppler with:  Abnormal monophasic flow throughout the right lower extremity.  Findings potentially relating to vessel hardening related to atherosclerosis versus more central upstream stenosis.  Triphasic waveforms demonstrated on the left.     Reversed flow within the right posterior tibial artery, similar.     No abnormal velocity doubling to suggest hemodynamically significant stenosis.  Previous visit was complaining of left testicular pain and was seen by Urology with normal US and labs. Pain has significantly improved and feels it only when bending forward or sneezing and is mild. Leg symptoms/claudication had improved and he was able to walk from parking area to clinic previously with no claudication. Has continued to try staying more active.   Has been having progressive claudication which has affected his lifestyle. Had been having severe, Luca IIb claudication, which had been affecting his work.     Had recent peripheral intervention:  Severe bilateral PAD  On the right there is a JADIEL   (ISR), 1 vessel runoff to the foot via AT into a DP  On the left there is severedistal JADIEL stenosis (ISR) with 1 vessel runoff to the foot via PT into a plantar arch  Successful IVUS guided PTA with 7.0 balloon to RCIA and LCIA with final kissing ballooons with excellent results. IVUS eval after first PTA with possible wiring through stent struts with subsequent repositioing of RCIA wire and re-PTA with excellent final result  Walking program  Serial arterial doppler at 1, 3, 6, 12 months  Cont Plavix/DOAC  Procedure performed for severe, lifestyle limiting Luca IIb claudication on maximal medical therapy    Claudication now completely resolved post intervention. Has been walking regularly. Tolerating meds well and compliant. Denies CP, SOB/JUAREZ, orthopnea, PND, syncope, palps, LE edema. No non healing ulceration or signs of limb ischemia.     6/6/2023:  Since last visit presented to ED 4/24 for generalized body edema with mostly bilateral LE edema and increased abdominal girth. Negative cardiac w/u and labs acceptable except for elevated alk phos. Symptoms began abruptly about 1 week before presentation and resolved spontaneously within a week of ED presentation. No recurrence of bilateral LE edema, however, increased abd girth remains. States he was eating a lot of TV dinners before ED presentation. Has stopped taking atorvastatin 2/2 severe myalgias which improved quickly after D/C. No leg claudication, signs of limb ischemia or non healing ulceration. BP elevated in ED and today in clinic.     9/12/2023:  Since last visit, has had resolution of LE edema and improvement in abd girth. Has stopped eating TV dinners. Holter unremarkable and 2DE with:  The left ventricle is normal in size with normal systolic function.  The estimated ejection fraction is 55%.  Normal left ventricular diastolic function.  Normal right ventricular size with normal right ventricular systolic function.  Normal central venous pressure  (3 mmHg).      Review of Systems   Constitutional: Negative for malaise/fatigue.   HENT:  Negative for congestion.    Eyes:  Negative for blurred vision.   Cardiovascular:  Negative for chest pain, claudication, cyanosis, dyspnea on exertion, irregular heartbeat, leg swelling, near-syncope, orthopnea, palpitations, paroxysmal nocturnal dyspnea and syncope.   Respiratory:  Negative for shortness of breath.    Endocrine: Negative for polyuria.   Hematologic/Lymphatic: Negative for bleeding problem.   Skin:  Negative for itching and rash.   Musculoskeletal:  Negative for joint swelling, muscle cramps and muscle weakness.   Gastrointestinal:  Positive for bloating. Negative for abdominal pain, hematemesis, hematochezia, melena, nausea and vomiting.   Genitourinary:  Negative for dysuria and hematuria.   Neurological:  Negative for dizziness, focal weakness, headaches, light-headedness, loss of balance and weakness.   Psychiatric/Behavioral:  Negative for depression. The patient is not nervous/anxious.         Objective:    Physical Exam  Constitutional:       Appearance: He is well-developed.   HENT:      Head: Normocephalic and atraumatic.   Neck:      Vascular: No JVD.   Cardiovascular:      Rate and Rhythm: Normal rate and regular rhythm.      Pulses:           Carotid pulses are 2+ on the right side and 2+ on the left side.       Radial pulses are 2+ on the right side and 2+ on the left side.        Femoral pulses are 2+ on the right side and 2+ on the left side.       Posterior tibial pulses are 1+ on the right side and 1+ on the left side.      Heart sounds: Normal heart sounds.   Pulmonary:      Effort: Pulmonary effort is normal.      Breath sounds: Normal breath sounds.   Abdominal:      General: Bowel sounds are normal.      Palpations: Abdomen is soft.   Musculoskeletal:      Cervical back: Neck supple.   Skin:     General: Skin is warm and dry.   Neurological:      Mental Status: He is alert and oriented to  person, place, and time.   Psychiatric:         Behavior: Behavior normal.         Thought Content: Thought content normal.         Assessment:       1. PAD (peripheral artery disease)    2. Leriche syndrome    3. Critical lower limb ischemia    4. Atherosclerosis of native artery of right lower extremity with intermittent claudication    5. Atherosclerosis of artery of both lower extremities    6. Aortic occlusion    7. Former tobacco use    8. Arterial stent thrombosis, subsequent encounter    9. Bilateral leg edema    10. SOB (shortness of breath)    11. Tachycardia      51 y/o pt with hx and presentation as above. Doing well from a cardiac perspective and compensated from a HF perspective. Regarding PAD, now with resolution of claudication post intervention. Cont Plavix/DOAC. Restart Xetia. BP log BID. Walking program, med compliance. Discussed the etiology, evaluation, and management of PAD, CLI, PTA, tobacco abuse. Discussed the importance of med compliance, heart healthy diet, and regular exercise.     Plan:       -Restart Xetia  -Walking program  -f/u in 6 months

## 2023-09-14 ENCOUNTER — OFFICE VISIT (OUTPATIENT)
Dept: FAMILY MEDICINE | Facility: HOSPITAL | Age: 53
End: 2023-09-14
Payer: MEDICAID

## 2023-09-14 VITALS
SYSTOLIC BLOOD PRESSURE: 148 MMHG | HEIGHT: 72 IN | BODY MASS INDEX: 28.33 KG/M2 | DIASTOLIC BLOOD PRESSURE: 96 MMHG | WEIGHT: 209.19 LBS | HEART RATE: 97 BPM

## 2023-09-14 DIAGNOSIS — R73.03 PREDIABETES: ICD-10-CM

## 2023-09-14 DIAGNOSIS — F33.2 SEVERE EPISODE OF RECURRENT MAJOR DEPRESSIVE DISORDER, WITHOUT PSYCHOTIC FEATURES: ICD-10-CM

## 2023-09-14 DIAGNOSIS — R60.9 EDEMA, UNSPECIFIED TYPE: ICD-10-CM

## 2023-09-14 DIAGNOSIS — F41.1 GAD (GENERALIZED ANXIETY DISORDER): ICD-10-CM

## 2023-09-14 DIAGNOSIS — R53.83 OTHER FATIGUE: Primary | ICD-10-CM

## 2023-09-14 PROCEDURE — 99213 OFFICE O/P EST LOW 20 MIN: CPT | Performed by: STUDENT IN AN ORGANIZED HEALTH CARE EDUCATION/TRAINING PROGRAM

## 2023-09-14 RX ORDER — SERTRALINE HYDROCHLORIDE 25 MG/1
25 TABLET, FILM COATED ORAL DAILY
Qty: 30 TABLET | Refills: 11 | Status: SHIPPED | OUTPATIENT
Start: 2023-09-14 | End: 2023-10-06

## 2023-09-14 NOTE — PROGRESS NOTES
PROGRESS NOTE  [facility]    Subjective:       Patient ID: Tee Nicole is a 52 y.o. male.    Chief Complaint: Annual Exam      52-year-old man with past medical history of PAT, claudication who presents to clinic today for checkup.  Patient has been complaining of generalized fatigue, low energy recently.  Has been under significant stress for the past several years.  Father was diagnosed with pancreatic cancer and patient had to quit his job to take care father who is now , mother passed away this year.  Patient has been under significant financial stress and emotional distress from the care of his now  parents.  Denies SI, HI at this time.  Patient also is smoking again, approximately 1 cigarette per day, no longer drinks alcohol.  Was previously on Lexapro but did not like how it made him feel.  Additionally patient had episode of generalized edema 2 months ago  Review of Systems   Constitutional:  Negative for activity change, appetite change, chills and unexpected weight change.   HENT:  Negative for congestion, ear pain, facial swelling, hearing loss and rhinorrhea.    Eyes:  Negative for discharge and redness.   Respiratory:  Negative for cough and shortness of breath.    Cardiovascular:  Negative for chest pain and leg swelling.   Gastrointestinal:  Negative for abdominal distention and abdominal pain.   Genitourinary:  Negative for dysuria and flank pain.   Musculoskeletal: Negative.  Negative for back pain and myalgias.   Skin: Negative.    Neurological:  Negative for facial asymmetry, speech difficulty, weakness and headaches.   Psychiatric/Behavioral:  Positive for dysphoric mood. Negative for agitation and confusion.      Objective:      Vitals:    23 1126   BP: (!) 148/96   Pulse: 97     Body mass index is 28.37 kg/m².  Physical Exam  Vitals and nursing note reviewed.   Constitutional:       Appearance: Normal appearance.   HENT:      Head: Normocephalic and  atraumatic.   Eyes:      Pupils: Pupils are equal, round, and reactive to light.   Cardiovascular:      Rate and Rhythm: Normal rate and regular rhythm.      Heart sounds: Normal heart sounds.   Pulmonary:      Breath sounds: Normal breath sounds.   Abdominal:      General: Abdomen is flat.      Palpations: Abdomen is soft.   Musculoskeletal:         General: No swelling or tenderness.      Cervical back: Normal range of motion.   Skin:     General: Skin is warm.   Neurological:      General: No focal deficit present.      Mental Status: He is alert.   Psychiatric:         Mood and Affect: Mood normal.      Comments: PHQ 9: 18  FATMATA 7: 12       Assessment:       1. Other fatigue    2. Prediabetes    3. Edema, unspecified type    4. Severe episode of recurrent major depressive disorder, without psychotic features    5. FATMATA (generalized anxiety disorder)        [age] year old male with above past medical history here with generalized fatigue likely secondary to uncontrolled depression, anxiety    Plan:       Will rule out metabolic causes of fatigue, patient was on Lexapro before but did not tolerate, will try Zoloft at this time, considerable uterine in the future for adjunct or alternative therapy if patient does not tolerate a 2nd SSRI.    Other fatigue  -     Folate; Future; Expected date: 09/14/2023  -     Vitamin B12; Future; Expected date: 09/14/2023  -     TSH; Future; Expected date: 09/14/2023  -     TESTOSTERONE PANEL; Future; Expected date: 09/14/2023    Prediabetes  -     Hemoglobin A1C; Future; Expected date: 09/14/2023    Edema, unspecified type  -     Urinalysis; Future; Expected date: 09/14/2023    Severe episode of recurrent major depressive disorder, without psychotic features  -     sertraline (ZOLOFT) 25 MG tablet; Take 1 tablet (25 mg total) by mouth once daily.  Dispense: 30 tablet; Refill: 11    FATMATA (generalized anxiety disorder)  -     sertraline (ZOLOFT) 25 MG tablet; Take 1 tablet (25 mg  total) by mouth once daily.  Dispense: 30 tablet; Refill: 11        Follow up in: 1 month        Luis Portillo MD, MPH  [facility], PGY-3    This note was partially created using [employer] Voice Recognition software. Typographical and content errors may occur with this process. While efforts are made to detect and correct such errors, in some cases errors will persist. For this reason, wording in this document should be considered in the proper context and not strictly verbatim.

## 2023-09-15 NOTE — PROGRESS NOTES
I assume primary medical responsibility for this patient. I have reviewed the history, physical, and assessement & treatment plan with the resident and agree that the care is reasonable and necessary. This service has been performed by a resident without the presence of a teaching physician under the primary care exception. If necessary, an addendum of additional findings or evaluation beyond the resident documentation will be noted below.     Ellen Viera MD

## 2023-09-19 ENCOUNTER — PATIENT MESSAGE (OUTPATIENT)
Dept: PSYCHIATRY | Facility: HOSPITAL | Age: 53
End: 2023-09-19
Payer: MEDICAID

## 2023-10-05 ENCOUNTER — LAB VISIT (OUTPATIENT)
Dept: LAB | Facility: HOSPITAL | Age: 53
End: 2023-10-05
Attending: STUDENT IN AN ORGANIZED HEALTH CARE EDUCATION/TRAINING PROGRAM
Payer: MEDICAID

## 2023-10-05 DIAGNOSIS — R60.9 EDEMA, UNSPECIFIED TYPE: ICD-10-CM

## 2023-10-05 LAB
BILIRUB UR QL STRIP: NEGATIVE
CLARITY UR: CLEAR
COLOR UR: YELLOW
GLUCOSE UR QL STRIP: NEGATIVE
HGB UR QL STRIP: ABNORMAL
KETONES UR QL STRIP: NEGATIVE
LEUKOCYTE ESTERASE UR QL STRIP: ABNORMAL
MICROSCOPIC COMMENT: NORMAL
NITRITE UR QL STRIP: NEGATIVE
PH UR STRIP: 6 [PH] (ref 5–8)
PROT UR QL STRIP: NEGATIVE
RBC #/AREA URNS HPF: 1 /HPF (ref 0–4)
SP GR UR STRIP: 1.02 (ref 1–1.03)
SQUAMOUS #/AREA URNS HPF: 0 /HPF
URN SPEC COLLECT METH UR: ABNORMAL
UROBILINOGEN UR STRIP-ACNC: NEGATIVE EU/DL
WBC #/AREA URNS HPF: 5 /HPF (ref 0–5)

## 2023-10-05 PROCEDURE — 81000 URINALYSIS NONAUTO W/SCOPE: CPT | Performed by: STUDENT IN AN ORGANIZED HEALTH CARE EDUCATION/TRAINING PROGRAM

## 2023-10-06 ENCOUNTER — OFFICE VISIT (OUTPATIENT)
Dept: FAMILY MEDICINE | Facility: HOSPITAL | Age: 53
End: 2023-10-06
Payer: MEDICAID

## 2023-10-06 VITALS
WEIGHT: 208.13 LBS | HEART RATE: 94 BPM | SYSTOLIC BLOOD PRESSURE: 145 MMHG | BODY MASS INDEX: 28.19 KG/M2 | DIASTOLIC BLOOD PRESSURE: 98 MMHG | HEIGHT: 72 IN

## 2023-10-06 DIAGNOSIS — G56.21 ULNAR TUNNEL SYNDROME OF RIGHT WRIST: ICD-10-CM

## 2023-10-06 DIAGNOSIS — E53.8 FOLATE DEFICIENCY: ICD-10-CM

## 2023-10-06 DIAGNOSIS — R73.03 PREDIABETES: Primary | ICD-10-CM

## 2023-10-06 DIAGNOSIS — I10 PRIMARY HYPERTENSION: ICD-10-CM

## 2023-10-06 PROCEDURE — 99214 OFFICE O/P EST MOD 30 MIN: CPT | Performed by: STUDENT IN AN ORGANIZED HEALTH CARE EDUCATION/TRAINING PROGRAM

## 2023-10-06 RX ORDER — GABAPENTIN 300 MG/1
300 CAPSULE ORAL 3 TIMES DAILY
Qty: 90 CAPSULE | Refills: 11 | Status: SHIPPED | OUTPATIENT
Start: 2023-10-06 | End: 2024-10-05

## 2023-10-06 RX ORDER — HYDROCHLOROTHIAZIDE 12.5 MG/1
12.5 TABLET ORAL DAILY
Qty: 30 TABLET | Refills: 11 | Status: SHIPPED | OUTPATIENT
Start: 2023-10-06 | End: 2024-10-05

## 2023-10-06 RX ORDER — FOLIC ACID 1 MG/1
1 TABLET ORAL DAILY
Qty: 90 TABLET | Refills: 3 | Status: SHIPPED | OUTPATIENT
Start: 2023-10-06 | End: 2024-10-05

## 2023-10-06 RX ORDER — METFORMIN HYDROCHLORIDE 500 MG/1
500 TABLET, EXTENDED RELEASE ORAL
Qty: 90 TABLET | Refills: 3 | Status: SHIPPED | OUTPATIENT
Start: 2023-10-06 | End: 2024-10-05

## 2023-10-06 RX ORDER — MELOXICAM 7.5 MG/1
7.5 TABLET ORAL DAILY
Qty: 7 TABLET | Refills: 0 | Status: SHIPPED | OUTPATIENT
Start: 2023-10-06

## 2023-10-09 NOTE — PROGRESS NOTES
PROGRESS NOTE  [facility]    Subjective:       Patient ID: Tee Nicole is a 53 y.o. male.    Chief Complaint: Depression and Numbness      Patient is a 53-year-old male with past medical history of PAD with claudication, leriche syndrome, anxiety, depression who presents to clinic today for follow-up and complaints of lower extremity edema and numbness and tingling of the right upper extremity.  Patient's PID and claudication have been very well controlled recently.  He is able to walk without issue or pain for significant distances.  Patient's anxiety and depression continues to be an issue.  Patient still has significant lack of social support at home due to recent deaths in the family which adds to his depression.  Was previously switch from Lexapro to Zoloft due to poor response to Lexapro.  Patient has not had any significant benefit from the Zoloft and has had significant nightmares and lucid dreams.  Patient does not want to pursue further pharmacotherapy at this time.  Patient believes that if some of his more physical complaints are addressed his mental status may improve.  Patient is chiefly concerned with his right hand pain and numbness.  Patient hit his right elbow at the ulnar aspect some months ago and have had continued paresthesias of the 5th and 4th digit and medial aspect of 3rd digit.  Patient is tender to palpation at the medial epicondyle.  Additionally patient has concerns over hypertension, today is mildly hypertensive in clinic however shows home readings of systolics in the 170s to 190s as well as systolics in the 130s and 140s.  Diastolics ranging between 80 and 100.  Lastly patient endorses intermittent left lower extremity swelling when sleeping on his left side with associated discomfort.  Self resolves.      Review of Systems   Constitutional:  Negative for activity change, appetite change, chills and unexpected weight change.   HENT:  Negative for congestion, ear pain,  facial swelling, hearing loss and rhinorrhea.    Eyes:  Negative for discharge and redness.   Respiratory:  Negative for cough and shortness of breath.    Cardiovascular:  Negative for chest pain and leg swelling.   Gastrointestinal:  Negative for abdominal distention and abdominal pain.   Genitourinary:  Negative for dysuria and flank pain.   Musculoskeletal: Negative.  Negative for back pain and myalgias.   Skin: Negative.    Neurological:  Positive for numbness. Negative for facial asymmetry, speech difficulty, weakness and headaches.   Psychiatric/Behavioral:  Positive for dysphoric mood and sleep disturbance. Negative for agitation and confusion.        Objective:      Vitals:    10/06/23 1026   BP: (!) 145/98   Pulse: 94     Body mass index is 28.23 kg/m².  Physical Exam  Vitals and nursing note reviewed.   Constitutional:       Appearance: Normal appearance.   HENT:      Head: Normocephalic and atraumatic.   Eyes:      Pupils: Pupils are equal, round, and reactive to light.   Cardiovascular:      Rate and Rhythm: Normal rate and regular rhythm.      Heart sounds: Normal heart sounds.   Pulmonary:      Breath sounds: Normal breath sounds.   Abdominal:      General: Abdomen is flat.      Palpations: Abdomen is soft.   Musculoskeletal:         General: No swelling or tenderness.      Cervical back: Normal range of motion.      Comments: Numbness and tingling to the right 4th 5th and medial aspect of the 3rd digit.  Pain to palpation at the ulnar aspect of the right elbow with associated paresthesias to ulnar distribution.   Skin:     General: Skin is warm.   Neurological:      General: No focal deficit present.      Mental Status: He is alert.   Psychiatric:         Mood and Affect: Mood normal.         Assessment:       1. Prediabetes    2. Ulnar tunnel syndrome of right wrist    3. Primary hypertension    4. Folate deficiency        53-year-old male with above past medical history here with likely all tender  syndrome to the right wrist versus trauma to the ulnar nerve, hypertension, potentially uncontrolled and uncontrolled anxiety and depression.    Plan:       Will treat ulnar symptoms conservatively at this time Mobic gabapentin referral to physical therapy can consider injection in the future will also get x-ray of the elbow.  Will refill metformin.  Was found to be folate deficient on recent labs drawn for fatigue, will start folate supplementation.  Will also start hydrochlorothiazide for patient's hypertension and potentially helpful for patient's intermittent left lower extremity edema.  Patient does not wish to seek further therapy for his mood at this time.  Can consider Wellbutrin, mirtazapine in the future, patient is not a responder to SSRIs.    Prediabetes  -     metFORMIN (GLUCOPHAGE-XR) 500 MG ER 24hr tablet; Take 1 tablet (500 mg total) by mouth daily with breakfast.  Dispense: 90 tablet; Refill: 3    Ulnar tunnel syndrome of right wrist  -     meloxicam (MOBIC) 7.5 MG tablet; Take 1 tablet (7.5 mg total) by mouth once daily.  Dispense: 7 tablet; Refill: 0  -     gabapentin (NEURONTIN) 300 MG capsule; Take 1 capsule (300 mg total) by mouth 3 (three) times daily.  Dispense: 90 capsule; Refill: 11  -     Ambulatory referral/consult to Physical/Occupational Therapy; Future; Expected date: 10/13/2023  -     X-Ray Elbow Complete Right; Future; Expected date: 10/08/2023    Primary hypertension  -     hydroCHLOROthiazide (HYDRODIURIL) 12.5 MG Tab; Take 1 tablet (12.5 mg total) by mouth once daily.  Dispense: 30 tablet; Refill: 11    Folate deficiency  -     folic acid (FOLVITE) 1 MG tablet; Take 1 tablet (1 mg total) by mouth once daily.  Dispense: 90 tablet; Refill: 3        Follow up in: 1 month        Luis Portillo MD, MPH  [facility], PGY-3    This note was partially created using [employer] Voice Recognition software. Typographical and content errors may occur with this process. While efforts are made  to detect and correct such errors, in some cases errors will persist. For this reason, wording in this document should be considered in the proper context and not strictly verbatim.

## 2023-10-10 ENCOUNTER — CLINICAL SUPPORT (OUTPATIENT)
Dept: REHABILITATION | Facility: HOSPITAL | Age: 53
End: 2023-10-10
Payer: MEDICAID

## 2023-10-10 DIAGNOSIS — M62.81 MUSCLE WEAKNESS: ICD-10-CM

## 2023-10-10 DIAGNOSIS — Z74.09 IMPAIRED MOBILITY AND ADLS: ICD-10-CM

## 2023-10-10 DIAGNOSIS — G56.21 ULNAR TUNNEL SYNDROME OF RIGHT WRIST: ICD-10-CM

## 2023-10-10 DIAGNOSIS — Z78.9 IMPAIRED MOBILITY AND ADLS: ICD-10-CM

## 2023-10-10 PROCEDURE — 97530 THERAPEUTIC ACTIVITIES: CPT | Mod: PN

## 2023-10-10 PROCEDURE — 97165 OT EVAL LOW COMPLEX 30 MIN: CPT | Mod: PN

## 2023-10-10 NOTE — PLAN OF CARE
[facility]  Occupational Therapy Initial Evaluation     Name: Tee DOMINGUEZ [facility] Number: [id number]    Therapy Diagnosis:   Encounter Diagnoses   Name Primary?    Ulnar tunnel syndrome of right wrist     Muscle weakness     Impaired mobility and ADLs      Physician: Luis Portillo MD    Physician Orders: Eval and Treat  Medical Diagnosis: G56.21 (ICD-10-CM) - Ulnar tunnel syndrome of right wrist   Date of Injury: months ago  Evaluation Date: 10/10/2023  Insurance Authorization Period Expiration: 10/05/2024   Plan of Care Certification Period: 12/22/2023  Date of Return to MD: tbd  Visit # / Visits authorized: 1 / 1  FOTO: 1/3    Precautions:  Standard    Time In: 11:30 am   Time Out: 12:05 pm  Total Appointment Time (timed & untimed codes): 35 minutes    Subjective     Date of Onset: months ago    History of Current Condition/Mechanism of Injury: Months ago, he fell and possibly hit his elbow. Since the fall, he c/o numbness and tingling in the ulnar nerve distribution of his hand.     Involved Side: right  Dominant Side: Right    Imaging: bone scan films 10/11/2023 (TBD)    Prior Therapy: n/a    Pain:  Functional Pain Scale Rating 0-10:   6/10 on average  2/10 at best  9/10 at worst  Location: R ulnar hand  Description: Tingling, Numb, and Shooting  Aggravating Factors: constant pain, occurs when he stops using it  Easing Factors: nothing    Occupation:    Working presently: not working jared   Duties: use a torch, typing    Functional Limitations/Social History:    Previous functional status includes: Independent with all ADLs.     Current Functional Status   Home/Living environment: lives alone      Limitation of Functional Status as follows:   ADLs/IADLs:     - Feeding: mod I    - Bathing: mod I    - Dressing/Grooming: mod I    - Home Management: mod I     - Driving: mod I      Leisure: very debilitating     Patient's Goals for Therapy: decrease pain/tingling in the hand     Past  Medical History/Physical Systems Review:   Tee Nicole  has a past medical history of Anticoagulant long-term use, Encounter for blood transfusion, and Kidney stone.    Tee Nicole  has a past surgical history that includes Aortography with serialography (N/A, 11/4/2020); Angiography of lower extremity (Bilateral, 12/7/2020); and Percutaneous transluminal angioplasty (PTA) of peripheral vessel (N/A, 4/18/2022).    Tee has a current medication list which includes the following prescription(s): ezetimibe, folic acid, gabapentin, hydrochlorothiazide, meloxicam, metformin, xarelto, [DISCONTINUED] diclofenac sodium, and [DISCONTINUED] ketoconazole.    Review of patient's allergies indicates:   Allergen Reactions    Pcn [penicillins] Anaphylaxis    Statins-hmg-coa reductase inhibitors Other (See Comments)     Myalgias        Objective     Observation/Appearance: WNL ROM, slight edema of the wrist and hand, c/o tingling/numbness t/o ulnar nerve distribution    Special Tests: pos tinels at Guyon's canal; neg tinels at cubital tunnel    Edema. Measured in centimeters.   10/10/2023 10/10/2023      Left Right     Wrist Crease 17 cm 17.5 cm     Distal Palmar Crease 20 cm 21 cm     Elbow 27.5 cm 27.5 cm         Elbow and Wrist ROM. Measured in degrees.   10/10/2023 10/10/2023      Left Right     Elbow Ext/Flex WNL WNL     Supination/Pronation WNL WNL     Wrist Ext/Flex WNL WNL     Wrist RD/UD NM NM       Hand ROM. Measured in degrees.   10/10/2023 10/10/2023      Left Right      WNL WNL     Index: MP                   PIP                      DIP              Long:  MP                  PIP                  DIP              Ring:   MP                  PIP                  DIP              Small:  MP                   PIP                   DIP              Thumb: MP                    IP           Rad ABD           Pal ABD              Opposition          Strength (Dynamometer) and Pinch Strength (Pinch  Gauge)  Measured in pounds.   10/10/2023 10/10/2023      Left Right     Rung II 90 75     Jackson Pinch 14 9     3pt Pinch 16 9       Sensation: not formally assessed  -numbness/tingling of the ulnar nerve distribution; numb>tingling;       CMS Impairment/Limitation/Restriction for FOTO hand Survey    Therapist reviewed FOTO scores for Tee Nicole on 10/10/2023.   FOTO documents entered into [employer] - see Media section.    Limitation Score: 77%         Treatment     Total Treatment time (time-based codes) separate from Evaluation: 20 minutes    Tee received the following supervised modalities after being cleared for contradictions for 10 minutes:   -Patient tolerates tx of therapeutic fluidotherapy in order to decrease hypersensitivity, decrease pain, and/or increase soft tissue extensibility during AROM.      Tee received therapeutic activities for 10 minutes including:  - HEP review  - TGEs in fluido  - ulnar nerve glides  - doorway stretches     Patient Education and Home Exercises      Education provided:   -role of OT, goals for OT, scheduling/cancellations, insurance limitations with patient.  -Additional Education provided:   - HEP in place    Written Home Exercises Provided: yes.  Exercises were reviewed and Tee was able to demonstrate them prior to the end of the session.    Tee demonstrated good  understanding of the education provided.     Pt was advised to perform these exercises free of pain, and to stop performing them if pain occurs.    See EMR under Patient Instructions for exercises provided 10/10/2023.    Assessment     Tee Nicole is a 53 y.o. male referred to outpatient occupational therapy and presents with a medical diagnosis of R ulnar nerve entrapment. Months ago, he fell and possibly hit his elbow. Since the fall, he c/o numbness and tingling in the ulnar nerve distribution of his hand. His RUE ROM is full, but there is some swelling at the wrist and hand. He notes some  "sensitivity to hot temperatures and states that "it feels like an ice cube between my pinky and ring finger." I ed patient on avoiding positions that would compress the nerve more and sleeping positioning to avoid increased ulnar nerve entrapment. He states that he is currently unable to work or perform leisure activities since the RUE deficits are so debilitating.   Upon assessment, deficits include: numbness and tingling in the ulnar nerve distribution of the hand, muscle weakness, tenderness at the medial epicondyle  Patient also reports functional difficulty with: writing, typing, sleeping, pain/difficulty performing B/IADLs. He currently lives alone so he does not have any additional assistance or social support with these activities.   I initiate HEP per protocol including: TGEs, ulnar nerve glides, posture stretching. Patient received written handouts and demos indep with HEP. Skilled education provided to patient as indicated. Continue to progress per protocol as tolerated until patient meets LTGs.        Assessment of Occupational Performance   Performance Deficits    Physical:  Joint Mobility  Joint Stability  Muscle Power/Strength  Muscle Endurance  Skin Integrity/Scar Formation  Edema  Control of Voluntary Movement   Strength  Pinch Strength  Gross Motor Coordination  Fine Motor Coordination  Proprioception Functions  Tactile Functions  Muscle Tone  Postural Control  Pain    Cognitive:  No Deficits    Psychosocial:    Family Support     Following medical record review it is determined that pt will benefit from occupational therapy services in order to maximize pain free and/or functional use of right UE. The following goals were discussed with the patient and patient is in agreement with them as to be addressed in the treatment plan. The patient's rehab potential is Fair.     Anticipated barriers to occupational therapy: lack of social support, anxiety, depression    Plan of care discussed with " patient: Yes  Patient's spiritual, cultural and educational needs considered and patient is agreeable to the plan of care and goals as stated below:     Medical Necessity is demonstrated by the following  Occupational Profile/History  Co-morbidities and personal factors that may impact the plan of care [x] LOW: Brief chart review  [] MODERATE: Expanded chart review   [] HIGH: Extensive chart review    Moderate / High Support Documentation: n/a     Examination  Performance deficits relating to physical, cognitive or psychosocial skills that result in activity limitations and/or participation restrictions  [x] LOW: addressing 1-3 Performance deficits  [] MODERATE: 3-5 Performance deficits  [] HIGH: 5+ Performance deficits (please support below)    Moderate / High Support Documentation: n/a     Treatment Options [x] LOW: Limited options  [] MODERATE: Several options  [] HIGH: Multiple options      Decision Making/ Complexity Score: low       The following goals were discussed with the patient and patient is in agreement with them as to be addressed in the treatment plan.     Goals:   The following goals were discussed with the patient and patient is in agreement with them as to be addressed in the treatment plan.   Short term Goals:  1) Initiate HEP  2) Pt will reduce edema by .5-1 cm in affected wrist/hand by 4 weeks.  3) Pt will reduce pain to less than 4/10 by 4 weeks.  4) Pt will increase functional  strength by 5# in order to A in opening containers for med management or home management tasks by 4 weeks.   5) Patient will be able to achieve less than or equal to 60% on the FOTO, demonstrating overall improved functional ability with upper extremity. (Self-care category)    Long Term Goals:  Goals to be met by discharge:  1) Independent with HEP  2) Pt will decrease edema to trace or none to increase functional ROM by d/c.   3) Pt will decrease pain to trace or none while completing light home management tasks  or work related tasks by d/c.   4) Patient will be able to achieve less than or equal to 45% on the FOTO, demonstrating overall improved functional ability with upper extremity.  (Self-care category)      Plan     Certification Period/Plan of care expiration: 10/10/2023 to 12/22/2023.    Outpatient Occupational Therapy 1 times weekly for 8 weeks to include the following interventions: Paraffin, Fluidotherapy, Manual therapy/joint mobilizations, Modalities for pain management, US 3 mhz, Therapeutic exercises/activities., Iontophoresis with 2.0 cc Dexamethasone, Strengthening, Orthotic Fabrication/Fit/Training, Edema Control, Scar Management, Wound Care, Electrical Modalities, Joint Protection, and Energy Conservation.    Mary Hoover, OT

## 2023-10-12 ENCOUNTER — PATIENT MESSAGE (OUTPATIENT)
Dept: RESPIRATORY THERAPY | Facility: HOSPITAL | Age: 53
End: 2023-10-12
Payer: MEDICAID

## 2023-10-13 ENCOUNTER — PATIENT MESSAGE (OUTPATIENT)
Dept: CARDIOLOGY | Facility: CLINIC | Age: 53
End: 2023-10-13
Payer: MEDICAID

## 2023-10-17 ENCOUNTER — TELEPHONE (OUTPATIENT)
Dept: CARDIOLOGY | Facility: CLINIC | Age: 53
End: 2023-10-17
Payer: MEDICAID

## 2023-10-17 NOTE — TELEPHONE ENCOUNTER
"Patient called asking for advised on symptom of "having a spot under his right foot for a while and getter a little bigger and its hurts constantly"  patient denied any fever with pain scale of 8-10.      As per Dr Terry, patient should contact his PCP or to an Urgent Care to be evaluate it.   Dr Terry will no longer be at any [facility] facilities.  Medicaid hotline was given to patient [phone].   ED precautions was advised.   Patient verbalized understanding on instruction given.    "

## 2023-10-17 NOTE — TELEPHONE ENCOUNTER
----- Message from Tracy Fazal sent at 10/17/2023  9:26 AM CDT -----  Type:  Sooner Apoointment Request    Caller is requesting a sooner appointment.  Caller declined first available appointment listed below.  Caller will not accept being placed on the waitlist and is requesting a message be sent to doctor.  Name of Caller: pt  When is the first available appointment? none  Symptoms: f/u 3 surgeries he done  Would the patient rather a call back or a response via [facility]? call  Best Call Back Number:[phone]  Additional Information:

## 2023-10-25 NOTE — PROGRESS NOTES
"  Occupational Therapy Daily Treatment Note     Name: Tee DOMINGUEZ [facility] Number: [id number]    Therapy Diagnosis:   Encounter Diagnoses   Name Primary?    Muscle weakness Yes    Impaired mobility and ADLs      Physician: Luis Portillo MD    Visit Date: 10/26/2023  Physician Orders: Eval and Treat  Medical Diagnosis: G56.21 (ICD-10-CM) - Ulnar tunnel syndrome of right wrist   Date of Injury: months ago  Evaluation Date: 10/10/2023  Insurance Authorization Period Expiration: 10/05/2024   Plan of Care Certification Period: 12/22/2023  Date of Return to MD: tbd  Visit # / Visits authorized: 2 / 20  FOTO: 1/3     Precautions:  Standard  Time In: 1:50 pm  Time Out: 2:35 pm  Total Billable Time: 45 minutes      Subjective     Pt reports: "I tried to go back to work, but it didn't go great, so I haven't gone back again."  he was compliant with home exercise program given last session.   Response to previous treatment:decrease in pain  Functional change: no change - attempted to return to work but didn't go well    Pain: 1/10  Location: right wrist    Objective   Observation/Appearance: WNL ROM, slight edema of the wrist and hand, c/o tingling/numbness t/o ulnar nerve distribution     Special Tests: pos tinels at Guyon's canal; neg tinels at cubital tunnel     Edema. Measured in centimeters.    10/10/2023 10/10/2023         Left Right       Wrist Crease 17 cm 17.5 cm       Distal Palmar Crease 20 cm 21 cm       Elbow 27.5 cm 27.5 cm             Elbow and Wrist ROM. Measured in degrees.    10/10/2023 10/10/2023         Left Right       Elbow Ext/Flex WNL WNL       Supination/Pronation WNL WNL       Wrist Ext/Flex WNL WNL       Wrist RD/UD NM NM          Hand ROM. Measured in degrees.    10/10/2023 10/10/2023         Left Right         WNL WNL       Index: MP                       PIP                          DIP                       Long:  MP                      PIP                      DIP                       Ring:   " MP                      PIP                      DIP                       Small:  MP                       PIP                       DIP                       Thumb: MP                        IP               Rad ABD               Pal ABD                  Opposition               Strength (Dynamometer) and Pinch Strength (Pinch Gauge)  Measured in pounds.    10/10/2023 10/10/2023         Left Right       Rung II 90 75       Jackson Pinch 14 9       3pt Pinch 16 9          Sensation: not formally assessed  -numbness/tingling of the ulnar nerve distribution; numb>tingling;         CMS Impairment/Limitation/Restriction for FOTO hand Survey     Therapist reviewed FOTO scores for Tee Nicole on 10/10/2023.   FOTO documents entered into [employer] - see Media section.     Limitation Score: 77%            Treatment      Tee received the following supervised modalities after being cleared for contradictions for 10 minutes:   -Patient tolerates MHP x 10 min in order to decrease pain and increase soft tissue extensibility in preparation for ROM, concurrent with assessment of deficits.       Tee participated in dynamic functional therapeutic activities to improve functional performance for 40  minutes, including:  -Sensory retraining   -IASTM to ulnar wrist x 3 min   -vibration to ulnar wrist x 3 min   -brushing to ulnar wrist x 3 min   -spike ball manipulation x 2 min  -isospheres x 3 min  -Therapist A neural glides    -emphasis on L side to ease R sided tension  -Blue Tband rows  -angels x 10  -SL open books x 20        Home Exercises and Education Provided     Education provided:   - HEP in place  - Progress towards goals     Written Home Exercises Provided: Patient instructed to cont prior HEP.  Exercises were reviewed and Tee was able to demonstrate them prior to the end of the session.  Tee demonstrated good  understanding of the HEP provided.   .   See EMR under Patient Instructions for exercises provided   "at initial evaluation .        Assessment     Upon arrival, patient states that he hasn't currently had much pain, but more of a discomfort in the ulnar distribution of the RUE. He states that he attempted to return to work, but it didn't go well because he feels as though he has "lost feeling" in the stated area. He states that he notes an exacerbation in pain with elbow flexion with activities such as putting his phone to his ear. Today's tx emphasized neuro re-education and sensory retraining to the ulnar nerve distribution. He tolerates therapist guided ulnar nerve glides in supine on (L) to ease neural tension on the R side. Ulnar nerve glides also attemped on the R side which provoked some tingling sensations. I also instructed patient on postural exs to A with stability and prevent any additional proximal compression on the ulnar nerve. Patient tolerates these well today. No pain noted following tx session today. Pt would continue to benefit from skilled OT. Continue skilled OT POC and progress per protocol as tolerated.      Tee is progressing well towards his goals and there are no updates to goals at this time. Pt prognosis is Good.     Pt will continue to benefit from skilled outpatient occupational therapy to address the deficits listed in the problem list on initial evaluation provide pt/family education and to maximize pt's level of independence in the home and community environment.     Anticipated barriers to occupational therapy: lack of social support, anxiety, depression    Pt's spiritual, cultural and educational needs considered and pt agreeable to plan of care and goals.    Goals:  The following goals were discussed with the patient and patient is in agreement with them as to be addressed in the treatment plan.   Short term Goals:  1) Initiate HEP Met, 10/26/2023  2) Pt will reduce edema by .5-1 cm in affected wrist/hand by 4 weeks.  3) Pt will reduce pain to less than 4/10 by 4 weeks. Met, " 10/26/2023  4) Pt will increase functional  strength by 5# in order to A in opening containers for med management or home management tasks by 4 weeks.   5) Patient will be able to achieve less than or equal to 60% on the FOTO, demonstrating overall improved functional ability with upper extremity. (Self-care category)     Long Term Goals:  Goals to be met by discharge:  1) Independent with HEP  2) Pt will decrease edema to trace or none to increase functional ROM by d/c.   3) Pt will decrease pain to trace or none while completing light home management tasks or work related tasks by d/c.   4) Patient will be able to achieve less than or equal to 45% on the FOTO, demonstrating overall improved functional ability with upper extremity.  (Self-care category)       Plan   Continue skilled OT POC and progress per protocol as tolerated.   Updates/Grading for next session: neuro kenyetta/sensory retraining, postural stability,  strengthening       Mary Hoover, OT

## 2023-10-26 ENCOUNTER — CLINICAL SUPPORT (OUTPATIENT)
Dept: REHABILITATION | Facility: HOSPITAL | Age: 53
End: 2023-10-26
Payer: MEDICAID

## 2023-10-26 DIAGNOSIS — M62.81 MUSCLE WEAKNESS: Primary | ICD-10-CM

## 2023-10-26 DIAGNOSIS — Z78.9 IMPAIRED MOBILITY AND ADLS: ICD-10-CM

## 2023-10-26 DIAGNOSIS — Z74.09 IMPAIRED MOBILITY AND ADLS: ICD-10-CM

## 2023-10-26 PROCEDURE — 97530 THERAPEUTIC ACTIVITIES: CPT | Mod: PN

## 2023-11-04 ENCOUNTER — HOSPITAL ENCOUNTER (EMERGENCY)
Facility: HOSPITAL | Age: 53
Discharge: HOME | End: 2023-11-04
Attending: EMERGENCY MEDICINE
Payer: MEDICAID

## 2023-11-04 VITALS
TEMPERATURE: 98 F | OXYGEN SATURATION: 96 % | RESPIRATION RATE: 16 BRPM | DIASTOLIC BLOOD PRESSURE: 84 MMHG | SYSTOLIC BLOOD PRESSURE: 138 MMHG | HEART RATE: 89 BPM

## 2023-11-04 DIAGNOSIS — R51.9 NONINTRACTABLE HEADACHE, UNSPECIFIED CHRONICITY PATTERN, UNSPECIFIED HEADACHE TYPE: ICD-10-CM

## 2023-11-04 DIAGNOSIS — R20.2 NUMBNESS AND TINGLING IN RIGHT HAND: ICD-10-CM

## 2023-11-04 DIAGNOSIS — R20.0 NUMBNESS AND TINGLING IN RIGHT HAND: ICD-10-CM

## 2023-11-04 DIAGNOSIS — J32.0 MAXILLARY SINUSITIS, UNSPECIFIED CHRONICITY: Primary | ICD-10-CM

## 2023-11-04 LAB
ALBUMIN SERPL BCP-MCNC: 3.4 G/DL (ref 3.5–5.2)
ALP SERPL-CCNC: 107 U/L (ref 55–135)
ALT SERPL W/O P-5'-P-CCNC: 22 U/L (ref 10–44)
ANION GAP SERPL CALC-SCNC: 13 MMOL/L (ref 8–16)
AST SERPL-CCNC: 16 U/L (ref 10–40)
BASOPHILS # BLD AUTO: 0.09 K/UL (ref 0–0.2)
BASOPHILS NFR BLD: 0.8 % (ref 0–1.9)
BILIRUB SERPL-MCNC: 0.3 MG/DL (ref 0.1–1)
BUN SERPL-MCNC: 10 MG/DL (ref 6–20)
CALCIUM SERPL-MCNC: 8.6 MG/DL (ref 8.7–10.5)
CHLORIDE SERPL-SCNC: 104 MMOL/L (ref 95–110)
CO2 SERPL-SCNC: 20 MMOL/L (ref 23–29)
CREAT SERPL-MCNC: 0.9 MG/DL (ref 0.5–1.4)
DIFFERENTIAL METHOD: ABNORMAL
EOSINOPHIL # BLD AUTO: 0.4 K/UL (ref 0–0.5)
EOSINOPHIL NFR BLD: 2.9 % (ref 0–8)
ERYTHROCYTE [DISTWIDTH] IN BLOOD BY AUTOMATED COUNT: 14.3 % (ref 11.5–14.5)
EST. GFR  (NO RACE VARIABLE): >60 ML/MIN/1.73 M^2
GLUCOSE SERPL-MCNC: 158 MG/DL (ref 70–110)
HCT VFR BLD AUTO: 50.1 % (ref 40–54)
HGB BLD-MCNC: 17 G/DL (ref 14–18)
IMM GRANULOCYTES # BLD AUTO: 0.05 K/UL (ref 0–0.04)
IMM GRANULOCYTES NFR BLD AUTO: 0.4 % (ref 0–0.5)
LYMPHOCYTES # BLD AUTO: 4.2 K/UL (ref 1–4.8)
LYMPHOCYTES NFR BLD: 35 % (ref 18–48)
MAGNESIUM SERPL-MCNC: 2 MG/DL (ref 1.6–2.6)
MCH RBC QN AUTO: 31.3 PG (ref 27–31)
MCHC RBC AUTO-ENTMCNC: 33.9 G/DL (ref 32–36)
MCV RBC AUTO: 92 FL (ref 82–98)
MONOCYTES # BLD AUTO: 0.9 K/UL (ref 0.3–1)
MONOCYTES NFR BLD: 7.4 % (ref 4–15)
NEUTROPHILS # BLD AUTO: 6.5 K/UL (ref 1.8–7.7)
NEUTROPHILS NFR BLD: 53.9 % (ref 38–73)
NRBC BLD-RTO: 0 /100 WBC
PLATELET # BLD AUTO: 281 K/UL (ref 150–450)
PMV BLD AUTO: 9.8 FL (ref 9.2–12.9)
POTASSIUM SERPL-SCNC: 3.9 MMOL/L (ref 3.5–5.1)
PROT SERPL-MCNC: 6.9 G/DL (ref 6–8.4)
RBC # BLD AUTO: 5.44 M/UL (ref 4.6–6.2)
SODIUM SERPL-SCNC: 137 MMOL/L (ref 136–145)
TROPONIN I SERPL DL<=0.01 NG/ML-MCNC: 0.01 NG/ML (ref 0–0.03)
WBC # BLD AUTO: 12 K/UL (ref 3.9–12.7)

## 2023-11-04 PROCEDURE — 93005 ELECTROCARDIOGRAM TRACING: CPT

## 2023-11-04 PROCEDURE — 93010 EKG 12-LEAD: ICD-10-PCS | Mod: ,,, | Performed by: INTERNAL MEDICINE

## 2023-11-04 PROCEDURE — 93010 ELECTROCARDIOGRAM REPORT: CPT | Mod: ,,, | Performed by: INTERNAL MEDICINE

## 2023-11-04 PROCEDURE — 96375 TX/PRO/DX INJ NEW DRUG ADDON: CPT

## 2023-11-04 PROCEDURE — 99285 EMERGENCY DEPT VISIT HI MDM: CPT | Mod: 25

## 2023-11-04 PROCEDURE — 84484 ASSAY OF TROPONIN QUANT: CPT | Performed by: EMERGENCY MEDICINE

## 2023-11-04 PROCEDURE — 63600175 PHARM REV CODE 636 W HCPCS: Performed by: EMERGENCY MEDICINE

## 2023-11-04 PROCEDURE — 85025 COMPLETE CBC W/AUTO DIFF WBC: CPT | Performed by: EMERGENCY MEDICINE

## 2023-11-04 PROCEDURE — 96374 THER/PROPH/DIAG INJ IV PUSH: CPT

## 2023-11-04 PROCEDURE — 80053 COMPREHEN METABOLIC PANEL: CPT | Performed by: EMERGENCY MEDICINE

## 2023-11-04 PROCEDURE — 83735 ASSAY OF MAGNESIUM: CPT | Performed by: EMERGENCY MEDICINE

## 2023-11-04 RX ORDER — PROCHLORPERAZINE EDISYLATE 5 MG/ML
10 INJECTION INTRAMUSCULAR; INTRAVENOUS ONCE
Status: COMPLETED | OUTPATIENT
Start: 2023-11-04 | End: 2023-11-04

## 2023-11-04 RX ORDER — KETOROLAC TROMETHAMINE 30 MG/ML
15 INJECTION, SOLUTION INTRAMUSCULAR; INTRAVENOUS
Status: COMPLETED | OUTPATIENT
Start: 2023-11-04 | End: 2023-11-04

## 2023-11-04 RX ORDER — BUTALBITAL, ACETAMINOPHEN AND CAFFEINE 50; 325; 40 MG/1; MG/1; MG/1
2 TABLET ORAL EVERY 6 HOURS PRN
Qty: 20 TABLET | Refills: 0 | Status: SHIPPED | OUTPATIENT
Start: 2023-11-04 | End: 2023-12-04

## 2023-11-04 RX ORDER — DOXYCYCLINE 100 MG/1
100 CAPSULE ORAL 2 TIMES DAILY
Qty: 20 CAPSULE | Refills: 0 | Status: SHIPPED | OUTPATIENT
Start: 2023-11-04 | End: 2023-11-14

## 2023-11-04 RX ORDER — DIPHENHYDRAMINE HYDROCHLORIDE 50 MG/ML
25 INJECTION INTRAMUSCULAR; INTRAVENOUS
Status: COMPLETED | OUTPATIENT
Start: 2023-11-04 | End: 2023-11-04

## 2023-11-04 RX ADMIN — KETOROLAC TROMETHAMINE 15 MG: 30 INJECTION, SOLUTION INTRAMUSCULAR; INTRAVENOUS at 08:11

## 2023-11-04 RX ADMIN — DIPHENHYDRAMINE HYDROCHLORIDE 25 MG: 50 INJECTION, SOLUTION INTRAMUSCULAR; INTRAVENOUS at 08:11

## 2023-11-04 RX ADMIN — PROCHLORPERAZINE EDISYLATE 10 MG: 5 INJECTION INTRAMUSCULAR; INTRAVENOUS at 08:11

## 2023-11-04 NOTE — ED NOTES
Pt now states had numbness to right hand for a month, states fell and hit elbow, already had discussion with PCP about complaint.

## 2023-11-04 NOTE — ED NOTES
Presents to ED for occipital/back of neck HA and numbness to right foot x 1wk, +numbness to RUE hand that presented this am. Pt states had surgical stents placed in artery for blood flow to BLE, pt states could not walk prior to surgery. Pt now ambulates with steady gait, drove self to ED.    Right dorsalis pulse could not be palpated by this RN however is normal (+2) with doppler.    Pt denies CP, SOB, dizziness.

## 2023-11-04 NOTE — ED NOTES
Pt not permitted to drive home at this time due to meds admin, pt does not have cell phone to call son and does not know phone number; MD agrees; pt informed will have to remain in ED.

## 2023-11-04 NOTE — ED PROVIDER NOTES
Encounter Date: 11/4/2023       History     Chief Complaint   Patient presents with    Dizziness     Pain to the back of the neck that radiates into the head X1 week. Woke up this morning feeling as tough he was going to pass out. Numbness to the botem of the right foot as well as numbness to the right side of the face. Right arm numbness.     Patient is a 53-year-old male who says he has had a posterior headache for the past 1 week.  He awoke this morning with generalized weakness and says he felt numbness to right side of his face below his right eye and to the palm of his right hand.  No extremity weakness.  No chest pain or shortness of breath.      Review of patient's allergies indicates:   Allergen Reactions    Pcn [penicillins] Anaphylaxis    Statins-hmg-coa reductase inhibitors Other (See Comments)     Myalgias     Past Medical History:   Diagnosis Date    Anticoagulant long-term use     Encounter for blood transfusion     Kidney stone      Past Surgical History:   Procedure Laterality Date    ANGIOGRAPHY OF LOWER EXTREMITY Bilateral 12/7/2020    Procedure: Angiogram Extremity Unilateral;  Surgeon: Terrance Garcia MD;  Location: [facility] OR [address] [place] [address];  Service: Vascular;  Laterality: Bilateral;  Iliac, PTA, and stenting.  7.4 min   1675.32 mGy  274.28 Gycm2    AORTOGRAPHY WITH SERIALOGRAPHY N/A 11/4/2020    Procedure: AORTOGRAM, WITH SERIALOGRAPHY;  Surgeon: Luis Rebollar III, MD;  Location: [facility] CATH LAB;  Service: Cardiology;  Laterality: N/A;    PERCUTANEOUS TRANSLUMINAL ANGIOPLASTY (PTA) OF PERIPHERAL VESSEL N/A 4/18/2022    Procedure: PTA, PERIPHERAL VESSEL;  Surgeon: Raymond Terry MD;  Location: [facility] CATH LAB/EP;  Service: Cardiology;  Laterality: N/A;     Family History   Problem Relation Age of Onset    Stroke Mother     Stroke Father     No Known Problems Sister      Social History     Tobacco Use    Smoking status: Former     Types: Cigarettes     Start date: 8/18/2023    Smokeless tobacco:  Never    Tobacco comments:     1 cigarette per day    Substance Use Topics    Alcohol use: Yes     Comment: occasional    Drug use: No     Review of Systems   Constitutional:  Negative for fever.   Respiratory:  Negative for shortness of breath.    Cardiovascular:  Negative for chest pain.   Neurological:  Positive for numbness and headaches.   All other systems reviewed and are negative.      Physical Exam     Initial Vitals [11/04/23 0752]   BP Pulse Resp Temp SpO2   (!) 184/92 106 20 97.8 °F (36.6 °C) 96 %      MAP       --         Physical Exam    Nursing note and vitals reviewed.  Constitutional: No distress.   HENT:   Head: Atraumatic.   Eyes: EOM are normal.   Neck: Neck supple.   Cardiovascular:  Normal rate, regular rhythm and normal heart sounds.           Pulmonary/Chest: Breath sounds normal.   Musculoskeletal:         General: Normal range of motion.      Cervical back: Neck supple.     Neurological: He is alert and oriented to person, place, and time. He has normal strength. No cranial nerve deficit or sensory deficit.   Skin: Skin is warm and dry.   Psychiatric: He has a normal mood and affect. Thought content normal.         ED Course   Procedures  Labs Reviewed   CBC W/ AUTO DIFFERENTIAL - Abnormal; Notable for the following components:       Result Value    MCH 31.3 (*)     Immature Grans (Abs) 0.05 (*)     All other components within normal limits   COMPREHENSIVE METABOLIC PANEL - Abnormal; Notable for the following components:    CO2 20 (*)     Glucose 158 (*)     Calcium 8.6 (*)     Albumin 3.4 (*)     All other components within normal limits   TROPONIN I   MAGNESIUM     EKG Readings: (Independently Interpreted)   Initial Reading: No STEMI. Rhythm: Normal Sinus Rhythm. Heart Rate: 94. ST Segments: Normal ST Segments. Other Findings: Prolonged QT Interval.     ECG Results              EKG 12-lead (In process)  Result time 11/04/23 10:39:27      In process by Interface, Lab In [facility] (11/04/23  10:39:27)                   Narrative:    Test Reason : R20.0,R20.2,    Vent. Rate : 094 BPM     Atrial Rate : 094 BPM     P-R Int : 146 ms          QRS Dur : 072 ms      QT Int : 384 ms       P-R-T Axes : 035 066 059 degrees     QTc Int : 480 ms    Normal sinus rhythm  Possible Left atrial enlargement  Prolonged QT  Abnormal ECG  When compared with ECG of 06-JUN-2023 15:14,  No significant change was found    Referred By: [email or web address]   SELF           Confirmed By:                                   Imaging Results              CT Head Without Contrast (Final result)  Result time 11/04/23 08:11:35      Final result by Rubina Rodriguez MD (11/04/23 08:11:35)                   Impression:      No evidence of acute intracranial pathology.    Significant inflammatory changes of the anterior ethmoid air cells and right maxillary sinus.      Electronically signed by: Rubina Rodriguez MD  Date:    11/04/2023  Time:    08:11               Narrative:    EXAMINATION:  CT HEAD WITHOUT CONTRAST    CLINICAL HISTORY:  Headache, new or worsening (Age >= 50y);    TECHNIQUE:  Low dose axial CT images obtained throughout the head without the use of intravenous contrast.  Axial, sagittal and coronal reconstructions were performed.    COMPARISON:  None.    FINDINGS:  Intracranial compartment:    Ventricles and sulci are normal in size for age without evidence of hydrocephalus.    The brain parenchyma appears within normal limits.  No parenchymal  hemorrhage, edema, mass effect or major vascular distribution infarct.    No extra-axial blood or fluid collections.    Skull/extracranial contents (limited evaluation):    No displaced calvarial fracture.    Significant mucosal thickening involving the anterior ethmoid air cells, circumferential mucosal thickening right maxillary sinus.  The sphenoid sinus, frontal sinuses and left maxillary sinuses are clear.  The mastoid air cells are clear.                                        Medications   ketorolac injection 15 mg (15 mg Intravenous Given 11/4/23 0836)   prochlorperazine injection Soln 10 mg (10 mg Intravenous Given 11/4/23 0836)   diphenhydrAMINE injection 25 mg (25 mg Intravenous Given 11/4/23 0836)     Medical Decision Making  DDx :  Including but not limited to,  TIA, CVA, hypoglycemia, dehydration, intracranial hemorrhage.    Emergent evaluation of a 53-year-old male who complains of a headache for the past 1 week.  He says he had fever couple of days ago but not today.  He also complains of generalized weakness.  Lab work is unremarkable.  Head CT shows no intracranial abnormalities other than signs of sinusitis.  Patient was given Toradol, Compazine and Benadryl with total relief of his headache.  In reviewing his chart I see that he is seen his primary physician last month for numbness to his right hand.  This seemed to be a chronic problem and I do not suspect a CVA or TIA.  I will treat his sinusitis with doxycycline and also write him a prescription for Fioricet if needed for headaches.  He should follow-up with his primary physician as soon as possible but may return to the emergency department for any possible worsening.    Amount and/or Complexity of Data Reviewed  Labs: ordered.     Details: CBC, CMP and troponin unremarkable.  Radiology: ordered.     Details: Head CT shows unremarkable brain.  There is inflammatory changes in the ethmoid air cells and right maxillary sinus, suggestive of sinusitis.    Risk  Prescription drug management.                               Clinical Impression:   Final diagnoses:  [R20.0, R20.2] Numbness and tingling in right hand  [J32.0] Maxillary sinusitis, unspecified chronicity (Primary)  [R51.9] Nonintractable headache, unspecified chronicity pattern, unspecified headache type        ED Disposition Condition    Discharge Stable          ED Prescriptions       Medication Sig Dispense Start Date End Date Auth. Provider    doxycycline  (VIBRAMYCIN) 100 MG Cap Take 1 capsule (100 mg total) by mouth 2 (two) times daily. for 10 days 20 capsule 11/4/2023 11/14/2023 Chuck Reynaga MD    butalbital-acetaminophen-caffeine -40 mg (FIORICET, ESGIC) -40 mg per tablet Take 2 tablets by mouth every 6 (six) hours as needed for Headaches. 20 tablet 11/4/2023 12/4/2023 Chuck Reynaga MD          Follow-up Information       Follow up With Specialties Details Why Contact Info    Luis Portillo MD Family Medicine Schedule an appointment as soon as possible for a visit   [address] [address] [phone]      [place] - Emergency Dept Emergency Medicine  If symptoms worsen [address]  [facility] [address] [phone]             Chuck Reynaga MD  11/04/23 [id number]

## 2023-11-04 NOTE — ED NOTES
"Pt easily aroused, pt ambulated to bathroom and around unit with steady gait, denies HA or dizziness, states "feel good." MD informed, pt permitted to leave ED.   "

## 2023-11-04 NOTE — ED NOTES
Pt assessed by Ronnell SALAZAR, states pt has no focal deficits however will still receive head CT.

## 2023-12-27 DIAGNOSIS — I73.9 PAD (PERIPHERAL ARTERY DISEASE): ICD-10-CM

## 2023-12-27 RX ORDER — CLOPIDOGREL BISULFATE 75 MG/1
75 TABLET ORAL DAILY
Qty: 90 TABLET | Refills: 3 | Status: SHIPPED | OUTPATIENT
Start: 2023-12-27 | End: 2024-12-26

## 2024-02-08 ENCOUNTER — TELEPHONE (OUTPATIENT)
Dept: VASCULAR SURGERY | Facility: CLINIC | Age: 54
End: 2024-02-08
Payer: MEDICAID

## 2024-02-08 ENCOUNTER — HOSPITAL ENCOUNTER (EMERGENCY)
Facility: HOSPITAL | Age: 54
Discharge: HOME | End: 2024-02-08
Attending: EMERGENCY MEDICINE
Payer: MEDICAID

## 2024-02-08 VITALS
BODY MASS INDEX: 27.09 KG/M2 | DIASTOLIC BLOOD PRESSURE: 97 MMHG | TEMPERATURE: 98 F | HEART RATE: 91 BPM | HEIGHT: 72 IN | RESPIRATION RATE: 18 BRPM | OXYGEN SATURATION: 96 % | SYSTOLIC BLOOD PRESSURE: 135 MMHG | WEIGHT: 200 LBS

## 2024-02-08 DIAGNOSIS — M25.551 BILATERAL HIP PAIN: ICD-10-CM

## 2024-02-08 DIAGNOSIS — M25.562 LEFT KNEE PAIN: ICD-10-CM

## 2024-02-08 DIAGNOSIS — M25.552 BILATERAL HIP PAIN: ICD-10-CM

## 2024-02-08 DIAGNOSIS — I73.9 CLAUDICATION: ICD-10-CM

## 2024-02-08 DIAGNOSIS — I74.09 ACUTE OCCLUSION OF AORTOILIAC ARTERY: Primary | ICD-10-CM

## 2024-02-08 DIAGNOSIS — I74.5 ILIAC ARTERY OCCLUSION, BILATERAL: ICD-10-CM

## 2024-02-08 LAB
ALBUMIN SERPL BCP-MCNC: 3.7 G/DL (ref 3.5–5.2)
ALP SERPL-CCNC: 135 U/L (ref 55–135)
ALT SERPL W/O P-5'-P-CCNC: 22 U/L (ref 10–44)
ANION GAP SERPL CALC-SCNC: 14 MMOL/L (ref 8–16)
APTT PPP: 30.4 SEC (ref 21–32)
AST SERPL-CCNC: 18 U/L (ref 10–40)
BASOPHILS # BLD AUTO: 0.07 K/UL (ref 0–0.2)
BASOPHILS NFR BLD: 0.7 % (ref 0–1.9)
BILIRUB SERPL-MCNC: 0.7 MG/DL (ref 0.1–1)
BUN SERPL-MCNC: 9 MG/DL (ref 6–20)
CALCIUM SERPL-MCNC: 8.8 MG/DL (ref 8.7–10.5)
CHLORIDE SERPL-SCNC: 102 MMOL/L (ref 95–110)
CO2 SERPL-SCNC: 23 MMOL/L (ref 23–29)
CREAT SERPL-MCNC: 0.9 MG/DL (ref 0.5–1.4)
DIFFERENTIAL METHOD BLD: ABNORMAL
EOSINOPHIL # BLD AUTO: 0.2 K/UL (ref 0–0.5)
EOSINOPHIL NFR BLD: 1.4 % (ref 0–8)
ERYTHROCYTE [DISTWIDTH] IN BLOOD BY AUTOMATED COUNT: 15.1 % (ref 11.5–14.5)
EST. GFR  (NO RACE VARIABLE): >60 ML/MIN/1.73 M^2
GLUCOSE SERPL-MCNC: 124 MG/DL (ref 70–110)
HCT VFR BLD AUTO: 51.6 % (ref 40–54)
HGB BLD-MCNC: 17.9 G/DL (ref 14–18)
IMM GRANULOCYTES # BLD AUTO: 0.03 K/UL (ref 0–0.04)
IMM GRANULOCYTES NFR BLD AUTO: 0.3 % (ref 0–0.5)
INR PPP: 1.1 (ref 0.8–1.2)
LYMPHOCYTES # BLD AUTO: 2.8 K/UL (ref 1–4.8)
LYMPHOCYTES NFR BLD: 27.1 % (ref 18–48)
MAGNESIUM SERPL-MCNC: 2.4 MG/DL (ref 1.6–2.6)
MCH RBC QN AUTO: 32.2 PG (ref 27–31)
MCHC RBC AUTO-ENTMCNC: 34.7 G/DL (ref 32–36)
MCV RBC AUTO: 93 FL (ref 82–98)
MONOCYTES # BLD AUTO: 0.7 K/UL (ref 0.3–1)
MONOCYTES NFR BLD: 7.1 % (ref 4–15)
NEUTROPHILS # BLD AUTO: 6.6 K/UL (ref 1.8–7.7)
NEUTROPHILS NFR BLD: 63.4 % (ref 38–73)
NRBC BLD-RTO: 0 /100 WBC
PLATELET # BLD AUTO: 266 K/UL (ref 150–450)
PMV BLD AUTO: 9.7 FL (ref 9.2–12.9)
POTASSIUM SERPL-SCNC: 4.1 MMOL/L (ref 3.5–5.1)
PROT SERPL-MCNC: 7.3 G/DL (ref 6–8.4)
PROTHROMBIN TIME: 11.4 SEC (ref 9–12.5)
RBC # BLD AUTO: 5.56 M/UL (ref 4.6–6.2)
SODIUM SERPL-SCNC: 139 MMOL/L (ref 136–145)
WBC # BLD AUTO: 10.37 K/UL (ref 3.9–12.7)

## 2024-02-08 PROCEDURE — 85730 THROMBOPLASTIN TIME PARTIAL: CPT | Performed by: PHYSICIAN ASSISTANT

## 2024-02-08 PROCEDURE — 25500020 PHARM REV CODE 255: Performed by: EMERGENCY MEDICINE

## 2024-02-08 PROCEDURE — 80053 COMPREHEN METABOLIC PANEL: CPT | Performed by: PHYSICIAN ASSISTANT

## 2024-02-08 PROCEDURE — 83735 ASSAY OF MAGNESIUM: CPT | Performed by: PHYSICIAN ASSISTANT

## 2024-02-08 PROCEDURE — 99285 EMERGENCY DEPT VISIT HI MDM: CPT | Mod: 25

## 2024-02-08 PROCEDURE — 85025 COMPLETE CBC W/AUTO DIFF WBC: CPT | Performed by: PHYSICIAN ASSISTANT

## 2024-02-08 PROCEDURE — 85610 PROTHROMBIN TIME: CPT | Performed by: PHYSICIAN ASSISTANT

## 2024-02-08 RX ADMIN — IOHEXOL 150 ML: 350 INJECTION, SOLUTION INTRAVENOUS at 10:02

## 2024-02-08 NOTE — TELEPHONE ENCOUNTER
----- Message from Tami Sauceda sent at 2/8/2024  4:23 PM CST -----  Regarding: URGENT  Contact: [phone]  Woody at [facility] is calling to speak to someone in the office asap. He spoke with Dr. Garcia and would like to speak with a Nurse

## 2024-02-08 NOTE — DISCHARGE INSTRUCTIONS
Continue taking Plavix as instructed.  Take ASA 81 mg daily.  Follow-up with Dr. Garcia on 2/12 abdomen 1:30 p.m.    Thank you for letting myself and our team take care for you today! It was nice meeting you, and I hope you feel better very soon. Please come back to [facility] for all of your future medical needs.   Our goal in the ER is to always give you outstanding care and exceptional service. You may receive a survey by mail or email in the next week about your experience in our ED. We would greatly appreciate you completing and returning the survey. Your feedback provides us with a way to recognize our staff who give very good care and it helps us learn how to improve when your experience was below our aspiration of excellence.     Sincerely,     Champ Montejo PA-C  Emergency Room Physician Assistant  [facility]

## 2024-02-08 NOTE — ED PROVIDER NOTES
"Encounter Date: 2/8/2024       History     Chief Complaint   Patient presents with    Hip Pain     C/o right hip pain and left knee pain x 4 days. States it is painful to walk.     53-year-old male, PMH PAD with aorto-iliac occlusion (Leriche's sydrome) status post bilateral stent iliac stent placement in 12/2020 with subsequent peripheral angiogram done in July 2021 which demonstrated bilateral iliac artery occlusions. Pt subsequently underwent successful aspiration thrombectomy with Dr. Asencio.  He presents today with concern of pain into bilateral hips and left knee that he states has progressively worsened over past few months.  He has noticed pain symptoms worsened with increase in activity but does improved with rest.  He describes pain extending into lower extremities as "burning" with occasional muscle cramping.  Denying numbness.  No chest pain, shortness of breath, abdominal pain, urinary or bowel complaints.  He states he was once on Xarelto but was told to discontinue and has since continued taking his Plavix as instructed.  No other acute complaints at this time.    The history is provided by the patient.     Review of patient's allergies indicates:   Allergen Reactions    Pcn [penicillins] Anaphylaxis    Statins-hmg-coa reductase inhibitors Other (See Comments)     Myalgias     Past Medical History:   Diagnosis Date    Anticoagulant long-term use     Encounter for blood transfusion     Kidney stone      Past Surgical History:   Procedure Laterality Date    ANGIOGRAPHY OF LOWER EXTREMITY Bilateral 12/7/2020    Procedure: Angiogram Extremity Unilateral;  Surgeon: Terrance Garcia MD;  Location: [facility] OR [address] [place] [address];  Service: Vascular;  Laterality: Bilateral;  Iliac, PTA, and stenting.  7.4 min   1675.32 mGy  274.28 Gycm2    AORTOGRAPHY WITH SERIALOGRAPHY N/A 11/4/2020    Procedure: AORTOGRAM, WITH SERIALOGRAPHY;  Surgeon: Luis Rebollar III, MD;  Location: [facility] CATH LAB;  Service: Cardiology;  " Laterality: N/A;    PERCUTANEOUS TRANSLUMINAL ANGIOPLASTY (PTA) OF PERIPHERAL VESSEL N/A 4/18/2022    Procedure: PTA, PERIPHERAL VESSEL;  Surgeon: Raymond Terry MD;  Location: [facility] CATH LAB/EP;  Service: Cardiology;  Laterality: N/A;     Family History   Problem Relation Age of Onset    Stroke Mother     Stroke Father     No Known Problems Sister      Social History     Tobacco Use    Smoking status: Former     Types: Cigarettes     Start date: 8/18/2023    Smokeless tobacco: Never    Tobacco comments:     1 cigarette per day    Substance Use Topics    Alcohol use: Yes     Comment: occasional    Drug use: No     Review of Systems   Constitutional:  Negative for chills and fever.   HENT:  Negative for sore throat.    Respiratory:  Negative for cough and shortness of breath.    Cardiovascular:  Negative for chest pain.   Gastrointestinal:  Negative for abdominal pain, diarrhea, nausea and vomiting.   Musculoskeletal:  Positive for arthralgias. Negative for back pain, neck pain and neck stiffness.   Neurological:  Negative for weakness and numbness.       Physical Exam     Initial Vitals [02/08/24 0633]   BP Pulse Resp Temp SpO2   (!) 162/90 102 18 97.8 °F (36.6 °C) 97 %      MAP       --         Physical Exam    Nursing note and vitals reviewed.  Constitutional: He appears well-developed and well-nourished. He is active. He does not have a sickly appearance. He does not appear ill. No distress.   HENT:   Head: Normocephalic and atraumatic.   Neck:   Normal range of motion.  Cardiovascular:  Normal rate.           Pulmonary/Chest: Effort normal.   Musculoskeletal:      Cervical back: Normal range of motion.      Comments: Bilateral lower extremities are warm.  Brisk capillary refill noticing bilateral feet.  Bilateral DP and PT pulses are noted with Doppler.  Denying any reproducible tenderness to bilateral hips or left knee.  Ambulatory in ED without assistance but with slower gait.  No skin wounds or ulcers      Neurological: He is alert. GCS eye subscore is 4. GCS verbal subscore is 5. GCS motor subscore is 6.   Skin: Skin is warm and dry.   Psychiatric: He has a normal mood and affect. His speech is normal and behavior is normal.         ED Course   Procedures  Labs Reviewed   CBC W/ AUTO DIFFERENTIAL - Abnormal; Notable for the following components:       Result Value    MCH 32.2 (*)     RDW 15.1 (*)     All other components within normal limits   COMPREHENSIVE METABOLIC PANEL - Abnormal; Notable for the following components:    Glucose 124 (*)     All other components within normal limits   APTT   PROTIME-INR   MAGNESIUM          Imaging Results              CTA Runoff ABD PEL Bilat Lower Ext (Final result)  Result time 02/08/24 12:08:51      Final result by Rubina Rodriguez MD (02/08/24 12:08:51)                   Impression:      Just proximal to the aorto iliac stents, there is a significant crescentic mural thrombus within the infrarenal abdominal aorta significantly reducing the caliber of the lumen, this is new from 09/03/2021.    Occluded bilateral aortoiliac stent.  The right external and internal iliac are now occluded which is new from the prior study.    Please see details of each lower extremity above.    Nonobstructive right renal stone.    Hepatic steatosis.      Electronically signed by: Rubina Rodriguez MD  Date:    02/08/2024  Time:    12:08               Narrative:    EXAMINATION:  CTA RUNOFF ABD PEL BILAT LOWER EXT    CLINICAL HISTORY:  Claudication or leg ischemia;    TECHNIQUE:  1.25 mm axial images of the abdominal aorta and lower extremities were obtained after the administration of 150 cc of Omni 350 IV contrast, coronal and sagittal images reformatted.    COMPARISON:  09/03/2021 CTA runoff    FINDINGS:  The celiac trunk, SMA, and bilateral renal arteries are patent, there is no atherosclerotic plaque.  The infrarenal abdominal aorta measures 1.9 x 1.7 cm, there is a significant  thrombosed crescentic mural thrombus of the infrarenal abdominal aorta decreasing the lumen to a diameter of 0.6 x 0.7 cm.  There is an aorta bi iliac stent.  The bilateral stent are occluded.  The JAVIER is patent.    Right lower extremity    Common iliac artery: Occluded    External iliac artery: Occluded    Internal iliac artery: Occluded    Common femoral artery: Patent    Superficial femoral artery: Patent, no atherosclerotic plaque    Deep femoral artery: Patent    Popliteal artery:Patent, no atherosclerotic plaque    Tibioperoneal trunk artery: Patent    Anterior tibial artery: Patent    Posterior tibial artery: Reconstituted distally    Peroneal artery: Occluded distally    DPA: Patent    Left lower extremity    Common iliac artery: Occluded    External iliac artery: Reconstituted    Internal iliac artery: Reconstituted    Common femoral artery: Patent    Superficial femoral artery: Patent    Deep femoral artery: Patent    Popliteal artery:Patent    Tibioperoneal trunk artery: Patent    Anterior tibial artery: Patent    Posterior tibial artery: Patent    Peroneal artery: Patent    DPA: Patent    The lung bases are clear.  4 mm pulmonary nodule pleural base, left lung base, unchanged from 09/03/2021.  No pleural effusion.  No pericardial effusion.  Decreased attenuation of the liver on noncontrast images (31 HU) consistent with hepatic steatosis.  No liver lesions.  The gallbladder is present, no radiopaque stones seen within.  The distal esophagus and stomach appear normal.  The pancreas, spleen and bilateral adrenal glands appear normal.  Nonobstructive right renal calculi.  No renal mass, no hydronephrosis, the bilateral ureters appear normal.  The bladder appears normal.  The prostate is not enlarged.  Mild stool retention, no inflammatory changes of the bowel seen, no bowel dilation, the appendix is normal.  The mesentery appears normal.  No adenopathy.  No free air, no free fluid.  The abdominal wall  demonstrate a small fat containing umbilical hernia, the inguinal regions appear within normal limits.  The osseous structures demonstrate no osseous lesions.  The soft tissues of the lower extremities appear within normal limits.  No advanced degenerative changes at the knee joint or hip joints.                                       X-Ray Knee 3 View Left (Final result)  Result time 02/08/24 09:55:48      Final result by Rubina Rodriguez MD (02/08/24 09:55:48)                   Impression:      No acute process seen.      Electronically signed by: Rubina Rodriguez MD  Date:    02/08/2024  Time:    09:55               Narrative:    EXAMINATION:  XR KNEE 3 VIEW LEFT    CLINICAL HISTORY:  Pain in left knee    TECHNIQUE:  AP, lateral, and Merchant views of the left knee were performed.    COMPARISON:  None    FINDINGS:  Normal tibiofemoral joint space and alignment.  Normal femoral patellar joint space.    No fracture, no osseous lesions.    No degenerative change.    No joint effusion.    The soft tissues appear normal.                                       X-Ray Hips Bilateral 2 View Incl AP Pelvis (Final result)  Result time 02/08/24 09:43:52      Final result by Rubina Rodriguez MD (02/08/24 09:43:52)                   Impression:      No acute process seen.      Electronically signed by: Rubina Rodriguez MD  Date:    02/08/2024  Time:    09:43               Narrative:    EXAMINATION:  XR HIPS BILATERAL 2 VIEW INCL AP PELVIS    CLINICAL HISTORY:  Pain in right hip    TECHNIQUE:  AP view of the pelvis and frogleg lateral views of both hips were performed.    COMPARISON:  None.    FINDINGS:  The bilateral hip joint space are preserved.  The bilateral femoral head contour are normal.  Small osseous spur arising from the right acetabular margin.  No fracture, no osseous lesions.    The bilateral sacroiliac joints appear symmetrical.    Aorto bi iliac vascular stent seen.    The remainder of the  visualized osseous structures and soft tissues appear normal.                                       Medications   iohexoL (OMNIPAQUE 350) injection 150 mL (150 mLs Intravenous Given 2/8/24 1038)     Medical Decision Making  Patient presents with concern of gradual worsening pain throughout bilateral hips and left knee.  Afebrile with vitals grossly unremarkable, noting mildly hypertensive.  Denying any reproducible tenderness to lower extremities.  Bilateral feet are warm with pulses intact.    DDx:  Including but not limited to PAD, claudication, vessel occlusion, radiculopathy, neuropathy, musculoskeletal, electrolyte abnormality, dehydration, arthritis    Amount and/or Complexity of Data Reviewed  Labs: ordered. Decision-making details documented in ED Course.  Radiology: ordered. Decision-making details documented in ED Course.    Risk  Prescription drug management.               ED Course as of 02/08/24 1646   Thu Feb 08, 2024   0941 CBC auto differential(!) [KS]   1055 CBC auto differential(!)  Unremarkable [KS]   1055 Comprehensive metabolic panel(!)  Unremarkable [KS]   1055 Magnesium  WNL [KS]   1055 APTT  WNL [KS]   1055 Protime-INR  WNL [KS]   1226 CTA Runoff ABD PEL Bilat Lower Ext  Per radiology review:  Just proximal to the aorto iliac stents, there is a significant crescentic mural thrombus within the infrarenal abdominal aorta significantly reducing the caliber of the lumen, this is new from 09/03/2021.     Occluded bilateral aortoiliac stent.  The right external and internal iliac are now occluded which is new from the prior study.  I did contact Cardiology on-call, Dr. Bolden, pending return call   [KS]   1441 Patient discussed with Cardiology, Dr. Bolden, who is requesting patient to be transferred to [facility] for vascular surgery [KS]   1620 Patient discussed with vascular surgeon, Dr. Garcia.  He states no emergent intervention is necessary at this time and he was requesting patient to follow  up in his clinic on Monday. Phone number was provided to assist in scheduling appt. He states patient can continue taking his plavix as instructed in addition to ASA 81mg. This was discussed with patient.  [KS]   1644 Appointment scheduled 2/12 at 1:30 p.m..  This was discussed with patient.  Encouraged to monitor symptoms very closely with high ED return precautions.  He states his understanding and agrees with plan. [KS]   1644 Patient discussed with attending, Dr. Reynaga, who agrees with ED course and dispo. [KS]      ED Course User Index  [KS] Champ Montejo PA-C                           Clinical Impression:  Final diagnoses:  [M25.551, M25.552] Bilateral hip pain  [M25.562] Left knee pain  [I73.9] Claudication  [I74.5] Iliac artery occlusion, bilateral  [I74.09] Acute occlusion of aortoiliac artery (Primary)          ED Disposition Condition    Discharge Stable          ED Prescriptions    None       Follow-up Information       Follow up With Specialties Details Why Contact Info    Terrance Garcia MD Vascular Surgery Go on 2/12/2024 Appt at 1:30pm.  Office location is 5th floor in vascularly surgery department. (take gold elevators) [address] [facility] [address]  [phone]               Champ Montejo PA-C  02/08/24 [id number]

## 2024-02-12 ENCOUNTER — INITIAL CONSULT (OUTPATIENT)
Dept: VASCULAR SURGERY | Facility: CLINIC | Age: 54
End: 2024-02-12
Payer: MEDICAID

## 2024-02-12 VITALS
DIASTOLIC BLOOD PRESSURE: 96 MMHG | WEIGHT: 207.25 LBS | HEIGHT: 72 IN | SYSTOLIC BLOOD PRESSURE: 157 MMHG | TEMPERATURE: 98 F | HEART RATE: 101 BPM | BODY MASS INDEX: 28.07 KG/M2

## 2024-02-12 DIAGNOSIS — I73.9 PAD (PERIPHERAL ARTERY DISEASE): Primary | ICD-10-CM

## 2024-02-12 PROCEDURE — 99213 OFFICE O/P EST LOW 20 MIN: CPT | Mod: PBBFAC | Performed by: SURGERY

## 2024-02-12 PROCEDURE — 99999 PR PBB SHADOW E&M-EST. PATIENT-LVL III: CPT | Mod: PBBFAC,,, | Performed by: SURGERY

## 2024-02-12 PROCEDURE — 99214 OFFICE O/P EST MOD 30 MIN: CPT | Mod: S$PBB,,, | Performed by: SURGERY

## 2024-02-12 RX ORDER — MELOXICAM 15 MG/1
15 TABLET ORAL DAILY
Qty: 30 TABLET | Refills: 2 | Status: SHIPPED | OUTPATIENT
Start: 2024-02-12

## 2024-02-12 NOTE — PROGRESS NOTES
Patient is a 56-year-old male who was referred in from the ED peripheral vascular disease.  Had multiple stents placed in his iliacs and once seems to be occluded.  Has a very small aorta with a lot of thrombus.  However when questioning him his main complaint is that his left knee is bothering him.  He also states that his right foot is numb but it has been numb for a while and it only happens in the bottom of foot does not bother him much.  Denies claudication but says he walks slowly but he has no claudication at all.  Patient has a history of chronic depression he has not taking anything at this point.  He is tried numerous medications in the past, says it has not really helping him much.    His past medical history is significant for hypercholesterolemia for diabetes, for hypertension and peripheral vascular disease     Medications were reviewed he is on clopidogrel Zetia folic acid Neurontin HCTZ metformin and he takes Xarelto.  He has not certain why he is taking the Xarelto.    He says he is started smoking again when he went to the ER.  He had knee pain.  And hip pain.  At this point he had a CTA which I will review with the patient please see the formal report.  Both iliac stents are occluded.    Examined the patient he is got biphasic Doppler flow to his feet no femoral pulses are obvious.    The fact that he can walk as far as he wants without difficulty and that has not an issue.  We will DC his robotic band as he does not need it anymore we will change his meloxicam from 7.515 mg once a day.  I will have him return to clinic p.r.n..  If his pain continues his knee his general internist should have him see an orthopedic surgeon.  Significant time was spent reviewing the CT scan personally with the patient thank you

## 2024-02-14 ENCOUNTER — TELEPHONE (OUTPATIENT)
Dept: VASCULAR SURGERY | Facility: CLINIC | Age: 54
End: 2024-02-14
Payer: MEDICAID

## 2024-02-14 NOTE — TELEPHONE ENCOUNTER
"Contacted pt in response to message. Pt states he is questioning visit note with Dr. Garcia which states "we will DC his robotic band" and is questioning what this means. After reviewing pt's chart nurse notified pt that this is likely as typo as Dr. Garcia's notes are often dictated with a microphone and that nurse believes this was meant to say "will DC his meloxicam." Pt verbalized understanding and stated this made more sense to him.----- Message from Asiya Hummel sent at 2/14/2024  1:55 PM CST -----  Pt Requesting Call Back    Who called: pt  Who called for pt:  Best call back #: [phone]  Add notes: pt said he have questions about the notes the doctor wrote regarding him on the [facility] (2/12/24 visit)    "

## 2024-02-28 ENCOUNTER — PATIENT MESSAGE (OUTPATIENT)
Dept: VASCULAR SURGERY | Facility: CLINIC | Age: 54
End: 2024-02-28
Payer: MEDICAID

## 2024-03-01 ENCOUNTER — PATIENT MESSAGE (OUTPATIENT)
Dept: VASCULAR SURGERY | Facility: CLINIC | Age: 54
End: 2024-03-01
Payer: MEDICAID

## 2024-03-01 DIAGNOSIS — I73.9 PAD (PERIPHERAL ARTERY DISEASE): Primary | ICD-10-CM

## 2024-03-04 NOTE — TELEPHONE ENCOUNTER
Contacted pt in response to message. Explained that next available appt with Dr. Garcia in [place] is not until 4/3/24. Pt verbalized understanding and states he will keep appt at [facility] on 3/11/24.

## 2024-03-11 ENCOUNTER — TELEPHONE (OUTPATIENT)
Dept: VASCULAR SURGERY | Facility: CLINIC | Age: 54
End: 2024-03-11

## 2024-03-11 ENCOUNTER — DOCUMENTATION ONLY (OUTPATIENT)
Dept: VASCULAR SURGERY | Facility: CLINIC | Age: 54
End: 2024-03-11

## 2024-03-11 ENCOUNTER — OFFICE VISIT (OUTPATIENT)
Dept: VASCULAR SURGERY | Facility: CLINIC | Age: 54
End: 2024-03-11
Payer: MEDICAID

## 2024-03-11 VITALS
TEMPERATURE: 98 F | HEART RATE: 65 BPM | BODY MASS INDEX: 27.17 KG/M2 | SYSTOLIC BLOOD PRESSURE: 176 MMHG | WEIGHT: 200.63 LBS | HEIGHT: 72 IN | DIASTOLIC BLOOD PRESSURE: 97 MMHG

## 2024-03-11 DIAGNOSIS — I70.203 ATHEROSCLEROSIS OF ARTERY OF BOTH LOWER EXTREMITIES: Primary | ICD-10-CM

## 2024-03-11 DIAGNOSIS — I73.9 CLAUDICATION: ICD-10-CM

## 2024-03-11 DIAGNOSIS — Z01.818 PRE-OPERATIVE CLEARANCE: ICD-10-CM

## 2024-03-11 DIAGNOSIS — I70.229 CRITICAL LOWER LIMB ISCHEMIA: Primary | ICD-10-CM

## 2024-03-11 PROCEDURE — 3080F DIAST BP >= 90 MM HG: CPT | Mod: CPTII,,, | Performed by: SURGERY

## 2024-03-11 PROCEDURE — 99214 OFFICE O/P EST MOD 30 MIN: CPT | Mod: S$PBB,,, | Performed by: SURGERY

## 2024-03-11 PROCEDURE — 3008F BODY MASS INDEX DOCD: CPT | Mod: CPTII,,, | Performed by: SURGERY

## 2024-03-11 PROCEDURE — 99213 OFFICE O/P EST LOW 20 MIN: CPT | Mod: PBBFAC | Performed by: SURGERY

## 2024-03-11 PROCEDURE — 3077F SYST BP >= 140 MM HG: CPT | Mod: CPTII,,, | Performed by: SURGERY

## 2024-03-11 PROCEDURE — 99999 PR PBB SHADOW E&M-EST. PATIENT-LVL III: CPT | Mod: PBBFAC,,, | Performed by: SURGERY

## 2024-03-11 PROCEDURE — 1159F MED LIST DOCD IN RCRD: CPT | Mod: CPTII,,, | Performed by: SURGERY

## 2024-03-11 NOTE — PROGRESS NOTES
I was present the whole time along with Dr. Terrance Garcia when he explained in detail to the pt about the surgery. I have reviewed his clinic note and everything that was mention was indeed what was discussed with the pt.

## 2024-03-11 NOTE — PROGRESS NOTES
Patient returns.  His claudication has a worse.  The meloxicam did not help him the least bit.  Reviewing his CT scan he has totally occluded iliac stents and a totally occluded total iliac system on the right side.  The left side has some flow in the external iliac artery.  The patient truly needs an aortobifemoral bypass.  However the problem is that he can not afford to miss work.  He can not afford to be out of work for this procedure.  I have discussed with him that I do not know whether stenting redo stenting would work if we can even get it open.  I think we can open up the right side but I am not sure I excuse me the left side but I am not sure about the right side.  Again I do not think this is the best medically, but however if he misses work he said he will not have enough money to eat or live.  So we will try to go up the left side from the groin and possibly re-enter.  Then we will go up the right through a left arm cutdown.  If we can get stuff open great.  I would not do covered stents because there is a large collateral coming off the distal aorta that looks like it feeds a lot of the legs.  I would do bare metal unless we absolutely have to do covered stents in the aorta.  In the iliacs I can do covered stents.    He understands that this may not work.  He understands that this is trying to temporize him until he has enough time and money that he can not go on.  Significant time was spent reviewing this with him going over the CT scan explained the risks benefits.  We will schedule a procedures in the future

## 2024-04-01 ENCOUNTER — TELEPHONE (OUTPATIENT)
Dept: VASCULAR SURGERY | Facility: CLINIC | Age: 54
End: 2024-04-01
Payer: MEDICAID

## 2024-04-01 NOTE — TELEPHONE ENCOUNTER
Attempted to contact pt with time of arrival for procedure tomorrow with Dr. Garcia. Voice message left for pt with time of arrival and requesting return call to confirm.

## 2024-04-01 NOTE — TELEPHONE ENCOUNTER
Attempted to contact pt with time of arrival for procedure tomorrow with Dr. Garcia. Voice message left for pt with time of arrival and requesting return call to confirm time of arrival has been received. Also sent message to patient via My Chart with time of arrival and notified Dr. Garcia that nurse has not been able to reach patient. No other contact numbers on file for patient.

## 2024-04-01 NOTE — TELEPHONE ENCOUNTER
Attempted to contact pt with time of arrival for procedure tomorrow with Dr. Garcia. Voice message left for pt with arrival time and requesting return call to confirm time of arrival has been received.

## 2025-02-24 NOTE — Clinical Note
The wire was removed from the distal infrarenal abdominal aorta.  [FreeTextEntry1] : I had a detailed discussion with the patient regarding optimization of bowel movements with increasing daily fiber and water intake. Both synthetic and dietary fiber recommendations were reviewed. I had strongly counseled the patient regarding the need for increasing water to help improve  bowel function.  I discussed with the patient that improving  bowel function will alleviate  hemorrhoidal symptoms.  Advised return in 4-6 weeks if symptoms are persistent.  If symptoms are persistent we will plan for rubber band ligation.  Risk, benefits alternatives discussed.  All questions answered.

## 2025-03-25 NOTE — BRIEF OP NOTE
Post Cath Note  Preoperative Diagnosis: PAD (peripheral artery disease) [I73.9]Critical lower limb ischemia [I70.229]Leriche syndrome [I74.09]   Postop Diagnosis: PAD (peripheral artery disease) [I73.9]Critical lower limb ischemia [I70.229]Leriche syndrome [I74.09]  Referring Physician: Raymond Terry     Procedure: PTA, PERIPHERAL VESSEL (N/A), Angiogram, Abdominal Aorta W/ Extremity Runoff, PTA, Iliac Artery, ULTRASOUND, INTRAVASCULAR (N/A)       Access: Right CFA, Left CFA and Right radial  : Raymond Terry MD   All Operators: Surgeon(s):  Raymond Terry MD       Intervention:     - S/P B/L Iliac PTA with 7.0 x80 kissing balloons   - IVUS. Pigtail with B/L LE post intervention runoff    See full report for further details    Treatments/Procedures: Procedure(s) (LRB):  PTA, PERIPHERAL VESSEL (N/A)  Angiogram, Abdominal Aorta W/ Extremity Runoff  PTA, Iliac Artery  ULTRASOUND, INTRAVASCULAR (N/A)     -Closure device: Perclosure and radial band    Findings:Severe peripheral disease is present. See catheterization report for full details.    Estimated Blood loss: 20 cc    Specimens removed: No  Post Cath Exam:     Vitals:    04/18/22 0946   BP: 129/81   Pulse:    Resp:    Temp:      No unusual pain, hematoma, thrill or bruit at vascular access site.  Distal pulse present without signs of ischemia.    Recommendations:   - Patient tolerated procedure well. No immediate complications  - Routine post-cath care  - Continue plavix and Xarelto   - BMP today  - Routine post cath protocol  - Maximize medical management  - IVF at 3 ml/kg/hr for 3 hours  - Smoking cessation counseling, Continue medical management, Risk factor reduction, Follow-up with outpatient cardiologist    Luis uLcia - Pager# [phone]  4/18/2022  1:16 PM  Cardiovascular Fellow  [facility]     
Initiate Treatment: Minoxidil 1.25mg daily
Render In Strict Bullet Format?: No
Plan: Increase minoxidil if no side effects at follow up\\nConsider finasteride
Detail Level: Zone

## (undated) DEVICE — SEE MEDLINE ITEM 157187

## (undated) DEVICE — PAD DEFIB CADENCE ADULT R2

## (undated) DEVICE — GUIDEWIRE STF .035X260CM ANG

## (undated) DEVICE — KIT INDIGO CATH ASP 6FR 135CM

## (undated) DEVICE — WIRE ROSEN .035X260

## (undated) DEVICE — CATH NAVICROSS .035X135CM STR

## (undated) DEVICE — KIT LEFT HEART MANIFOLD CUSTOM

## (undated) DEVICE — CATH IMPULSE 5FR PIGTAIL 125CM

## (undated) DEVICE — SHEATH 7FR 25CM

## (undated) DEVICE — WIRE MANDRIL 98/60CM

## (undated) DEVICE — COVER PROBE US 5.5X58L NON LTX

## (undated) DEVICE — BANDAGE D-STAT DRY HEMOSTATIC

## (undated) DEVICE — CATH EAGLE EYE ST .014X20X150

## (undated) DEVICE — SHEATH INTRODUCER 6FR 11CM

## (undated) DEVICE — COVERS PROBE NR-48 STERILE

## (undated) DEVICE — GLIDESHEATH SLENDER SS 5FR10CM

## (undated) DEVICE — CATH SOFT VU BRAIDED 4F X 80CM

## (undated) DEVICE — INTRODUCER R/O II 7FX10 W/.038

## (undated) DEVICE — GUIDEWIRE FIELDER XT.014X300CM

## (undated) DEVICE — Device

## (undated) DEVICE — SOL 9P NACL IRR PIC IL

## (undated) DEVICE — GUIDEWIRE STF .035X180CM ANG

## (undated) DEVICE — VISIPAQUE 320 200ML +PK

## (undated) DEVICE — INFLATOR ENCORE 26 BLLN INFL

## (undated) DEVICE — SEPERATOR INDIGO CATH 12

## (undated) DEVICE — CATH NAVICROSS .035X135 ANGLE

## (undated) DEVICE — CATH GLIDECATH PV MLTCRV 4FR

## (undated) DEVICE — COVER BAND BAG 40 X 40

## (undated) DEVICE — HEMOSTAT VASC BAND REG 24CM

## (undated) DEVICE — SYS LABEL CORRECT MED

## (undated) DEVICE — CONTRAST VISIPAQUE 150ML

## (undated) DEVICE — SPONGE DERMACEA 4X4IN 12PLY

## (undated) DEVICE — GUIDEWIRE ADVNTG 035X260CM ANG

## (undated) DEVICE — GUIDEWIRE STEELCORE LT 190CM

## (undated) DEVICE — CATH 5FR OMNIFLUSH 65CM .038

## (undated) DEVICE — SET MICRO PUNCT 4FR/MPIS-401

## (undated) DEVICE — SET DECANTER MEDICHOICE

## (undated) DEVICE — KIT INTRO MICRO NIT VSI 4FR

## (undated) DEVICE — VISE RADIFOCUS MULTI TORQUE

## (undated) DEVICE — CATH ULTRAVERSE 035 6X80X75

## (undated) DEVICE — GUIDEWIRE EMERALD 150CM PTFE

## (undated) DEVICE — PADS RADI PERIPHERAL SHIELD

## (undated) DEVICE — INFLATOR ENCORE

## (undated) DEVICE — GUIDEWIRE AMPLATZ 180 46-525

## (undated) DEVICE — CATH CXI STR 2.6F .018IN 90CM

## (undated) DEVICE — TUBING CNTRST INJ ADPT 72IN

## (undated) DEVICE — DEVICE PERCLOSE SUT CLSR 6FR

## (undated) DEVICE — CATH ULTRAVERSE 035 7X80X75

## (undated) DEVICE — DRESSING TRANS 4X4 TEGADERM

## (undated) DEVICE — KIT INTRODUCER W/GUIDEWIRE

## (undated) DEVICE — INTRODUCER VASC RADPQ 5FRX10CM

## (undated) DEVICE — CATH ULTRAVERSE 035 7X120X75

## (undated) DEVICE — SET MICROPUNCTURE

## (undated) DEVICE — SOL NS 1000CC

## (undated) DEVICE — DEVICE MYNX GRIP 6/7F

## (undated) DEVICE — SYR MARK 7 ARTERION 150ML

## (undated) DEVICE — CANISTER INDIGO ENGINE

## (undated) DEVICE — COVER INSTR ELASTIC BAND 40X20